# Patient Record
Sex: MALE | Race: WHITE | NOT HISPANIC OR LATINO | Employment: OTHER | URBAN - METROPOLITAN AREA
[De-identification: names, ages, dates, MRNs, and addresses within clinical notes are randomized per-mention and may not be internally consistent; named-entity substitution may affect disease eponyms.]

---

## 2017-01-17 ENCOUNTER — GENERIC CONVERSION - ENCOUNTER (OUTPATIENT)
Dept: OTHER | Facility: OTHER | Age: 82
End: 2017-01-17

## 2017-01-31 ENCOUNTER — HOSPITAL ENCOUNTER (INPATIENT)
Facility: HOSPITAL | Age: 82
LOS: 14 days | Discharge: NURSING, REHAB OR LONG-TERM CARE | DRG: 177 | End: 2017-02-14
Attending: INTERNAL MEDICINE | Admitting: INTERNAL MEDICINE
Payer: MEDICARE

## 2017-01-31 ENCOUNTER — APPOINTMENT (EMERGENCY)
Dept: RADIOLOGY | Facility: HOSPITAL | Age: 82
DRG: 177 | End: 2017-01-31
Payer: MEDICARE

## 2017-01-31 DIAGNOSIS — J18.9 HEALTHCARE-ASSOCIATED PNEUMONIA: Primary | ICD-10-CM

## 2017-01-31 PROBLEM — R09.02 HYPOXIA: Status: ACTIVE | Noted: 2017-01-31

## 2017-01-31 PROBLEM — R53.1 GENERALIZED WEAKNESS: Status: ACTIVE | Noted: 2017-01-31

## 2017-01-31 LAB
ALBUMIN SERPL BCP-MCNC: 3.4 G/DL (ref 3.5–5)
ALP SERPL-CCNC: 87 U/L (ref 46–116)
ALT SERPL W P-5'-P-CCNC: 45 U/L (ref 12–78)
ANION GAP SERPL CALCULATED.3IONS-SCNC: 11 MMOL/L (ref 4–13)
APTT PPP: 28 SECONDS (ref 24–36)
AST SERPL W P-5'-P-CCNC: 68 U/L (ref 5–45)
ATRIAL RATE: 80 BPM
BASOPHILS # BLD AUTO: 0.02 THOUSANDS/ΜL (ref 0–0.1)
BASOPHILS NFR BLD AUTO: 1 % (ref 0–1)
BILIRUB DIRECT SERPL-MCNC: 0.18 MG/DL (ref 0–0.2)
BILIRUB SERPL-MCNC: 0.5 MG/DL (ref 0.2–1)
BUN SERPL-MCNC: 20 MG/DL (ref 5–25)
CALCIUM SERPL-MCNC: 9.2 MG/DL (ref 8.3–10.1)
CHLORIDE SERPL-SCNC: 101 MMOL/L (ref 100–108)
CLARITY, POC: CLEAR
CO2 SERPL-SCNC: 28 MMOL/L (ref 21–32)
COLOR, POC: YELLOW
CREAT SERPL-MCNC: 1.14 MG/DL (ref 0.6–1.3)
EOSINOPHIL # BLD AUTO: 0.04 THOUSAND/ΜL (ref 0–0.61)
EOSINOPHIL NFR BLD AUTO: 1 % (ref 0–6)
ERYTHROCYTE [DISTWIDTH] IN BLOOD BY AUTOMATED COUNT: 12.9 % (ref 11.6–15.1)
EXT BILIRUBIN, UA: NEGATIVE
EXT BLOOD URINE: NEGATIVE
EXT GLUCOSE, UA: NEGATIVE
EXT KETONES: NEGATIVE
EXT NITRITE, UA: NEGATIVE
EXT PH, UA: 5
EXT PROTEIN, UA: NORMAL
EXT SPECIFIC GRAVITY, UA: 1.02
EXT UROBILINOGEN: 0.2
FLUAV AG SPEC QL IA: NEGATIVE
FLUBV AG SPEC QL IA: NEGATIVE
GFR SERPL CREATININE-BSD FRML MDRD: 59.9 ML/MIN/1.73SQ M
GLUCOSE SERPL-MCNC: 148 MG/DL (ref 65–140)
HCT VFR BLD AUTO: 38.2 % (ref 36.5–49.3)
HGB BLD-MCNC: 12.7 G/DL (ref 12–17)
INR PPP: 1.04 (ref 0.86–1.16)
L PNEUMO1 AG UR QL IA.RAPID: NEGATIVE
LACTATE SERPL-SCNC: 1.3 MMOL/L (ref 0.5–2)
LYMPHOCYTES # BLD AUTO: 0.88 THOUSANDS/ΜL (ref 0.6–4.47)
LYMPHOCYTES NFR BLD AUTO: 22 % (ref 14–44)
MAGNESIUM SERPL-MCNC: 2 MG/DL (ref 1.6–2.6)
MCH RBC QN AUTO: 32.2 PG (ref 26.8–34.3)
MCHC RBC AUTO-ENTMCNC: 33.2 G/DL (ref 31.4–37.4)
MCV RBC AUTO: 97 FL (ref 82–98)
MONOCYTES # BLD AUTO: 0.24 THOUSAND/ΜL (ref 0.17–1.22)
MONOCYTES NFR BLD AUTO: 6 % (ref 4–12)
NEUTROPHILS # BLD AUTO: 2.73 THOUSANDS/ΜL (ref 1.85–7.62)
NEUTS SEG NFR BLD AUTO: 70 % (ref 43–75)
NRBC BLD AUTO-RTO: 0 /100 WBCS
P AXIS: 7 DEGREES
PLATELET # BLD AUTO: 130 THOUSANDS/UL (ref 149–390)
PMV BLD AUTO: 9.8 FL (ref 8.9–12.7)
POTASSIUM SERPL-SCNC: 4 MMOL/L (ref 3.5–5.3)
PR INTERVAL: 218 MS
PROT SERPL-MCNC: 6.6 G/DL (ref 6.4–8.2)
PROTHROMBIN TIME: 13.5 SECONDS (ref 12–14.3)
QRS AXIS: 18 DEGREES
QRSD INTERVAL: 76 MS
QT INTERVAL: 354 MS
QTC INTERVAL: 408 MS
RBC # BLD AUTO: 3.95 MILLION/UL (ref 3.88–5.62)
S PNEUM AG UR QL: NEGATIVE
SODIUM SERPL-SCNC: 140 MMOL/L (ref 136–145)
SPECIMEN SOURCE: NORMAL
T WAVE AXIS: 13 DEGREES
TROPONIN I BLD-MCNC: 0.01 NG/ML (ref 0–0.08)
TSH SERPL DL<=0.05 MIU/L-ACNC: 0.89 UIU/ML (ref 0.36–3.74)
VENTRICULAR RATE: 80 BPM
WBC # BLD AUTO: 3.93 THOUSAND/UL (ref 4.31–10.16)
WBC # BLD EST: NEGATIVE 10*3/UL

## 2017-01-31 PROCEDURE — 71010 HB CHEST X-RAY 1 VIEW FRONTAL (PORTABLE): CPT

## 2017-01-31 PROCEDURE — 83735 ASSAY OF MAGNESIUM: CPT | Performed by: PHYSICIAN ASSISTANT

## 2017-01-31 PROCEDURE — 85610 PROTHROMBIN TIME: CPT | Performed by: PHYSICIAN ASSISTANT

## 2017-01-31 PROCEDURE — 85025 COMPLETE CBC W/AUTO DIFF WBC: CPT | Performed by: PHYSICIAN ASSISTANT

## 2017-01-31 PROCEDURE — 36415 COLL VENOUS BLD VENIPUNCTURE: CPT | Performed by: PHYSICIAN ASSISTANT

## 2017-01-31 PROCEDURE — 80076 HEPATIC FUNCTION PANEL: CPT | Performed by: PHYSICIAN ASSISTANT

## 2017-01-31 PROCEDURE — 80048 BASIC METABOLIC PNL TOTAL CA: CPT | Performed by: PHYSICIAN ASSISTANT

## 2017-01-31 PROCEDURE — 84484 ASSAY OF TROPONIN QUANT: CPT

## 2017-01-31 PROCEDURE — 87798 DETECT AGENT NOS DNA AMP: CPT | Performed by: PHYSICIAN ASSISTANT

## 2017-01-31 PROCEDURE — 87449 NOS EACH ORGANISM AG IA: CPT | Performed by: FAMILY MEDICINE

## 2017-01-31 PROCEDURE — 93005 ELECTROCARDIOGRAM TRACING: CPT | Performed by: PHYSICIAN ASSISTANT

## 2017-01-31 PROCEDURE — 83605 ASSAY OF LACTIC ACID: CPT | Performed by: PHYSICIAN ASSISTANT

## 2017-01-31 PROCEDURE — 84443 ASSAY THYROID STIM HORMONE: CPT | Performed by: PHYSICIAN ASSISTANT

## 2017-01-31 PROCEDURE — 87400 INFLUENZA A/B EACH AG IA: CPT | Performed by: PHYSICIAN ASSISTANT

## 2017-01-31 PROCEDURE — 81002 URINALYSIS NONAUTO W/O SCOPE: CPT | Performed by: PHYSICIAN ASSISTANT

## 2017-01-31 PROCEDURE — 85730 THROMBOPLASTIN TIME PARTIAL: CPT | Performed by: PHYSICIAN ASSISTANT

## 2017-01-31 RX ORDER — POTASSIUM CHLORIDE 750 MG/1
10 TABLET, FILM COATED, EXTENDED RELEASE ORAL 2 TIMES DAILY
COMMUNITY
End: 2017-02-14 | Stop reason: HOSPADM

## 2017-01-31 RX ORDER — LOPERAMIDE HYDROCHLORIDE 2 MG/1
2 CAPSULE ORAL AS NEEDED
COMMUNITY
End: 2017-02-14 | Stop reason: HOSPADM

## 2017-01-31 RX ORDER — VANCOMYCIN HYDROCHLORIDE 1 G/200ML
12.5 INJECTION, SOLUTION INTRAVENOUS EVERY 24 HOURS
Status: DISCONTINUED | OUTPATIENT
Start: 2017-02-01 | End: 2017-02-03

## 2017-01-31 RX ORDER — MEMANTINE HYDROCHLORIDE 28 MG/1
1 CAPSULE, EXTENDED RELEASE ORAL DAILY
COMMUNITY

## 2017-01-31 RX ORDER — ASPIRIN 81 MG/1
81 TABLET, CHEWABLE ORAL DAILY
COMMUNITY

## 2017-01-31 RX ORDER — FUROSEMIDE 40 MG/1
40 TABLET ORAL DAILY
Status: DISCONTINUED | OUTPATIENT
Start: 2017-02-01 | End: 2017-02-08

## 2017-01-31 RX ORDER — POTASSIUM CHLORIDE 750 MG/1
10 TABLET, EXTENDED RELEASE ORAL 2 TIMES DAILY
Status: DISCONTINUED | OUTPATIENT
Start: 2017-01-31 | End: 2017-02-09

## 2017-01-31 RX ORDER — GUAIFENESIN 600 MG
600 TABLET, EXTENDED RELEASE 12 HR ORAL 2 TIMES DAILY
Status: DISCONTINUED | OUTPATIENT
Start: 2017-01-31 | End: 2017-02-09

## 2017-01-31 RX ORDER — LOPERAMIDE HYDROCHLORIDE 2 MG/1
2 CAPSULE ORAL AS NEEDED
Status: DISCONTINUED | OUTPATIENT
Start: 2017-01-31 | End: 2017-02-14 | Stop reason: HOSPADM

## 2017-01-31 RX ORDER — CHOLECALCIFEROL (VITAMIN D3) 125 MCG
1000 CAPSULE ORAL DAILY
Status: DISCONTINUED | OUTPATIENT
Start: 2017-02-01 | End: 2017-02-14 | Stop reason: HOSPADM

## 2017-01-31 RX ORDER — FUROSEMIDE 40 MG/1
40 TABLET ORAL DAILY
COMMUNITY
End: 2017-02-14 | Stop reason: HOSPADM

## 2017-01-31 RX ORDER — IPRATROPIUM BROMIDE AND ALBUTEROL SULFATE 2.5; .5 MG/3ML; MG/3ML
3 SOLUTION RESPIRATORY (INHALATION) EVERY 4 HOURS PRN
Status: DISCONTINUED | OUTPATIENT
Start: 2017-01-31 | End: 2017-02-03

## 2017-01-31 RX ORDER — DORZOLAMIDE HCL 20 MG/ML
1 SOLUTION/ DROPS OPHTHALMIC 2 TIMES DAILY
Status: DISCONTINUED | OUTPATIENT
Start: 2017-01-31 | End: 2017-02-14 | Stop reason: HOSPADM

## 2017-01-31 RX ORDER — VANCOMYCIN HYDROCHLORIDE 1 G/200ML
12.5 INJECTION, SOLUTION INTRAVENOUS EVERY 24 HOURS
Status: DISCONTINUED | OUTPATIENT
Start: 2017-01-31 | End: 2017-01-31

## 2017-01-31 RX ORDER — NYSTATIN 100000 [USP'U]/G
1 POWDER TOPICAL 2 TIMES DAILY
COMMUNITY
End: 2017-10-30 | Stop reason: HOSPADM

## 2017-01-31 RX ORDER — DONEPEZIL HYDROCHLORIDE 10 MG/1
10 TABLET, FILM COATED ORAL
COMMUNITY

## 2017-01-31 RX ORDER — LANOLIN ALCOHOL/MO/W.PET/CERES
1000 CREAM (GRAM) TOPICAL DAILY
COMMUNITY
End: 2017-02-14 | Stop reason: HOSPADM

## 2017-01-31 RX ORDER — ACETAMINOPHEN 325 MG/1
1000 TABLET ORAL EVERY 8 HOURS PRN
Status: DISCONTINUED | OUTPATIENT
Start: 2017-01-31 | End: 2017-02-13

## 2017-01-31 RX ORDER — TERBINAFINE HYDROCHLORIDE 250 MG/1
250 TABLET ORAL DAILY
COMMUNITY
End: 2017-02-14 | Stop reason: HOSPADM

## 2017-01-31 RX ORDER — ACETAMINOPHEN 325 MG/1
650 TABLET ORAL ONCE
Status: COMPLETED | OUTPATIENT
Start: 2017-01-31 | End: 2017-01-31

## 2017-01-31 RX ORDER — PANTOPRAZOLE SODIUM 40 MG/1
INJECTION, POWDER, FOR SOLUTION INTRAVENOUS
Status: DISPENSED
Start: 2017-01-31 | End: 2017-02-01

## 2017-01-31 RX ORDER — HEPARIN SODIUM 10000 [USP'U]/100ML
INJECTION, SOLUTION INTRAVENOUS
Status: DISPENSED
Start: 2017-01-31 | End: 2017-02-01

## 2017-01-31 RX ORDER — ASPIRIN 81 MG/1
81 TABLET, CHEWABLE ORAL DAILY
Status: DISCONTINUED | OUTPATIENT
Start: 2017-02-01 | End: 2017-02-14 | Stop reason: HOSPADM

## 2017-01-31 RX ORDER — DONEPEZIL HYDROCHLORIDE 5 MG/1
10 TABLET, FILM COATED ORAL
Status: DISCONTINUED | OUTPATIENT
Start: 2017-01-31 | End: 2017-02-14 | Stop reason: HOSPADM

## 2017-01-31 RX ORDER — NYSTATIN 100000 [USP'U]/G
1 POWDER TOPICAL 2 TIMES DAILY
Status: DISCONTINUED | OUTPATIENT
Start: 2017-01-31 | End: 2017-02-14 | Stop reason: HOSPADM

## 2017-01-31 RX ORDER — ACETAMINOPHEN 500 MG
1000 TABLET ORAL EVERY 8 HOURS PRN
COMMUNITY
End: 2017-02-14 | Stop reason: HOSPADM

## 2017-01-31 RX ORDER — DORZOLAMIDE HCL 20 MG/ML
1 SOLUTION/ DROPS OPHTHALMIC 2 TIMES DAILY
COMMUNITY

## 2017-01-31 RX ADMIN — BIMATOPROST 1 DROP: 0.1 SOLUTION/ DROPS OPHTHALMIC at 21:01

## 2017-01-31 RX ADMIN — CEFEPIME HYDROCHLORIDE 2000 MG: 2 INJECTION, POWDER, FOR SOLUTION INTRAVENOUS at 15:31

## 2017-01-31 RX ADMIN — NYSTATIN 1 APPLICATION: 100000 POWDER TOPICAL at 20:58

## 2017-01-31 RX ADMIN — POTASSIUM CHLORIDE 10 MEQ: 750 TABLET, EXTENDED RELEASE ORAL at 20:30

## 2017-01-31 RX ADMIN — ALBUTEROL SULFATE 5 MG: 2.5 SOLUTION RESPIRATORY (INHALATION) at 14:33

## 2017-01-31 RX ADMIN — VANCOMYCIN HYDROCHLORIDE 1250 MG: 1 INJECTION, POWDER, LYOPHILIZED, FOR SOLUTION INTRAVENOUS at 16:28

## 2017-01-31 RX ADMIN — ACETAMINOPHEN 650 MG: 325 TABLET ORAL at 15:40

## 2017-01-31 RX ADMIN — GUAIFENESIN 600 MG: 600 TABLET, EXTENDED RELEASE ORAL at 20:30

## 2017-01-31 RX ADMIN — DONEPEZIL HYDROCHLORIDE 10 MG: 5 TABLET, FILM COATED ORAL at 21:02

## 2017-01-31 RX ADMIN — SODIUM CHLORIDE 500 ML: 0.9 INJECTION, SOLUTION INTRAVENOUS at 14:30

## 2017-01-31 RX ADMIN — DORZOLAMIDE HYDROCHLORIDE 1 DROP: 20 SOLUTION/ DROPS OPHTHALMIC at 21:02

## 2017-01-31 RX ADMIN — IPRATROPIUM BROMIDE AND ALBUTEROL SULFATE 3 ML: .5; 3 SOLUTION RESPIRATORY (INHALATION) at 21:06

## 2017-02-01 LAB
ANION GAP SERPL CALCULATED.3IONS-SCNC: 8 MMOL/L (ref 4–13)
BUN SERPL-MCNC: 17 MG/DL (ref 5–25)
CALCIUM SERPL-MCNC: 8.8 MG/DL (ref 8.3–10.1)
CHLORIDE SERPL-SCNC: 103 MMOL/L (ref 100–108)
CO2 SERPL-SCNC: 28 MMOL/L (ref 21–32)
CREAT SERPL-MCNC: 1.06 MG/DL (ref 0.6–1.3)
ERYTHROCYTE [DISTWIDTH] IN BLOOD BY AUTOMATED COUNT: 12.8 % (ref 11.6–15.1)
FLUAV AG SPEC QL: NORMAL
FLUBV AG SPEC QL: NORMAL
GFR SERPL CREATININE-BSD FRML MDRD: >60 ML/MIN/1.73SQ M
GLUCOSE SERPL-MCNC: 125 MG/DL (ref 65–140)
HCT VFR BLD AUTO: 35.9 % (ref 36.5–49.3)
HGB BLD-MCNC: 11.6 G/DL (ref 12–17)
MAGNESIUM SERPL-MCNC: 1.9 MG/DL (ref 1.6–2.6)
MCH RBC QN AUTO: 31.4 PG (ref 26.8–34.3)
MCHC RBC AUTO-ENTMCNC: 32.3 G/DL (ref 31.4–37.4)
MCV RBC AUTO: 97 FL (ref 82–98)
PLATELET # BLD AUTO: 113 THOUSANDS/UL (ref 149–390)
PMV BLD AUTO: 9.4 FL (ref 8.9–12.7)
POTASSIUM SERPL-SCNC: 4.2 MMOL/L (ref 3.5–5.3)
RBC # BLD AUTO: 3.69 MILLION/UL (ref 3.88–5.62)
RSV B RNA SPEC QL NAA+PROBE: NORMAL
SODIUM SERPL-SCNC: 139 MMOL/L (ref 136–145)
WBC # BLD AUTO: 3.6 THOUSAND/UL (ref 4.31–10.16)

## 2017-02-01 PROCEDURE — G8978 MOBILITY CURRENT STATUS: HCPCS

## 2017-02-01 PROCEDURE — G8979 MOBILITY GOAL STATUS: HCPCS

## 2017-02-01 PROCEDURE — 85027 COMPLETE CBC AUTOMATED: CPT | Performed by: FAMILY MEDICINE

## 2017-02-01 PROCEDURE — 97163 PT EVAL HIGH COMPLEX 45 MIN: CPT

## 2017-02-01 PROCEDURE — 94760 N-INVAS EAR/PLS OXIMETRY 1: CPT

## 2017-02-01 PROCEDURE — 36415 COLL VENOUS BLD VENIPUNCTURE: CPT | Performed by: FAMILY MEDICINE

## 2017-02-01 PROCEDURE — 80048 BASIC METABOLIC PNL TOTAL CA: CPT | Performed by: FAMILY MEDICINE

## 2017-02-01 PROCEDURE — 83735 ASSAY OF MAGNESIUM: CPT | Performed by: FAMILY MEDICINE

## 2017-02-01 PROCEDURE — 99285 EMERGENCY DEPT VISIT HI MDM: CPT

## 2017-02-01 PROCEDURE — 94640 AIRWAY INHALATION TREATMENT: CPT

## 2017-02-01 RX ADMIN — DONEPEZIL HYDROCHLORIDE 10 MG: 5 TABLET, FILM COATED ORAL at 21:18

## 2017-02-01 RX ADMIN — NYSTATIN 1 APPLICATION: 100000 POWDER TOPICAL at 08:33

## 2017-02-01 RX ADMIN — FUROSEMIDE 40 MG: 40 TABLET ORAL at 08:33

## 2017-02-01 RX ADMIN — CEFEPIME HYDROCHLORIDE 1000 MG: 1 INJECTION, SOLUTION INTRAVENOUS at 03:30

## 2017-02-01 RX ADMIN — IPRATROPIUM BROMIDE AND ALBUTEROL SULFATE 3 ML: .5; 3 SOLUTION RESPIRATORY (INHALATION) at 11:21

## 2017-02-01 RX ADMIN — BIMATOPROST 1 DROP: 0.1 SOLUTION/ DROPS OPHTHALMIC at 21:18

## 2017-02-01 RX ADMIN — NYSTATIN 1 APPLICATION: 100000 POWDER TOPICAL at 18:24

## 2017-02-01 RX ADMIN — GUAIFENESIN 600 MG: 600 TABLET, EXTENDED RELEASE ORAL at 18:23

## 2017-02-01 RX ADMIN — CYANOCOBALAMIN TAB 500 MCG 1000 MCG: 500 TAB at 09:55

## 2017-02-01 RX ADMIN — POTASSIUM CHLORIDE 10 MEQ: 750 TABLET, EXTENDED RELEASE ORAL at 18:23

## 2017-02-01 RX ADMIN — POTASSIUM CHLORIDE 10 MEQ: 750 TABLET, EXTENDED RELEASE ORAL at 08:34

## 2017-02-01 RX ADMIN — DORZOLAMIDE HYDROCHLORIDE 1 DROP: 20 SOLUTION/ DROPS OPHTHALMIC at 18:24

## 2017-02-01 RX ADMIN — IPRATROPIUM BROMIDE AND ALBUTEROL SULFATE 3 ML: .5; 3 SOLUTION RESPIRATORY (INHALATION) at 05:56

## 2017-02-01 RX ADMIN — GUAIFENESIN 600 MG: 600 TABLET, EXTENDED RELEASE ORAL at 08:34

## 2017-02-01 RX ADMIN — ENOXAPARIN SODIUM 30 MG: 40 INJECTION SUBCUTANEOUS at 08:33

## 2017-02-01 RX ADMIN — ASPIRIN 81 MG CHEWABLE TABLET 81 MG: 81 TABLET CHEWABLE at 08:33

## 2017-02-01 RX ADMIN — IPRATROPIUM BROMIDE AND ALBUTEROL SULFATE 3 ML: .5; 3 SOLUTION RESPIRATORY (INHALATION) at 20:01

## 2017-02-01 RX ADMIN — DORZOLAMIDE HYDROCHLORIDE 1 DROP: 20 SOLUTION/ DROPS OPHTHALMIC at 09:34

## 2017-02-01 RX ADMIN — VANCOMYCIN HYDROCHLORIDE 1000 MG: 1 INJECTION, SOLUTION INTRAVENOUS at 16:00

## 2017-02-01 RX ADMIN — Medication 1000 MG: at 16:17

## 2017-02-02 LAB
BASOPHILS # BLD AUTO: 0.01 THOUSANDS/ΜL (ref 0–0.1)
BASOPHILS NFR BLD AUTO: 0 % (ref 0–1)
EOSINOPHIL # BLD AUTO: 0 THOUSAND/ΜL (ref 0–0.61)
EOSINOPHIL NFR BLD AUTO: 0 % (ref 0–6)
ERYTHROCYTE [DISTWIDTH] IN BLOOD BY AUTOMATED COUNT: 12.5 % (ref 11.6–15.1)
HCT VFR BLD AUTO: 38.5 % (ref 36.5–49.3)
HGB BLD-MCNC: 13 G/DL (ref 12–17)
LYMPHOCYTES # BLD AUTO: 0.88 THOUSANDS/ΜL (ref 0.6–4.47)
LYMPHOCYTES NFR BLD AUTO: 24 % (ref 14–44)
MCH RBC QN AUTO: 31.9 PG (ref 26.8–34.3)
MCHC RBC AUTO-ENTMCNC: 33.8 G/DL (ref 31.4–37.4)
MCV RBC AUTO: 95 FL (ref 82–98)
MONOCYTES # BLD AUTO: 0.11 THOUSAND/ΜL (ref 0.17–1.22)
MONOCYTES NFR BLD AUTO: 3 % (ref 4–12)
NEUTROPHILS # BLD AUTO: 2.72 THOUSANDS/ΜL (ref 1.85–7.62)
NEUTS SEG NFR BLD AUTO: 73 % (ref 43–75)
NRBC BLD AUTO-RTO: 0 /100 WBCS
PLATELET # BLD AUTO: 111 THOUSANDS/UL (ref 149–390)
PMV BLD AUTO: 9.6 FL (ref 8.9–12.7)
RBC # BLD AUTO: 4.07 MILLION/UL (ref 3.88–5.62)
WBC # BLD AUTO: 3.74 THOUSAND/UL (ref 4.31–10.16)

## 2017-02-02 PROCEDURE — 87040 BLOOD CULTURE FOR BACTERIA: CPT | Performed by: FAMILY MEDICINE

## 2017-02-02 PROCEDURE — 85025 COMPLETE CBC W/AUTO DIFF WBC: CPT | Performed by: FAMILY MEDICINE

## 2017-02-02 PROCEDURE — 94640 AIRWAY INHALATION TREATMENT: CPT

## 2017-02-02 PROCEDURE — 94760 N-INVAS EAR/PLS OXIMETRY 1: CPT

## 2017-02-02 RX ADMIN — ASPIRIN 81 MG CHEWABLE TABLET 81 MG: 81 TABLET CHEWABLE at 09:21

## 2017-02-02 RX ADMIN — GUAIFENESIN 600 MG: 600 TABLET, EXTENDED RELEASE ORAL at 17:58

## 2017-02-02 RX ADMIN — ENOXAPARIN SODIUM 30 MG: 40 INJECTION SUBCUTANEOUS at 09:21

## 2017-02-02 RX ADMIN — DONEPEZIL HYDROCHLORIDE 10 MG: 5 TABLET, FILM COATED ORAL at 22:09

## 2017-02-02 RX ADMIN — IPRATROPIUM BROMIDE AND ALBUTEROL SULFATE 3 ML: .5; 3 SOLUTION RESPIRATORY (INHALATION) at 11:02

## 2017-02-02 RX ADMIN — NYSTATIN 1 APPLICATION: 100000 POWDER TOPICAL at 09:23

## 2017-02-02 RX ADMIN — DORZOLAMIDE HYDROCHLORIDE 1 DROP: 20 SOLUTION/ DROPS OPHTHALMIC at 17:14

## 2017-02-02 RX ADMIN — VANCOMYCIN HYDROCHLORIDE 1000 MG: 1 INJECTION, SOLUTION INTRAVENOUS at 16:09

## 2017-02-02 RX ADMIN — NYSTATIN 1 APPLICATION: 100000 POWDER TOPICAL at 17:14

## 2017-02-02 RX ADMIN — POTASSIUM CHLORIDE 10 MEQ: 750 TABLET, EXTENDED RELEASE ORAL at 09:21

## 2017-02-02 RX ADMIN — POTASSIUM CHLORIDE 10 MEQ: 750 TABLET, EXTENDED RELEASE ORAL at 17:58

## 2017-02-02 RX ADMIN — CYANOCOBALAMIN TAB 500 MCG 1000 MCG: 500 TAB at 09:21

## 2017-02-02 RX ADMIN — FUROSEMIDE 40 MG: 40 TABLET ORAL at 09:21

## 2017-02-02 RX ADMIN — DORZOLAMIDE HYDROCHLORIDE 1 DROP: 20 SOLUTION/ DROPS OPHTHALMIC at 09:23

## 2017-02-02 RX ADMIN — Medication 1000 MG: at 15:34

## 2017-02-02 RX ADMIN — Medication 1000 MG: at 02:54

## 2017-02-02 RX ADMIN — BIMATOPROST 1 DROP: 0.1 SOLUTION/ DROPS OPHTHALMIC at 22:09

## 2017-02-02 RX ADMIN — GUAIFENESIN 600 MG: 600 TABLET, EXTENDED RELEASE ORAL at 09:21

## 2017-02-03 LAB — VANCOMYCIN TROUGH SERPL-MCNC: 6.7 UG/ML (ref 10–20)

## 2017-02-03 PROCEDURE — 94760 N-INVAS EAR/PLS OXIMETRY 1: CPT

## 2017-02-03 PROCEDURE — 94668 MNPJ CHEST WALL SBSQ: CPT

## 2017-02-03 PROCEDURE — 80202 ASSAY OF VANCOMYCIN: CPT | Performed by: FAMILY MEDICINE

## 2017-02-03 PROCEDURE — 94640 AIRWAY INHALATION TREATMENT: CPT

## 2017-02-03 PROCEDURE — 92610 EVALUATE SWALLOWING FUNCTION: CPT

## 2017-02-03 PROCEDURE — 94669 MECHANICAL CHEST WALL OSCILL: CPT

## 2017-02-03 RX ORDER — IPRATROPIUM BROMIDE AND ALBUTEROL SULFATE 2.5; .5 MG/3ML; MG/3ML
3 SOLUTION RESPIRATORY (INHALATION)
Status: DISCONTINUED | OUTPATIENT
Start: 2017-02-03 | End: 2017-02-06

## 2017-02-03 RX ADMIN — FUROSEMIDE 40 MG: 40 TABLET ORAL at 08:40

## 2017-02-03 RX ADMIN — ASPIRIN 81 MG CHEWABLE TABLET 81 MG: 81 TABLET CHEWABLE at 08:53

## 2017-02-03 RX ADMIN — GUAIFENESIN 600 MG: 600 TABLET, EXTENDED RELEASE ORAL at 18:16

## 2017-02-03 RX ADMIN — DORZOLAMIDE HYDROCHLORIDE 1 DROP: 20 SOLUTION/ DROPS OPHTHALMIC at 18:17

## 2017-02-03 RX ADMIN — DORZOLAMIDE HYDROCHLORIDE 1 DROP: 20 SOLUTION/ DROPS OPHTHALMIC at 08:38

## 2017-02-03 RX ADMIN — POTASSIUM CHLORIDE 10 MEQ: 750 TABLET, EXTENDED RELEASE ORAL at 08:39

## 2017-02-03 RX ADMIN — NYSTATIN 1 APPLICATION: 100000 POWDER TOPICAL at 18:16

## 2017-02-03 RX ADMIN — IPRATROPIUM BROMIDE AND ALBUTEROL SULFATE 3 ML: .5; 3 SOLUTION RESPIRATORY (INHALATION) at 21:42

## 2017-02-03 RX ADMIN — ENOXAPARIN SODIUM 30 MG: 40 INJECTION SUBCUTANEOUS at 08:40

## 2017-02-03 RX ADMIN — IPRATROPIUM BROMIDE AND ALBUTEROL SULFATE 3 ML: .5; 3 SOLUTION RESPIRATORY (INHALATION) at 17:03

## 2017-02-03 RX ADMIN — NYSTATIN 1 APPLICATION: 100000 POWDER TOPICAL at 08:38

## 2017-02-03 RX ADMIN — BIMATOPROST 1 DROP: 0.1 SOLUTION/ DROPS OPHTHALMIC at 22:26

## 2017-02-03 RX ADMIN — CYANOCOBALAMIN TAB 500 MCG 1000 MCG: 500 TAB at 08:39

## 2017-02-03 RX ADMIN — GUAIFENESIN 600 MG: 600 TABLET, EXTENDED RELEASE ORAL at 08:40

## 2017-02-03 RX ADMIN — POTASSIUM CHLORIDE 10 MEQ: 750 TABLET, EXTENDED RELEASE ORAL at 18:16

## 2017-02-03 RX ADMIN — Medication 1000 MG: at 16:07

## 2017-02-03 RX ADMIN — IPRATROPIUM BROMIDE AND ALBUTEROL SULFATE 3 ML: .5; 3 SOLUTION RESPIRATORY (INHALATION) at 10:07

## 2017-02-03 RX ADMIN — DONEPEZIL HYDROCHLORIDE 10 MG: 5 TABLET, FILM COATED ORAL at 22:25

## 2017-02-03 RX ADMIN — VANCOMYCIN HYDROCHLORIDE 750 MG: 750 INJECTION, SOLUTION INTRAVENOUS at 19:09

## 2017-02-03 RX ADMIN — Medication 1000 MG: at 05:40

## 2017-02-04 LAB — VANCOMYCIN TROUGH SERPL-MCNC: 21.4 UG/ML (ref 10–20)

## 2017-02-04 PROCEDURE — 94669 MECHANICAL CHEST WALL OSCILL: CPT

## 2017-02-04 PROCEDURE — 94760 N-INVAS EAR/PLS OXIMETRY 1: CPT

## 2017-02-04 PROCEDURE — 80202 ASSAY OF VANCOMYCIN: CPT | Performed by: FAMILY MEDICINE

## 2017-02-04 PROCEDURE — 94640 AIRWAY INHALATION TREATMENT: CPT

## 2017-02-04 RX ADMIN — IPRATROPIUM BROMIDE AND ALBUTEROL SULFATE 3 ML: .5; 3 SOLUTION RESPIRATORY (INHALATION) at 15:42

## 2017-02-04 RX ADMIN — CYANOCOBALAMIN TAB 500 MCG 1000 MCG: 500 TAB at 08:46

## 2017-02-04 RX ADMIN — DORZOLAMIDE HYDROCHLORIDE 1 DROP: 20 SOLUTION/ DROPS OPHTHALMIC at 08:49

## 2017-02-04 RX ADMIN — POTASSIUM CHLORIDE 10 MEQ: 750 TABLET, EXTENDED RELEASE ORAL at 17:54

## 2017-02-04 RX ADMIN — ENOXAPARIN SODIUM 30 MG: 40 INJECTION SUBCUTANEOUS at 08:47

## 2017-02-04 RX ADMIN — BIMATOPROST 1 DROP: 0.1 SOLUTION/ DROPS OPHTHALMIC at 21:24

## 2017-02-04 RX ADMIN — DONEPEZIL HYDROCHLORIDE 10 MG: 5 TABLET, FILM COATED ORAL at 21:24

## 2017-02-04 RX ADMIN — ASPIRIN 81 MG CHEWABLE TABLET 81 MG: 81 TABLET CHEWABLE at 08:47

## 2017-02-04 RX ADMIN — GUAIFENESIN 600 MG: 600 TABLET, EXTENDED RELEASE ORAL at 17:54

## 2017-02-04 RX ADMIN — Medication 1000 MG: at 17:56

## 2017-02-04 RX ADMIN — IPRATROPIUM BROMIDE AND ALBUTEROL SULFATE 3 ML: .5; 3 SOLUTION RESPIRATORY (INHALATION) at 09:47

## 2017-02-04 RX ADMIN — VANCOMYCIN HYDROCHLORIDE 750 MG: 750 INJECTION, SOLUTION INTRAVENOUS at 21:24

## 2017-02-04 RX ADMIN — NYSTATIN 1 APPLICATION: 100000 POWDER TOPICAL at 08:49

## 2017-02-04 RX ADMIN — NYSTATIN 1 APPLICATION: 100000 POWDER TOPICAL at 17:58

## 2017-02-04 RX ADMIN — FUROSEMIDE 40 MG: 40 TABLET ORAL at 08:47

## 2017-02-04 RX ADMIN — VANCOMYCIN HYDROCHLORIDE 750 MG: 750 INJECTION, SOLUTION INTRAVENOUS at 12:31

## 2017-02-04 RX ADMIN — POTASSIUM CHLORIDE 10 MEQ: 750 TABLET, EXTENDED RELEASE ORAL at 08:48

## 2017-02-04 RX ADMIN — Medication 1000 MG: at 03:48

## 2017-02-04 RX ADMIN — VANCOMYCIN HYDROCHLORIDE 750 MG: 750 INJECTION, SOLUTION INTRAVENOUS at 04:26

## 2017-02-04 RX ADMIN — GUAIFENESIN 600 MG: 600 TABLET, EXTENDED RELEASE ORAL at 08:48

## 2017-02-04 RX ADMIN — DORZOLAMIDE HYDROCHLORIDE 1 DROP: 20 SOLUTION/ DROPS OPHTHALMIC at 17:58

## 2017-02-04 RX ADMIN — IPRATROPIUM BROMIDE AND ALBUTEROL SULFATE 3 ML: .5; 3 SOLUTION RESPIRATORY (INHALATION) at 21:48

## 2017-02-05 PROCEDURE — 94669 MECHANICAL CHEST WALL OSCILL: CPT

## 2017-02-05 PROCEDURE — 94640 AIRWAY INHALATION TREATMENT: CPT

## 2017-02-05 PROCEDURE — 94760 N-INVAS EAR/PLS OXIMETRY 1: CPT

## 2017-02-05 RX ADMIN — VANCOMYCIN HYDROCHLORIDE 750 MG: 750 INJECTION, SOLUTION INTRAVENOUS at 21:12

## 2017-02-05 RX ADMIN — POTASSIUM CHLORIDE 10 MEQ: 750 TABLET, EXTENDED RELEASE ORAL at 08:46

## 2017-02-05 RX ADMIN — CYANOCOBALAMIN TAB 500 MCG 1000 MCG: 500 TAB at 08:46

## 2017-02-05 RX ADMIN — DORZOLAMIDE HYDROCHLORIDE 1 DROP: 20 SOLUTION/ DROPS OPHTHALMIC at 08:45

## 2017-02-05 RX ADMIN — IPRATROPIUM BROMIDE AND ALBUTEROL SULFATE 3 ML: .5; 3 SOLUTION RESPIRATORY (INHALATION) at 09:55

## 2017-02-05 RX ADMIN — DONEPEZIL HYDROCHLORIDE 10 MG: 5 TABLET, FILM COATED ORAL at 21:11

## 2017-02-05 RX ADMIN — Medication 1000 MG: at 16:07

## 2017-02-05 RX ADMIN — VANCOMYCIN HYDROCHLORIDE 750 MG: 750 INJECTION, SOLUTION INTRAVENOUS at 08:47

## 2017-02-05 RX ADMIN — IPRATROPIUM BROMIDE AND ALBUTEROL SULFATE 3 ML: .5; 3 SOLUTION RESPIRATORY (INHALATION) at 15:27

## 2017-02-05 RX ADMIN — FUROSEMIDE 40 MG: 40 TABLET ORAL at 08:46

## 2017-02-05 RX ADMIN — IPRATROPIUM BROMIDE AND ALBUTEROL SULFATE 3 ML: .5; 3 SOLUTION RESPIRATORY (INHALATION) at 20:42

## 2017-02-05 RX ADMIN — NYSTATIN 1 APPLICATION: 100000 POWDER TOPICAL at 17:07

## 2017-02-05 RX ADMIN — POTASSIUM CHLORIDE 10 MEQ: 750 TABLET, EXTENDED RELEASE ORAL at 17:07

## 2017-02-05 RX ADMIN — GUAIFENESIN 600 MG: 600 TABLET, EXTENDED RELEASE ORAL at 08:46

## 2017-02-05 RX ADMIN — GUAIFENESIN 600 MG: 600 TABLET, EXTENDED RELEASE ORAL at 17:07

## 2017-02-05 RX ADMIN — ASPIRIN 81 MG CHEWABLE TABLET 81 MG: 81 TABLET CHEWABLE at 08:46

## 2017-02-05 RX ADMIN — ENOXAPARIN SODIUM 30 MG: 40 INJECTION SUBCUTANEOUS at 08:45

## 2017-02-05 RX ADMIN — NYSTATIN 1 APPLICATION: 100000 POWDER TOPICAL at 08:45

## 2017-02-05 RX ADMIN — DORZOLAMIDE HYDROCHLORIDE 1 DROP: 20 SOLUTION/ DROPS OPHTHALMIC at 17:07

## 2017-02-05 RX ADMIN — BIMATOPROST 1 DROP: 0.1 SOLUTION/ DROPS OPHTHALMIC at 21:12

## 2017-02-05 RX ADMIN — Medication 1000 MG: at 03:45

## 2017-02-06 ENCOUNTER — APPOINTMENT (INPATIENT)
Dept: CT IMAGING | Facility: HOSPITAL | Age: 82
DRG: 177 | End: 2017-02-06
Payer: MEDICARE

## 2017-02-06 ENCOUNTER — APPOINTMENT (INPATIENT)
Dept: RADIOLOGY | Facility: HOSPITAL | Age: 82
DRG: 177 | End: 2017-02-06
Payer: MEDICARE

## 2017-02-06 LAB — VANCOMYCIN TROUGH SERPL-MCNC: 18 UG/ML (ref 10–20)

## 2017-02-06 PROCEDURE — 94669 MECHANICAL CHEST WALL OSCILL: CPT

## 2017-02-06 PROCEDURE — 87040 BLOOD CULTURE FOR BACTERIA: CPT | Performed by: FAMILY MEDICINE

## 2017-02-06 PROCEDURE — 97530 THERAPEUTIC ACTIVITIES: CPT

## 2017-02-06 PROCEDURE — 94760 N-INVAS EAR/PLS OXIMETRY 1: CPT

## 2017-02-06 PROCEDURE — 80202 ASSAY OF VANCOMYCIN: CPT | Performed by: FAMILY MEDICINE

## 2017-02-06 PROCEDURE — 92526 ORAL FUNCTION THERAPY: CPT

## 2017-02-06 PROCEDURE — 97166 OT EVAL MOD COMPLEX 45 MIN: CPT

## 2017-02-06 PROCEDURE — G8988 SELF CARE GOAL STATUS: HCPCS

## 2017-02-06 PROCEDURE — G8987 SELF CARE CURRENT STATUS: HCPCS

## 2017-02-06 PROCEDURE — 71020 HB CHEST X-RAY 2VW FRONTAL&LATL: CPT

## 2017-02-06 PROCEDURE — 94640 AIRWAY INHALATION TREATMENT: CPT

## 2017-02-06 PROCEDURE — 70450 CT HEAD/BRAIN W/O DYE: CPT

## 2017-02-06 RX ORDER — LEVALBUTEROL 1.25 MG/.5ML
1.25 SOLUTION, CONCENTRATE RESPIRATORY (INHALATION)
Status: DISCONTINUED | OUTPATIENT
Start: 2017-02-06 | End: 2017-02-14 | Stop reason: HOSPADM

## 2017-02-06 RX ADMIN — BIMATOPROST 1 DROP: 0.1 SOLUTION/ DROPS OPHTHALMIC at 22:37

## 2017-02-06 RX ADMIN — CYANOCOBALAMIN TAB 500 MCG 1000 MCG: 500 TAB at 09:41

## 2017-02-06 RX ADMIN — GUAIFENESIN 600 MG: 600 TABLET, EXTENDED RELEASE ORAL at 18:44

## 2017-02-06 RX ADMIN — ASPIRIN 81 MG CHEWABLE TABLET 81 MG: 81 TABLET CHEWABLE at 09:42

## 2017-02-06 RX ADMIN — FUROSEMIDE 40 MG: 40 TABLET ORAL at 09:41

## 2017-02-06 RX ADMIN — IPRATROPIUM BROMIDE 0.5 MG: 0.5 SOLUTION RESPIRATORY (INHALATION) at 14:53

## 2017-02-06 RX ADMIN — VANCOMYCIN HYDROCHLORIDE 750 MG: 750 INJECTION, SOLUTION INTRAVENOUS at 22:00

## 2017-02-06 RX ADMIN — POTASSIUM CHLORIDE 10 MEQ: 750 TABLET, EXTENDED RELEASE ORAL at 09:41

## 2017-02-06 RX ADMIN — NYSTATIN 1 APPLICATION: 100000 POWDER TOPICAL at 18:44

## 2017-02-06 RX ADMIN — GUAIFENESIN 600 MG: 600 TABLET, EXTENDED RELEASE ORAL at 09:41

## 2017-02-06 RX ADMIN — IPRATROPIUM BROMIDE 0.5 MG: 0.5 SOLUTION RESPIRATORY (INHALATION) at 20:51

## 2017-02-06 RX ADMIN — Medication 1000 MG: at 04:20

## 2017-02-06 RX ADMIN — NYSTATIN 1 APPLICATION: 100000 POWDER TOPICAL at 08:46

## 2017-02-06 RX ADMIN — POTASSIUM CHLORIDE 10 MEQ: 750 TABLET, EXTENDED RELEASE ORAL at 18:44

## 2017-02-06 RX ADMIN — DONEPEZIL HYDROCHLORIDE 10 MG: 5 TABLET, FILM COATED ORAL at 22:37

## 2017-02-06 RX ADMIN — ENOXAPARIN SODIUM 30 MG: 40 INJECTION SUBCUTANEOUS at 09:41

## 2017-02-06 RX ADMIN — IPRATROPIUM BROMIDE AND ALBUTEROL SULFATE 3 ML: .5; 3 SOLUTION RESPIRATORY (INHALATION) at 08:01

## 2017-02-06 RX ADMIN — LEVALBUTEROL HYDROCHLORIDE 1.25 MG: 1.25 SOLUTION, CONCENTRATE RESPIRATORY (INHALATION) at 14:53

## 2017-02-06 RX ADMIN — DORZOLAMIDE HYDROCHLORIDE 1 DROP: 20 SOLUTION/ DROPS OPHTHALMIC at 15:38

## 2017-02-06 RX ADMIN — VANCOMYCIN HYDROCHLORIDE 750 MG: 750 INJECTION, SOLUTION INTRAVENOUS at 08:46

## 2017-02-06 RX ADMIN — LEVALBUTEROL HYDROCHLORIDE 1.25 MG: 1.25 SOLUTION, CONCENTRATE RESPIRATORY (INHALATION) at 20:51

## 2017-02-07 ENCOUNTER — APPOINTMENT (INPATIENT)
Dept: RADIOLOGY | Facility: HOSPITAL | Age: 82
DRG: 177 | End: 2017-02-07
Payer: MEDICARE

## 2017-02-07 LAB
ANION GAP SERPL CALCULATED.3IONS-SCNC: 7 MMOL/L (ref 4–13)
BACTERIA BLD CULT: NORMAL
BACTERIA BLD CULT: NORMAL
BASOPHILS # BLD AUTO: 0.01 THOUSANDS/ΜL (ref 0–0.1)
BASOPHILS NFR BLD AUTO: 0 % (ref 0–1)
BUN SERPL-MCNC: 18 MG/DL (ref 5–25)
CALCIUM SERPL-MCNC: 9.8 MG/DL (ref 8.3–10.1)
CHLORIDE SERPL-SCNC: 104 MMOL/L (ref 100–108)
CO2 SERPL-SCNC: 33 MMOL/L (ref 21–32)
CREAT SERPL-MCNC: 0.96 MG/DL (ref 0.6–1.3)
EOSINOPHIL # BLD AUTO: 0.26 THOUSAND/ΜL (ref 0–0.61)
EOSINOPHIL NFR BLD AUTO: 6 % (ref 0–6)
ERYTHROCYTE [DISTWIDTH] IN BLOOD BY AUTOMATED COUNT: 12.6 % (ref 11.6–15.1)
GFR SERPL CREATININE-BSD FRML MDRD: >60 ML/MIN/1.73SQ M
GLUCOSE SERPL-MCNC: 118 MG/DL (ref 65–140)
HCT VFR BLD AUTO: 34.4 % (ref 36.5–49.3)
HGB BLD-MCNC: 11.4 G/DL (ref 12–17)
LYMPHOCYTES # BLD AUTO: 1.3 THOUSANDS/ΜL (ref 0.6–4.47)
LYMPHOCYTES NFR BLD AUTO: 30 % (ref 14–44)
MCH RBC QN AUTO: 31.1 PG (ref 26.8–34.3)
MCHC RBC AUTO-ENTMCNC: 33.1 G/DL (ref 31.4–37.4)
MCV RBC AUTO: 94 FL (ref 82–98)
MONOCYTES # BLD AUTO: 0.24 THOUSAND/ΜL (ref 0.17–1.22)
MONOCYTES NFR BLD AUTO: 6 % (ref 4–12)
NEUTROPHILS # BLD AUTO: 2.54 THOUSANDS/ΜL (ref 1.85–7.62)
NEUTS SEG NFR BLD AUTO: 58 % (ref 43–75)
NRBC BLD AUTO-RTO: 0 /100 WBCS
PLATELET # BLD AUTO: 210 THOUSANDS/UL (ref 149–390)
PMV BLD AUTO: 9.2 FL (ref 8.9–12.7)
POTASSIUM SERPL-SCNC: 3.6 MMOL/L (ref 3.5–5.3)
RBC # BLD AUTO: 3.67 MILLION/UL (ref 3.88–5.62)
SODIUM SERPL-SCNC: 144 MMOL/L (ref 136–145)
WBC # BLD AUTO: 4.38 THOUSAND/UL (ref 4.31–10.16)

## 2017-02-07 PROCEDURE — 94760 N-INVAS EAR/PLS OXIMETRY 1: CPT

## 2017-02-07 PROCEDURE — 94640 AIRWAY INHALATION TREATMENT: CPT

## 2017-02-07 PROCEDURE — 94669 MECHANICAL CHEST WALL OSCILL: CPT

## 2017-02-07 PROCEDURE — 80048 BASIC METABOLIC PNL TOTAL CA: CPT | Performed by: INTERNAL MEDICINE

## 2017-02-07 PROCEDURE — 85025 COMPLETE CBC W/AUTO DIFF WBC: CPT | Performed by: FAMILY MEDICINE

## 2017-02-07 RX ORDER — DIAPER,BRIEF,INFANT-TODD,DISP
EACH MISCELLANEOUS 4 TIMES DAILY PRN
Status: DISCONTINUED | OUTPATIENT
Start: 2017-02-07 | End: 2017-02-14 | Stop reason: HOSPADM

## 2017-02-07 RX ADMIN — HYDROCORTISONE: 10 CREAM TOPICAL at 13:04

## 2017-02-07 RX ADMIN — CYANOCOBALAMIN TAB 500 MCG 1000 MCG: 500 TAB at 10:42

## 2017-02-07 RX ADMIN — ENOXAPARIN SODIUM 30 MG: 40 INJECTION SUBCUTANEOUS at 10:46

## 2017-02-07 RX ADMIN — PIPERACILLIN SODIUM,TAZOBACTAM SODIUM 4.5 G: 4; .5 INJECTION, POWDER, FOR SOLUTION INTRAVENOUS at 12:14

## 2017-02-07 RX ADMIN — PIPERACILLIN SODIUM,TAZOBACTAM SODIUM 4.5 G: 4; .5 INJECTION, POWDER, FOR SOLUTION INTRAVENOUS at 17:59

## 2017-02-07 RX ADMIN — IPRATROPIUM BROMIDE 0.5 MG: 0.5 SOLUTION RESPIRATORY (INHALATION) at 15:17

## 2017-02-07 RX ADMIN — GUAIFENESIN 600 MG: 600 TABLET, EXTENDED RELEASE ORAL at 17:59

## 2017-02-07 RX ADMIN — LEVALBUTEROL HYDROCHLORIDE 1.25 MG: 1.25 SOLUTION, CONCENTRATE RESPIRATORY (INHALATION) at 09:47

## 2017-02-07 RX ADMIN — IPRATROPIUM BROMIDE 0.5 MG: 0.5 SOLUTION RESPIRATORY (INHALATION) at 20:57

## 2017-02-07 RX ADMIN — LEVALBUTEROL HYDROCHLORIDE 1.25 MG: 1.25 SOLUTION, CONCENTRATE RESPIRATORY (INHALATION) at 20:57

## 2017-02-07 RX ADMIN — POTASSIUM CHLORIDE 10 MEQ: 750 TABLET, EXTENDED RELEASE ORAL at 17:59

## 2017-02-07 RX ADMIN — LEVALBUTEROL HYDROCHLORIDE 1.25 MG: 1.25 SOLUTION, CONCENTRATE RESPIRATORY (INHALATION) at 15:17

## 2017-02-07 RX ADMIN — VANCOMYCIN HYDROCHLORIDE 750 MG: 750 INJECTION, SOLUTION INTRAVENOUS at 20:10

## 2017-02-07 RX ADMIN — GUAIFENESIN 600 MG: 600 TABLET, EXTENDED RELEASE ORAL at 10:42

## 2017-02-07 RX ADMIN — DONEPEZIL HYDROCHLORIDE 10 MG: 5 TABLET, FILM COATED ORAL at 21:14

## 2017-02-07 RX ADMIN — DORZOLAMIDE HYDROCHLORIDE 1 DROP: 20 SOLUTION/ DROPS OPHTHALMIC at 17:56

## 2017-02-07 RX ADMIN — VANCOMYCIN HYDROCHLORIDE 750 MG: 750 INJECTION, SOLUTION INTRAVENOUS at 09:39

## 2017-02-07 RX ADMIN — NYSTATIN 1 APPLICATION: 100000 POWDER TOPICAL at 11:59

## 2017-02-07 RX ADMIN — BIMATOPROST 1 DROP: 0.1 SOLUTION/ DROPS OPHTHALMIC at 21:14

## 2017-02-07 RX ADMIN — POTASSIUM CHLORIDE 10 MEQ: 750 TABLET, EXTENDED RELEASE ORAL at 10:42

## 2017-02-07 RX ADMIN — ASPIRIN 81 MG CHEWABLE TABLET 81 MG: 81 TABLET CHEWABLE at 10:42

## 2017-02-07 RX ADMIN — IPRATROPIUM BROMIDE 0.5 MG: 0.5 SOLUTION RESPIRATORY (INHALATION) at 09:47

## 2017-02-07 RX ADMIN — DORZOLAMIDE HYDROCHLORIDE 1 DROP: 20 SOLUTION/ DROPS OPHTHALMIC at 10:46

## 2017-02-07 RX ADMIN — FUROSEMIDE 40 MG: 40 TABLET ORAL at 10:42

## 2017-02-07 RX ADMIN — Medication 1000 MG: at 02:40

## 2017-02-08 ENCOUNTER — APPOINTMENT (INPATIENT)
Dept: RADIOLOGY | Facility: HOSPITAL | Age: 82
DRG: 177 | End: 2017-02-08
Payer: MEDICARE

## 2017-02-08 LAB
ANION GAP SERPL CALCULATED.3IONS-SCNC: 7 MMOL/L (ref 4–13)
BUN SERPL-MCNC: 17 MG/DL (ref 5–25)
CALCIUM SERPL-MCNC: 9.1 MG/DL (ref 8.3–10.1)
CHLORIDE SERPL-SCNC: 105 MMOL/L (ref 100–108)
CO2 SERPL-SCNC: 32 MMOL/L (ref 21–32)
CREAT SERPL-MCNC: 1.03 MG/DL (ref 0.6–1.3)
ERYTHROCYTE [DISTWIDTH] IN BLOOD BY AUTOMATED COUNT: 12.5 % (ref 11.6–15.1)
GFR SERPL CREATININE-BSD FRML MDRD: >60 ML/MIN/1.73SQ M
GLUCOSE SERPL-MCNC: 113 MG/DL (ref 65–140)
HCT VFR BLD AUTO: 35.9 % (ref 36.5–49.3)
HGB BLD-MCNC: 11.9 G/DL (ref 12–17)
MCH RBC QN AUTO: 31.3 PG (ref 26.8–34.3)
MCHC RBC AUTO-ENTMCNC: 33.1 G/DL (ref 31.4–37.4)
MCV RBC AUTO: 95 FL (ref 82–98)
PLATELET # BLD AUTO: 246 THOUSANDS/UL (ref 149–390)
PMV BLD AUTO: 9.3 FL (ref 8.9–12.7)
POTASSIUM SERPL-SCNC: 3.6 MMOL/L (ref 3.5–5.3)
RBC # BLD AUTO: 3.8 MILLION/UL (ref 3.88–5.62)
SODIUM SERPL-SCNC: 144 MMOL/L (ref 136–145)
WBC # BLD AUTO: 5.11 THOUSAND/UL (ref 4.31–10.16)

## 2017-02-08 PROCEDURE — 85027 COMPLETE CBC AUTOMATED: CPT | Performed by: INTERNAL MEDICINE

## 2017-02-08 PROCEDURE — 74230 X-RAY XM SWLNG FUNCJ C+: CPT

## 2017-02-08 PROCEDURE — 92611 MOTION FLUOROSCOPY/SWALLOW: CPT

## 2017-02-08 PROCEDURE — 94669 MECHANICAL CHEST WALL OSCILL: CPT

## 2017-02-08 PROCEDURE — 80048 BASIC METABOLIC PNL TOTAL CA: CPT | Performed by: INTERNAL MEDICINE

## 2017-02-08 PROCEDURE — 94640 AIRWAY INHALATION TREATMENT: CPT

## 2017-02-08 PROCEDURE — 94760 N-INVAS EAR/PLS OXIMETRY 1: CPT

## 2017-02-08 RX ORDER — DEXTROSE AND SODIUM CHLORIDE 5; .45 G/100ML; G/100ML
100 INJECTION, SOLUTION INTRAVENOUS CONTINUOUS
Status: DISCONTINUED | OUTPATIENT
Start: 2017-02-08 | End: 2017-02-09

## 2017-02-08 RX ADMIN — GUAIFENESIN 600 MG: 600 TABLET, EXTENDED RELEASE ORAL at 09:51

## 2017-02-08 RX ADMIN — ASPIRIN 81 MG CHEWABLE TABLET 81 MG: 81 TABLET CHEWABLE at 09:52

## 2017-02-08 RX ADMIN — PIPERACILLIN SODIUM,TAZOBACTAM SODIUM 4.5 G: 4; .5 INJECTION, POWDER, FOR SOLUTION INTRAVENOUS at 19:27

## 2017-02-08 RX ADMIN — DORZOLAMIDE HYDROCHLORIDE 1 DROP: 20 SOLUTION/ DROPS OPHTHALMIC at 09:59

## 2017-02-08 RX ADMIN — IPRATROPIUM BROMIDE 0.5 MG: 0.5 SOLUTION RESPIRATORY (INHALATION) at 14:42

## 2017-02-08 RX ADMIN — HYDROCORTISONE: 10 CREAM TOPICAL at 13:27

## 2017-02-08 RX ADMIN — POTASSIUM CHLORIDE 10 MEQ: 750 TABLET, EXTENDED RELEASE ORAL at 09:52

## 2017-02-08 RX ADMIN — IPRATROPIUM BROMIDE 0.5 MG: 0.5 SOLUTION RESPIRATORY (INHALATION) at 19:36

## 2017-02-08 RX ADMIN — NYSTATIN 1 APPLICATION: 100000 POWDER TOPICAL at 13:26

## 2017-02-08 RX ADMIN — IPRATROPIUM BROMIDE 0.5 MG: 0.5 SOLUTION RESPIRATORY (INHALATION) at 09:10

## 2017-02-08 RX ADMIN — PIPERACILLIN SODIUM,TAZOBACTAM SODIUM 4.5 G: 4; .5 INJECTION, POWDER, FOR SOLUTION INTRAVENOUS at 00:00

## 2017-02-08 RX ADMIN — VANCOMYCIN HYDROCHLORIDE 750 MG: 750 INJECTION, SOLUTION INTRAVENOUS at 09:52

## 2017-02-08 RX ADMIN — LEVALBUTEROL HYDROCHLORIDE 1.25 MG: 1.25 SOLUTION, CONCENTRATE RESPIRATORY (INHALATION) at 14:42

## 2017-02-08 RX ADMIN — DEXTROSE AND SODIUM CHLORIDE 100 ML/HR: 5; .45 INJECTION, SOLUTION INTRAVENOUS at 13:25

## 2017-02-08 RX ADMIN — CYANOCOBALAMIN TAB 500 MCG 1000 MCG: 500 TAB at 09:51

## 2017-02-08 RX ADMIN — ENOXAPARIN SODIUM 30 MG: 40 INJECTION SUBCUTANEOUS at 09:55

## 2017-02-08 RX ADMIN — FUROSEMIDE 40 MG: 40 TABLET ORAL at 09:51

## 2017-02-08 RX ADMIN — LEVALBUTEROL HYDROCHLORIDE 1.25 MG: 1.25 SOLUTION, CONCENTRATE RESPIRATORY (INHALATION) at 19:36

## 2017-02-08 RX ADMIN — LEVALBUTEROL HYDROCHLORIDE 1.25 MG: 1.25 SOLUTION, CONCENTRATE RESPIRATORY (INHALATION) at 09:10

## 2017-02-08 RX ADMIN — BIMATOPROST 1 DROP: 0.1 SOLUTION/ DROPS OPHTHALMIC at 22:23

## 2017-02-08 RX ADMIN — NYSTATIN 1 APPLICATION: 100000 POWDER TOPICAL at 19:25

## 2017-02-08 RX ADMIN — DORZOLAMIDE HYDROCHLORIDE 1 DROP: 20 SOLUTION/ DROPS OPHTHALMIC at 19:25

## 2017-02-08 RX ADMIN — VANCOMYCIN HYDROCHLORIDE 750 MG: 750 INJECTION, SOLUTION INTRAVENOUS at 22:19

## 2017-02-08 RX ADMIN — PIPERACILLIN SODIUM,TAZOBACTAM SODIUM 4.5 G: 4; .5 INJECTION, POWDER, FOR SOLUTION INTRAVENOUS at 07:00

## 2017-02-08 RX ADMIN — PIPERACILLIN SODIUM,TAZOBACTAM SODIUM 4.5 G: 4; .5 INJECTION, POWDER, FOR SOLUTION INTRAVENOUS at 13:22

## 2017-02-09 LAB
ANION GAP SERPL CALCULATED.3IONS-SCNC: 6 MMOL/L (ref 4–13)
BUN SERPL-MCNC: 15 MG/DL (ref 5–25)
CALCIUM SERPL-MCNC: 8.8 MG/DL (ref 8.3–10.1)
CHLORIDE SERPL-SCNC: 105 MMOL/L (ref 100–108)
CO2 SERPL-SCNC: 32 MMOL/L (ref 21–32)
CREAT SERPL-MCNC: 1.1 MG/DL (ref 0.6–1.3)
GFR SERPL CREATININE-BSD FRML MDRD: >60 ML/MIN/1.73SQ M
GLUCOSE SERPL-MCNC: 120 MG/DL (ref 65–140)
POTASSIUM SERPL-SCNC: 3.2 MMOL/L (ref 3.5–5.3)
SODIUM SERPL-SCNC: 143 MMOL/L (ref 136–145)
VANCOMYCIN TROUGH SERPL-MCNC: 24.4 UG/ML (ref 10–20)

## 2017-02-09 PROCEDURE — 80048 BASIC METABOLIC PNL TOTAL CA: CPT | Performed by: INTERNAL MEDICINE

## 2017-02-09 PROCEDURE — 80202 ASSAY OF VANCOMYCIN: CPT | Performed by: INTERNAL MEDICINE

## 2017-02-09 PROCEDURE — 94760 N-INVAS EAR/PLS OXIMETRY 1: CPT

## 2017-02-09 PROCEDURE — 94669 MECHANICAL CHEST WALL OSCILL: CPT

## 2017-02-09 PROCEDURE — 94640 AIRWAY INHALATION TREATMENT: CPT

## 2017-02-09 RX ORDER — VANCOMYCIN HYDROCHLORIDE 1 G/200ML
1000 INJECTION, SOLUTION INTRAVENOUS EVERY 24 HOURS
Status: COMPLETED | OUTPATIENT
Start: 2017-02-10 | End: 2017-02-14

## 2017-02-09 RX ORDER — DEXTROSE, SODIUM CHLORIDE, AND POTASSIUM CHLORIDE 5; .45; .15 G/100ML; G/100ML; G/100ML
75 INJECTION INTRAVENOUS CONTINUOUS
Status: DISCONTINUED | OUTPATIENT
Start: 2017-02-09 | End: 2017-02-14 | Stop reason: HOSPADM

## 2017-02-09 RX ADMIN — VANCOMYCIN HYDROCHLORIDE 1000 MG: 1 INJECTION, SOLUTION INTRAVENOUS at 23:24

## 2017-02-09 RX ADMIN — LEVALBUTEROL HYDROCHLORIDE 1.25 MG: 1.25 SOLUTION, CONCENTRATE RESPIRATORY (INHALATION) at 08:39

## 2017-02-09 RX ADMIN — PIPERACILLIN SODIUM,TAZOBACTAM SODIUM 4.5 G: 4; .5 INJECTION, POWDER, FOR SOLUTION INTRAVENOUS at 06:13

## 2017-02-09 RX ADMIN — ENOXAPARIN SODIUM 30 MG: 40 INJECTION SUBCUTANEOUS at 09:55

## 2017-02-09 RX ADMIN — IPRATROPIUM BROMIDE 0.5 MG: 0.5 SOLUTION RESPIRATORY (INHALATION) at 08:39

## 2017-02-09 RX ADMIN — NYSTATIN 1 APPLICATION: 100000 POWDER TOPICAL at 09:56

## 2017-02-09 RX ADMIN — PIPERACILLIN SODIUM,TAZOBACTAM SODIUM 4.5 G: 4; .5 INJECTION, POWDER, FOR SOLUTION INTRAVENOUS at 12:37

## 2017-02-09 RX ADMIN — PIPERACILLIN SODIUM,TAZOBACTAM SODIUM 4.5 G: 4; .5 INJECTION, POWDER, FOR SOLUTION INTRAVENOUS at 00:32

## 2017-02-09 RX ADMIN — DORZOLAMIDE HYDROCHLORIDE 1 DROP: 20 SOLUTION/ DROPS OPHTHALMIC at 09:55

## 2017-02-09 RX ADMIN — IPRATROPIUM BROMIDE 0.5 MG: 0.5 SOLUTION RESPIRATORY (INHALATION) at 14:51

## 2017-02-09 RX ADMIN — VANCOMYCIN HYDROCHLORIDE 750 MG: 750 INJECTION, SOLUTION INTRAVENOUS at 09:55

## 2017-02-09 RX ADMIN — BIMATOPROST 1 DROP: 0.1 SOLUTION/ DROPS OPHTHALMIC at 23:21

## 2017-02-09 RX ADMIN — DORZOLAMIDE HYDROCHLORIDE 1 DROP: 20 SOLUTION/ DROPS OPHTHALMIC at 18:10

## 2017-02-09 RX ADMIN — NYSTATIN 1 APPLICATION: 100000 POWDER TOPICAL at 18:10

## 2017-02-09 RX ADMIN — IPRATROPIUM BROMIDE 0.5 MG: 0.5 SOLUTION RESPIRATORY (INHALATION) at 20:48

## 2017-02-09 RX ADMIN — LEVALBUTEROL HYDROCHLORIDE 1.25 MG: 1.25 SOLUTION, CONCENTRATE RESPIRATORY (INHALATION) at 20:48

## 2017-02-09 RX ADMIN — LEVALBUTEROL HYDROCHLORIDE 1.25 MG: 1.25 SOLUTION, CONCENTRATE RESPIRATORY (INHALATION) at 14:51

## 2017-02-09 RX ADMIN — DEXTROSE, SODIUM CHLORIDE, AND POTASSIUM CHLORIDE 100 ML/HR: 5; .45; .15 INJECTION INTRAVENOUS at 12:33

## 2017-02-09 RX ADMIN — PIPERACILLIN SODIUM,TAZOBACTAM SODIUM 4.5 G: 4; .5 INJECTION, POWDER, FOR SOLUTION INTRAVENOUS at 18:11

## 2017-02-10 LAB
ANION GAP SERPL CALCULATED.3IONS-SCNC: 9 MMOL/L (ref 4–13)
BUN SERPL-MCNC: 12 MG/DL (ref 5–25)
CALCIUM SERPL-MCNC: 8.8 MG/DL (ref 8.3–10.1)
CHLORIDE SERPL-SCNC: 106 MMOL/L (ref 100–108)
CO2 SERPL-SCNC: 28 MMOL/L (ref 21–32)
CREAT SERPL-MCNC: 1.07 MG/DL (ref 0.6–1.3)
ERYTHROCYTE [DISTWIDTH] IN BLOOD BY AUTOMATED COUNT: 12.2 % (ref 11.6–15.1)
GFR SERPL CREATININE-BSD FRML MDRD: >60 ML/MIN/1.73SQ M
GLUCOSE SERPL-MCNC: 113 MG/DL (ref 65–140)
HCT VFR BLD AUTO: 36.2 % (ref 36.5–49.3)
HGB BLD-MCNC: 11.9 G/DL (ref 12–17)
MCH RBC QN AUTO: 31.2 PG (ref 26.8–34.3)
MCHC RBC AUTO-ENTMCNC: 32.9 G/DL (ref 31.4–37.4)
MCV RBC AUTO: 95 FL (ref 82–98)
PLATELET # BLD AUTO: 266 THOUSANDS/UL (ref 149–390)
PMV BLD AUTO: 9.1 FL (ref 8.9–12.7)
POTASSIUM SERPL-SCNC: 3.5 MMOL/L (ref 3.5–5.3)
RBC # BLD AUTO: 3.82 MILLION/UL (ref 3.88–5.62)
SODIUM SERPL-SCNC: 143 MMOL/L (ref 136–145)
WBC # BLD AUTO: 4.38 THOUSAND/UL (ref 4.31–10.16)

## 2017-02-10 PROCEDURE — 80048 BASIC METABOLIC PNL TOTAL CA: CPT | Performed by: INTERNAL MEDICINE

## 2017-02-10 PROCEDURE — 94640 AIRWAY INHALATION TREATMENT: CPT

## 2017-02-10 PROCEDURE — 92526 ORAL FUNCTION THERAPY: CPT

## 2017-02-10 PROCEDURE — 94760 N-INVAS EAR/PLS OXIMETRY 1: CPT

## 2017-02-10 PROCEDURE — 85027 COMPLETE CBC AUTOMATED: CPT | Performed by: INTERNAL MEDICINE

## 2017-02-10 RX ADMIN — DEXTROSE, SODIUM CHLORIDE, AND POTASSIUM CHLORIDE 100 ML/HR: 5; .45; .15 INJECTION INTRAVENOUS at 00:25

## 2017-02-10 RX ADMIN — PIPERACILLIN SODIUM,TAZOBACTAM SODIUM 4.5 G: 4; .5 INJECTION, POWDER, FOR SOLUTION INTRAVENOUS at 12:02

## 2017-02-10 RX ADMIN — ENOXAPARIN SODIUM 30 MG: 40 INJECTION SUBCUTANEOUS at 09:19

## 2017-02-10 RX ADMIN — IPRATROPIUM BROMIDE 0.5 MG: 0.5 SOLUTION RESPIRATORY (INHALATION) at 08:11

## 2017-02-10 RX ADMIN — LEVALBUTEROL HYDROCHLORIDE 1.25 MG: 1.25 SOLUTION, CONCENTRATE RESPIRATORY (INHALATION) at 20:03

## 2017-02-10 RX ADMIN — DORZOLAMIDE HYDROCHLORIDE 1 DROP: 20 SOLUTION/ DROPS OPHTHALMIC at 09:19

## 2017-02-10 RX ADMIN — LEVALBUTEROL HYDROCHLORIDE 1.25 MG: 1.25 SOLUTION, CONCENTRATE RESPIRATORY (INHALATION) at 13:32

## 2017-02-10 RX ADMIN — PIPERACILLIN SODIUM,TAZOBACTAM SODIUM 4.5 G: 4; .5 INJECTION, POWDER, FOR SOLUTION INTRAVENOUS at 23:59

## 2017-02-10 RX ADMIN — IPRATROPIUM BROMIDE 0.5 MG: 0.5 SOLUTION RESPIRATORY (INHALATION) at 13:32

## 2017-02-10 RX ADMIN — VANCOMYCIN HYDROCHLORIDE 1000 MG: 1 INJECTION, SOLUTION INTRAVENOUS at 22:58

## 2017-02-10 RX ADMIN — PIPERACILLIN SODIUM,TAZOBACTAM SODIUM 4.5 G: 4; .5 INJECTION, POWDER, FOR SOLUTION INTRAVENOUS at 06:35

## 2017-02-10 RX ADMIN — NYSTATIN 1 APPLICATION: 100000 POWDER TOPICAL at 17:47

## 2017-02-10 RX ADMIN — PIPERACILLIN SODIUM,TAZOBACTAM SODIUM 4.5 G: 4; .5 INJECTION, POWDER, FOR SOLUTION INTRAVENOUS at 17:47

## 2017-02-10 RX ADMIN — BIMATOPROST 1 DROP: 0.1 SOLUTION/ DROPS OPHTHALMIC at 22:58

## 2017-02-10 RX ADMIN — DEXTROSE, SODIUM CHLORIDE, AND POTASSIUM CHLORIDE 100 ML/HR: 5; .45; .15 INJECTION INTRAVENOUS at 14:20

## 2017-02-10 RX ADMIN — LEVALBUTEROL HYDROCHLORIDE 1.25 MG: 1.25 SOLUTION, CONCENTRATE RESPIRATORY (INHALATION) at 08:11

## 2017-02-10 RX ADMIN — DONEPEZIL HYDROCHLORIDE 10 MG: 5 TABLET, FILM COATED ORAL at 22:58

## 2017-02-10 RX ADMIN — IPRATROPIUM BROMIDE 0.5 MG: 0.5 SOLUTION RESPIRATORY (INHALATION) at 20:03

## 2017-02-10 RX ADMIN — NYSTATIN 1 APPLICATION: 100000 POWDER TOPICAL at 09:19

## 2017-02-10 RX ADMIN — PIPERACILLIN SODIUM,TAZOBACTAM SODIUM 4.5 G: 4; .5 INJECTION, POWDER, FOR SOLUTION INTRAVENOUS at 00:25

## 2017-02-10 RX ADMIN — DORZOLAMIDE HYDROCHLORIDE 1 DROP: 20 SOLUTION/ DROPS OPHTHALMIC at 17:48

## 2017-02-11 LAB
ANION GAP SERPL CALCULATED.3IONS-SCNC: 9 MMOL/L (ref 4–13)
BUN SERPL-MCNC: 9 MG/DL (ref 5–25)
CALCIUM SERPL-MCNC: 8.8 MG/DL (ref 8.3–10.1)
CHLORIDE SERPL-SCNC: 108 MMOL/L (ref 100–108)
CO2 SERPL-SCNC: 27 MMOL/L (ref 21–32)
CREAT SERPL-MCNC: 1.02 MG/DL (ref 0.6–1.3)
GFR SERPL CREATININE-BSD FRML MDRD: >60 ML/MIN/1.73SQ M
GLUCOSE SERPL-MCNC: 107 MG/DL (ref 65–140)
POTASSIUM SERPL-SCNC: 3.6 MMOL/L (ref 3.5–5.3)
SODIUM SERPL-SCNC: 144 MMOL/L (ref 136–145)

## 2017-02-11 PROCEDURE — 94668 MNPJ CHEST WALL SBSQ: CPT

## 2017-02-11 PROCEDURE — 94760 N-INVAS EAR/PLS OXIMETRY 1: CPT

## 2017-02-11 PROCEDURE — 94640 AIRWAY INHALATION TREATMENT: CPT

## 2017-02-11 PROCEDURE — 80048 BASIC METABOLIC PNL TOTAL CA: CPT | Performed by: INTERNAL MEDICINE

## 2017-02-11 RX ADMIN — BIMATOPROST 1 DROP: 0.1 SOLUTION/ DROPS OPHTHALMIC at 22:56

## 2017-02-11 RX ADMIN — CYANOCOBALAMIN TAB 500 MCG 1000 MCG: 500 TAB at 09:23

## 2017-02-11 RX ADMIN — HYDROCORTISONE: 10 CREAM TOPICAL at 09:23

## 2017-02-11 RX ADMIN — IPRATROPIUM BROMIDE 0.5 MG: 0.5 SOLUTION RESPIRATORY (INHALATION) at 13:17

## 2017-02-11 RX ADMIN — DEXTROSE, SODIUM CHLORIDE, AND POTASSIUM CHLORIDE 75 ML/HR: 5; .45; .15 INJECTION INTRAVENOUS at 22:56

## 2017-02-11 RX ADMIN — NYSTATIN 1 APPLICATION: 100000 POWDER TOPICAL at 17:38

## 2017-02-11 RX ADMIN — PIPERACILLIN SODIUM,TAZOBACTAM SODIUM 4.5 G: 4; .5 INJECTION, POWDER, FOR SOLUTION INTRAVENOUS at 06:00

## 2017-02-11 RX ADMIN — DONEPEZIL HYDROCHLORIDE 10 MG: 5 TABLET, FILM COATED ORAL at 22:56

## 2017-02-11 RX ADMIN — DEXTROSE, SODIUM CHLORIDE, AND POTASSIUM CHLORIDE 75 ML/HR: 5; .45; .15 INJECTION INTRAVENOUS at 08:28

## 2017-02-11 RX ADMIN — ENOXAPARIN SODIUM 30 MG: 40 INJECTION SUBCUTANEOUS at 09:23

## 2017-02-11 RX ADMIN — LEVALBUTEROL HYDROCHLORIDE 1.25 MG: 1.25 SOLUTION, CONCENTRATE RESPIRATORY (INHALATION) at 20:12

## 2017-02-11 RX ADMIN — LEVALBUTEROL HYDROCHLORIDE 1.25 MG: 1.25 SOLUTION, CONCENTRATE RESPIRATORY (INHALATION) at 13:17

## 2017-02-11 RX ADMIN — VANCOMYCIN HYDROCHLORIDE 1000 MG: 1 INJECTION, SOLUTION INTRAVENOUS at 22:56

## 2017-02-11 RX ADMIN — IPRATROPIUM BROMIDE 0.5 MG: 0.5 SOLUTION RESPIRATORY (INHALATION) at 20:12

## 2017-02-11 RX ADMIN — PIPERACILLIN SODIUM,TAZOBACTAM SODIUM 4.5 G: 4; .5 INJECTION, POWDER, FOR SOLUTION INTRAVENOUS at 12:41

## 2017-02-11 RX ADMIN — DORZOLAMIDE HYDROCHLORIDE 1 DROP: 20 SOLUTION/ DROPS OPHTHALMIC at 22:56

## 2017-02-11 RX ADMIN — DORZOLAMIDE HYDROCHLORIDE 1 DROP: 20 SOLUTION/ DROPS OPHTHALMIC at 09:24

## 2017-02-11 RX ADMIN — PIPERACILLIN SODIUM,TAZOBACTAM SODIUM 4.5 G: 4; .5 INJECTION, POWDER, FOR SOLUTION INTRAVENOUS at 17:37

## 2017-02-11 RX ADMIN — NYSTATIN 1 APPLICATION: 100000 POWDER TOPICAL at 09:24

## 2017-02-11 RX ADMIN — ASPIRIN 81 MG CHEWABLE TABLET 81 MG: 81 TABLET CHEWABLE at 09:23

## 2017-02-12 LAB
BACTERIA BLD CULT: NORMAL
BACTERIA BLD CULT: NORMAL
GLUCOSE SERPL-MCNC: 116 MG/DL (ref 65–140)
VANCOMYCIN TROUGH SERPL-MCNC: 14.7 UG/ML (ref 10–20)

## 2017-02-12 PROCEDURE — 94668 MNPJ CHEST WALL SBSQ: CPT

## 2017-02-12 PROCEDURE — 82948 REAGENT STRIP/BLOOD GLUCOSE: CPT

## 2017-02-12 PROCEDURE — 80202 ASSAY OF VANCOMYCIN: CPT | Performed by: PHYSICIAN ASSISTANT

## 2017-02-12 PROCEDURE — 94760 N-INVAS EAR/PLS OXIMETRY 1: CPT

## 2017-02-12 PROCEDURE — 94640 AIRWAY INHALATION TREATMENT: CPT

## 2017-02-12 RX ADMIN — DONEPEZIL HYDROCHLORIDE 10 MG: 5 TABLET, FILM COATED ORAL at 22:32

## 2017-02-12 RX ADMIN — ASPIRIN 81 MG CHEWABLE TABLET 81 MG: 81 TABLET CHEWABLE at 10:13

## 2017-02-12 RX ADMIN — LEVALBUTEROL HYDROCHLORIDE 1.25 MG: 1.25 SOLUTION, CONCENTRATE RESPIRATORY (INHALATION) at 13:34

## 2017-02-12 RX ADMIN — IPRATROPIUM BROMIDE 0.5 MG: 0.5 SOLUTION RESPIRATORY (INHALATION) at 19:45

## 2017-02-12 RX ADMIN — ENOXAPARIN SODIUM 30 MG: 40 INJECTION SUBCUTANEOUS at 10:14

## 2017-02-12 RX ADMIN — VANCOMYCIN HYDROCHLORIDE 1000 MG: 1 INJECTION, SOLUTION INTRAVENOUS at 23:25

## 2017-02-12 RX ADMIN — NYSTATIN 1 APPLICATION: 100000 POWDER TOPICAL at 10:14

## 2017-02-12 RX ADMIN — DORZOLAMIDE HYDROCHLORIDE 1 DROP: 20 SOLUTION/ DROPS OPHTHALMIC at 10:14

## 2017-02-12 RX ADMIN — LEVALBUTEROL HYDROCHLORIDE 1.25 MG: 1.25 SOLUTION, CONCENTRATE RESPIRATORY (INHALATION) at 19:45

## 2017-02-12 RX ADMIN — BIMATOPROST 1 DROP: 0.1 SOLUTION/ DROPS OPHTHALMIC at 22:32

## 2017-02-12 RX ADMIN — PIPERACILLIN SODIUM,TAZOBACTAM SODIUM 4.5 G: 4; .5 INJECTION, POWDER, FOR SOLUTION INTRAVENOUS at 11:42

## 2017-02-12 RX ADMIN — LEVALBUTEROL HYDROCHLORIDE 1.25 MG: 1.25 SOLUTION, CONCENTRATE RESPIRATORY (INHALATION) at 07:24

## 2017-02-12 RX ADMIN — PIPERACILLIN SODIUM,TAZOBACTAM SODIUM 4.5 G: 4; .5 INJECTION, POWDER, FOR SOLUTION INTRAVENOUS at 18:48

## 2017-02-12 RX ADMIN — CYANOCOBALAMIN TAB 500 MCG 1000 MCG: 500 TAB at 10:14

## 2017-02-12 RX ADMIN — IPRATROPIUM BROMIDE 0.5 MG: 0.5 SOLUTION RESPIRATORY (INHALATION) at 13:34

## 2017-02-12 RX ADMIN — NYSTATIN 1 APPLICATION: 100000 POWDER TOPICAL at 18:48

## 2017-02-12 RX ADMIN — PIPERACILLIN SODIUM,TAZOBACTAM SODIUM 4.5 G: 4; .5 INJECTION, POWDER, FOR SOLUTION INTRAVENOUS at 05:37

## 2017-02-12 RX ADMIN — PIPERACILLIN SODIUM,TAZOBACTAM SODIUM 4.5 G: 4; .5 INJECTION, POWDER, FOR SOLUTION INTRAVENOUS at 01:00

## 2017-02-12 RX ADMIN — IPRATROPIUM BROMIDE 0.5 MG: 0.5 SOLUTION RESPIRATORY (INHALATION) at 07:25

## 2017-02-12 RX ADMIN — DORZOLAMIDE HYDROCHLORIDE 1 DROP: 20 SOLUTION/ DROPS OPHTHALMIC at 18:48

## 2017-02-12 RX ADMIN — DEXTROSE, SODIUM CHLORIDE, AND POTASSIUM CHLORIDE 75 ML/HR: 5; .45; .15 INJECTION INTRAVENOUS at 14:55

## 2017-02-13 PROBLEM — R06.89 ACUTE RESPIRATORY INSUFFICIENCY: Status: ACTIVE | Noted: 2017-02-13

## 2017-02-13 PROBLEM — E87.6 HYPOKALEMIA: Status: ACTIVE | Noted: 2017-02-13

## 2017-02-13 PROBLEM — F03.90 DEMENTIA (HCC): Status: ACTIVE | Noted: 2017-02-13

## 2017-02-13 PROBLEM — R13.10 DYSPHAGIA: Status: ACTIVE | Noted: 2017-02-13

## 2017-02-13 PROBLEM — D64.9 ANEMIA: Status: ACTIVE | Noted: 2017-02-13

## 2017-02-13 PROBLEM — E78.5 HYPERLIPIDEMIA: Status: ACTIVE | Noted: 2017-02-13

## 2017-02-13 PROBLEM — J01.90 ACUTE SINUSITIS: Status: ACTIVE | Noted: 2017-02-13

## 2017-02-13 PROBLEM — D72.819 LEUKOPENIA: Status: ACTIVE | Noted: 2017-02-13

## 2017-02-13 LAB
ANION GAP SERPL CALCULATED.3IONS-SCNC: 8 MMOL/L (ref 4–13)
BUN SERPL-MCNC: 8 MG/DL (ref 5–25)
CALCIUM SERPL-MCNC: 8.3 MG/DL (ref 8.3–10.1)
CHLORIDE SERPL-SCNC: 110 MMOL/L (ref 100–108)
CO2 SERPL-SCNC: 25 MMOL/L (ref 21–32)
CREAT SERPL-MCNC: 1 MG/DL (ref 0.6–1.3)
ERYTHROCYTE [DISTWIDTH] IN BLOOD BY AUTOMATED COUNT: 12.9 % (ref 11.6–15.1)
GFR SERPL CREATININE-BSD FRML MDRD: >60 ML/MIN/1.73SQ M
GLUCOSE SERPL-MCNC: 114 MG/DL (ref 65–140)
HCT VFR BLD AUTO: 31.5 % (ref 36.5–49.3)
HGB BLD-MCNC: 10.4 G/DL (ref 12–17)
MCH RBC QN AUTO: 31.5 PG (ref 26.8–34.3)
MCHC RBC AUTO-ENTMCNC: 33 G/DL (ref 31.4–37.4)
MCV RBC AUTO: 96 FL (ref 82–98)
PLATELET # BLD AUTO: 215 THOUSANDS/UL (ref 149–390)
PMV BLD AUTO: 9.3 FL (ref 8.9–12.7)
POTASSIUM SERPL-SCNC: 3.3 MMOL/L (ref 3.5–5.3)
RBC # BLD AUTO: 3.3 MILLION/UL (ref 3.88–5.62)
SODIUM SERPL-SCNC: 143 MMOL/L (ref 136–145)
WBC # BLD AUTO: 4.19 THOUSAND/UL (ref 4.31–10.16)

## 2017-02-13 PROCEDURE — 97110 THERAPEUTIC EXERCISES: CPT

## 2017-02-13 PROCEDURE — 97530 THERAPEUTIC ACTIVITIES: CPT

## 2017-02-13 PROCEDURE — 85027 COMPLETE CBC AUTOMATED: CPT | Performed by: PHYSICIAN ASSISTANT

## 2017-02-13 PROCEDURE — 92526 ORAL FUNCTION THERAPY: CPT

## 2017-02-13 PROCEDURE — 94668 MNPJ CHEST WALL SBSQ: CPT

## 2017-02-13 PROCEDURE — 94760 N-INVAS EAR/PLS OXIMETRY 1: CPT

## 2017-02-13 PROCEDURE — 80048 BASIC METABOLIC PNL TOTAL CA: CPT | Performed by: PHYSICIAN ASSISTANT

## 2017-02-13 PROCEDURE — 94640 AIRWAY INHALATION TREATMENT: CPT

## 2017-02-13 RX ORDER — HEPARIN SODIUM 5000 [USP'U]/ML
5000 INJECTION, SOLUTION INTRAVENOUS; SUBCUTANEOUS EVERY 8 HOURS SCHEDULED
Status: DISCONTINUED | OUTPATIENT
Start: 2017-02-14 | End: 2017-02-14 | Stop reason: HOSPADM

## 2017-02-13 RX ORDER — ACETAMINOPHEN 325 MG/1
650 TABLET ORAL EVERY 6 HOURS PRN
Status: DISCONTINUED | OUTPATIENT
Start: 2017-02-13 | End: 2017-02-14 | Stop reason: HOSPADM

## 2017-02-13 RX ORDER — POTASSIUM CHLORIDE 20 MEQ/1
40 TABLET, EXTENDED RELEASE ORAL ONCE
Status: COMPLETED | OUTPATIENT
Start: 2017-02-13 | End: 2017-02-13

## 2017-02-13 RX ADMIN — LEVALBUTEROL HYDROCHLORIDE 1.25 MG: 1.25 SOLUTION, CONCENTRATE RESPIRATORY (INHALATION) at 13:54

## 2017-02-13 RX ADMIN — IPRATROPIUM BROMIDE 0.5 MG: 0.5 SOLUTION RESPIRATORY (INHALATION) at 13:54

## 2017-02-13 RX ADMIN — LEVALBUTEROL HYDROCHLORIDE 1.25 MG: 1.25 SOLUTION, CONCENTRATE RESPIRATORY (INHALATION) at 20:03

## 2017-02-13 RX ADMIN — ENOXAPARIN SODIUM 30 MG: 40 INJECTION SUBCUTANEOUS at 09:53

## 2017-02-13 RX ADMIN — DORZOLAMIDE HYDROCHLORIDE 1 DROP: 20 SOLUTION/ DROPS OPHTHALMIC at 17:52

## 2017-02-13 RX ADMIN — VANCOMYCIN HYDROCHLORIDE 1000 MG: 1 INJECTION, SOLUTION INTRAVENOUS at 23:02

## 2017-02-13 RX ADMIN — DEXTROSE, SODIUM CHLORIDE, AND POTASSIUM CHLORIDE 75 ML/HR: 5; .45; .15 INJECTION INTRAVENOUS at 19:34

## 2017-02-13 RX ADMIN — POTASSIUM CHLORIDE 40 MEQ: 1500 TABLET, EXTENDED RELEASE ORAL at 14:32

## 2017-02-13 RX ADMIN — PIPERACILLIN SODIUM,TAZOBACTAM SODIUM 4.5 G: 4; .5 INJECTION, POWDER, FOR SOLUTION INTRAVENOUS at 05:27

## 2017-02-13 RX ADMIN — PIPERACILLIN SODIUM,TAZOBACTAM SODIUM 3.38 G: 3; .375 INJECTION, POWDER, FOR SOLUTION INTRAVENOUS at 17:51

## 2017-02-13 RX ADMIN — IPRATROPIUM BROMIDE 0.5 MG: 0.5 SOLUTION RESPIRATORY (INHALATION) at 20:02

## 2017-02-13 RX ADMIN — CYANOCOBALAMIN TAB 500 MCG 1000 MCG: 500 TAB at 09:53

## 2017-02-13 RX ADMIN — DONEPEZIL HYDROCHLORIDE 10 MG: 5 TABLET, FILM COATED ORAL at 23:01

## 2017-02-13 RX ADMIN — ASPIRIN 81 MG CHEWABLE TABLET 81 MG: 81 TABLET CHEWABLE at 09:53

## 2017-02-13 RX ADMIN — NYSTATIN 1 APPLICATION: 100000 POWDER TOPICAL at 17:52

## 2017-02-13 RX ADMIN — PIPERACILLIN SODIUM,TAZOBACTAM SODIUM 4.5 G: 4; .5 INJECTION, POWDER, FOR SOLUTION INTRAVENOUS at 00:39

## 2017-02-13 RX ADMIN — DORZOLAMIDE HYDROCHLORIDE 1 DROP: 20 SOLUTION/ DROPS OPHTHALMIC at 09:53

## 2017-02-13 RX ADMIN — NYSTATIN 1 APPLICATION: 100000 POWDER TOPICAL at 09:53

## 2017-02-13 RX ADMIN — BIMATOPROST 1 DROP: 0.1 SOLUTION/ DROPS OPHTHALMIC at 23:01

## 2017-02-13 RX ADMIN — IPRATROPIUM BROMIDE 0.5 MG: 0.5 SOLUTION RESPIRATORY (INHALATION) at 07:28

## 2017-02-13 RX ADMIN — DEXTROSE, SODIUM CHLORIDE, AND POTASSIUM CHLORIDE 75 ML/HR: 5; .45; .15 INJECTION INTRAVENOUS at 05:29

## 2017-02-13 RX ADMIN — LEVALBUTEROL HYDROCHLORIDE 1.25 MG: 1.25 SOLUTION, CONCENTRATE RESPIRATORY (INHALATION) at 07:28

## 2017-02-14 VITALS
HEART RATE: 68 BPM | HEIGHT: 73 IN | DIASTOLIC BLOOD PRESSURE: 95 MMHG | OXYGEN SATURATION: 100 % | SYSTOLIC BLOOD PRESSURE: 135 MMHG | TEMPERATURE: 97.4 F | BODY MASS INDEX: 24.69 KG/M2 | RESPIRATION RATE: 18 BRPM | WEIGHT: 186.29 LBS

## 2017-02-14 PROBLEM — E83.39 HYPOPHOSPHATEMIA: Status: ACTIVE | Noted: 2017-02-14

## 2017-02-14 PROBLEM — E55.9 VITAMIN D DEFICIENCY: Status: ACTIVE | Noted: 2017-02-14

## 2017-02-14 PROBLEM — G31.9 CEREBELLAR ATROPHY (HCC): Status: ACTIVE | Noted: 2017-02-14

## 2017-02-14 PROBLEM — G31.9 CEREBRAL ATROPHY (HCC): Status: ACTIVE | Noted: 2017-02-14

## 2017-02-14 LAB
25(OH)D3 SERPL-MCNC: 19.3 NG/ML (ref 30–100)
ALBUMIN SERPL BCP-MCNC: 2.7 G/DL (ref 3.5–5)
ALP SERPL-CCNC: 70 U/L (ref 46–116)
ALT SERPL W P-5'-P-CCNC: 27 U/L (ref 12–78)
ANION GAP SERPL CALCULATED.3IONS-SCNC: 8 MMOL/L (ref 4–13)
AST SERPL W P-5'-P-CCNC: 22 U/L (ref 5–45)
BASOPHILS # BLD AUTO: 0.07 THOUSANDS/ΜL (ref 0–0.1)
BASOPHILS NFR BLD AUTO: 2 % (ref 0–1)
BILIRUB SERPL-MCNC: 0.5 MG/DL (ref 0.2–1)
BUN SERPL-MCNC: 5 MG/DL (ref 5–25)
CALCIUM SERPL-MCNC: 9.1 MG/DL (ref 8.3–10.1)
CHLORIDE SERPL-SCNC: 111 MMOL/L (ref 100–108)
CO2 SERPL-SCNC: 26 MMOL/L (ref 21–32)
CREAT SERPL-MCNC: 1 MG/DL (ref 0.6–1.3)
EOSINOPHIL # BLD AUTO: 0.25 THOUSAND/ΜL (ref 0–0.61)
EOSINOPHIL NFR BLD AUTO: 6 % (ref 0–6)
ERYTHROCYTE [DISTWIDTH] IN BLOOD BY AUTOMATED COUNT: 13 % (ref 11.6–15.1)
FOLATE SERPL-MCNC: 15.3 NG/ML (ref 3.1–17.5)
GFR SERPL CREATININE-BSD FRML MDRD: >60 ML/MIN/1.73SQ M
GLUCOSE SERPL-MCNC: 114 MG/DL (ref 65–140)
HCT VFR BLD AUTO: 35.8 % (ref 36.5–49.3)
HGB BLD-MCNC: 11.7 G/DL (ref 12–17)
LACTATE SERPL-SCNC: 1.3 MMOL/L (ref 0.5–2)
LYMPHOCYTES # BLD AUTO: 1.1 THOUSANDS/ΜL (ref 0.6–4.47)
LYMPHOCYTES NFR BLD AUTO: 25 % (ref 14–44)
MAGNESIUM SERPL-MCNC: 1.9 MG/DL (ref 1.6–2.6)
MCH RBC QN AUTO: 31.6 PG (ref 26.8–34.3)
MCHC RBC AUTO-ENTMCNC: 32.7 G/DL (ref 31.4–37.4)
MCV RBC AUTO: 97 FL (ref 82–98)
MONOCYTES # BLD AUTO: 0.25 THOUSAND/ΜL (ref 0.17–1.22)
MONOCYTES NFR BLD AUTO: 6 % (ref 4–12)
NEUTROPHILS # BLD AUTO: 2.72 THOUSANDS/ΜL (ref 1.85–7.62)
NEUTS SEG NFR BLD AUTO: 61 % (ref 43–75)
NRBC BLD AUTO-RTO: 0 /100 WBCS
PHOSPHATE SERPL-MCNC: 2 MG/DL (ref 2.3–4.1)
PLATELET # BLD AUTO: 223 THOUSANDS/UL (ref 149–390)
PMV BLD AUTO: 9.4 FL (ref 8.9–12.7)
POTASSIUM SERPL-SCNC: 3.7 MMOL/L (ref 3.5–5.3)
PROT SERPL-MCNC: 5.6 G/DL (ref 6.4–8.2)
RBC # BLD AUTO: 3.7 MILLION/UL (ref 3.88–5.62)
SODIUM SERPL-SCNC: 145 MMOL/L (ref 136–145)
VIT B12 SERPL-MCNC: 1520 PG/ML (ref 100–900)
WBC # BLD AUTO: 4.44 THOUSAND/UL (ref 4.31–10.16)

## 2017-02-14 PROCEDURE — 94640 AIRWAY INHALATION TREATMENT: CPT

## 2017-02-14 PROCEDURE — 94668 MNPJ CHEST WALL SBSQ: CPT

## 2017-02-14 PROCEDURE — 82306 VITAMIN D 25 HYDROXY: CPT | Performed by: INTERNAL MEDICINE

## 2017-02-14 PROCEDURE — 83605 ASSAY OF LACTIC ACID: CPT | Performed by: INTERNAL MEDICINE

## 2017-02-14 PROCEDURE — 94760 N-INVAS EAR/PLS OXIMETRY 1: CPT

## 2017-02-14 PROCEDURE — 83735 ASSAY OF MAGNESIUM: CPT | Performed by: INTERNAL MEDICINE

## 2017-02-14 PROCEDURE — 80053 COMPREHEN METABOLIC PANEL: CPT | Performed by: INTERNAL MEDICINE

## 2017-02-14 PROCEDURE — 82607 VITAMIN B-12: CPT | Performed by: INTERNAL MEDICINE

## 2017-02-14 PROCEDURE — 84100 ASSAY OF PHOSPHORUS: CPT | Performed by: INTERNAL MEDICINE

## 2017-02-14 PROCEDURE — 85025 COMPLETE CBC W/AUTO DIFF WBC: CPT | Performed by: INTERNAL MEDICINE

## 2017-02-14 PROCEDURE — 82746 ASSAY OF FOLIC ACID SERUM: CPT | Performed by: INTERNAL MEDICINE

## 2017-02-14 RX ORDER — HEPARIN SODIUM 5000 [USP'U]/ML
5000 INJECTION, SOLUTION INTRAVENOUS; SUBCUTANEOUS EVERY 8 HOURS SCHEDULED
Qty: 90 ML | Refills: 0
Start: 2017-02-14 | End: 2017-10-30 | Stop reason: HOSPADM

## 2017-02-14 RX ORDER — DIAPER,BRIEF,INFANT-TODD,DISP
1 EACH MISCELLANEOUS 4 TIMES DAILY PRN
Qty: 30 G | Refills: 0
Start: 2017-02-14 | End: 2017-10-30 | Stop reason: HOSPADM

## 2017-02-14 RX ORDER — ACETAMINOPHEN 325 MG/1
650 TABLET ORAL EVERY 6 HOURS PRN
Qty: 30 TABLET | Refills: 0
Start: 2017-02-14

## 2017-02-14 RX ORDER — MELATONIN
2000 DAILY
Qty: 60 TABLET | Refills: 0
Start: 2017-02-14

## 2017-02-14 RX ORDER — POTASSIUM CHLORIDE 20 MEQ/1
40 TABLET, EXTENDED RELEASE ORAL ONCE
Status: COMPLETED | OUTPATIENT
Start: 2017-02-14 | End: 2017-02-14

## 2017-02-14 RX ADMIN — DORZOLAMIDE HYDROCHLORIDE 1 DROP: 20 SOLUTION/ DROPS OPHTHALMIC at 09:13

## 2017-02-14 RX ADMIN — DIBASIC SODIUM PHOSPHATE, MONOBASIC POTASSIUM PHOSPHATE AND MONOBASIC SODIUM PHOSPHATE 2 TABLET: 852; 155; 130 TABLET ORAL at 12:09

## 2017-02-14 RX ADMIN — NYSTATIN 1 APPLICATION: 100000 POWDER TOPICAL at 09:13

## 2017-02-14 RX ADMIN — IPRATROPIUM BROMIDE 0.5 MG: 0.5 SOLUTION RESPIRATORY (INHALATION) at 08:28

## 2017-02-14 RX ADMIN — Medication 400 MG: at 12:09

## 2017-02-14 RX ADMIN — CYANOCOBALAMIN TAB 500 MCG 1000 MCG: 500 TAB at 09:14

## 2017-02-14 RX ADMIN — POTASSIUM CHLORIDE 40 MEQ: 1500 TABLET, EXTENDED RELEASE ORAL at 12:09

## 2017-02-14 RX ADMIN — HEPARIN SODIUM 5000 UNITS: 5000 INJECTION, SOLUTION INTRAVENOUS; SUBCUTANEOUS at 06:27

## 2017-02-14 RX ADMIN — ASPIRIN 81 MG CHEWABLE TABLET 81 MG: 81 TABLET CHEWABLE at 09:14

## 2017-02-14 RX ADMIN — DEXTROSE, SODIUM CHLORIDE, AND POTASSIUM CHLORIDE 75 ML/HR: 5; .45; .15 INJECTION INTRAVENOUS at 06:07

## 2017-02-14 RX ADMIN — LEVALBUTEROL HYDROCHLORIDE 1.25 MG: 1.25 SOLUTION, CONCENTRATE RESPIRATORY (INHALATION) at 08:28

## 2017-07-18 ENCOUNTER — ALLSCRIPTS OFFICE VISIT (OUTPATIENT)
Dept: OTHER | Facility: OTHER | Age: 82
End: 2017-07-18

## 2017-10-19 ENCOUNTER — HOSPITAL ENCOUNTER (INPATIENT)
Facility: HOSPITAL | Age: 82
LOS: 11 days | Discharge: NURSING, REHAB OR LONG-TERM CARE | DRG: 871 | End: 2017-10-30
Attending: EMERGENCY MEDICINE | Admitting: GENERAL PRACTICE
Payer: MEDICARE

## 2017-10-19 ENCOUNTER — APPOINTMENT (EMERGENCY)
Dept: CT IMAGING | Facility: HOSPITAL | Age: 82
DRG: 871 | End: 2017-10-19
Payer: MEDICARE

## 2017-10-19 DIAGNOSIS — J18.9 BILATERAL PNEUMONIA: Primary | ICD-10-CM

## 2017-10-19 DIAGNOSIS — L89.90 PRESSURE ULCER: ICD-10-CM

## 2017-10-19 DIAGNOSIS — J18.9 HEALTHCARE-ASSOCIATED PNEUMONIA: ICD-10-CM

## 2017-10-19 PROBLEM — Z85.6 PERSONAL HISTORY OF CLL (CHRONIC LYMPHOCYTIC LEUKEMIA): Status: ACTIVE | Noted: 2017-10-19

## 2017-10-19 PROBLEM — H40.9 GLAUCOMA: Status: ACTIVE | Noted: 2017-10-19

## 2017-10-19 PROBLEM — E87.2 LACTIC ACIDOSIS: Status: ACTIVE | Noted: 2017-10-19

## 2017-10-19 PROBLEM — F03.90 DEMENTIA (HCC): Status: ACTIVE | Noted: 2017-10-19

## 2017-10-19 LAB
ALBUMIN SERPL BCP-MCNC: 3.4 G/DL (ref 3.5–5)
ALP SERPL-CCNC: 117 U/L (ref 46–116)
ALT SERPL W P-5'-P-CCNC: 29 U/L (ref 12–78)
ANION GAP SERPL CALCULATED.3IONS-SCNC: 12 MMOL/L (ref 4–13)
ANISOCYTOSIS BLD QL SMEAR: PRESENT
APTT PPP: 44 SECONDS (ref 23–35)
AST SERPL W P-5'-P-CCNC: 146 U/L (ref 5–45)
BASOPHILS # BLD MANUAL: 0 THOUSAND/UL (ref 0–0.1)
BASOPHILS NFR MAR MANUAL: 0 % (ref 0–1)
BILIRUB SERPL-MCNC: 0.7 MG/DL (ref 0.2–1)
BUN SERPL-MCNC: 22 MG/DL (ref 5–25)
CALCIUM SERPL-MCNC: 9.9 MG/DL (ref 8.3–10.1)
CHLORIDE SERPL-SCNC: 102 MMOL/L (ref 100–108)
CO2 SERPL-SCNC: 26 MMOL/L (ref 21–32)
CREAT SERPL-MCNC: 1.29 MG/DL (ref 0.6–1.3)
EOSINOPHIL # BLD MANUAL: 0.07 THOUSAND/UL (ref 0–0.4)
EOSINOPHIL NFR BLD MANUAL: 1 % (ref 0–6)
ERYTHROCYTE [DISTWIDTH] IN BLOOD BY AUTOMATED COUNT: 12.6 % (ref 11.6–15.1)
GFR SERPL CREATININE-BSD FRML MDRD: 47 ML/MIN/1.73SQ M
GLUCOSE SERPL-MCNC: 137 MG/DL (ref 65–140)
HCT VFR BLD AUTO: 38.1 % (ref 36.5–49.3)
HGB BLD-MCNC: 12.5 G/DL (ref 12–17)
INR PPP: 1.25 (ref 0.86–1.16)
LACTATE SERPL-SCNC: 1 MMOL/L (ref 0.5–2)
LACTATE SERPL-SCNC: 1 MMOL/L (ref 0.5–2)
LACTATE SERPL-SCNC: 2.4 MMOL/L (ref 0.5–2)
LYMPHOCYTES # BLD AUTO: 2.99 THOUSAND/UL (ref 0.6–4.47)
LYMPHOCYTES # BLD AUTO: 46 % (ref 14–44)
MCH RBC QN AUTO: 31.2 PG (ref 26.8–34.3)
MCHC RBC AUTO-ENTMCNC: 32.8 G/DL (ref 31.4–37.4)
MCV RBC AUTO: 95 FL (ref 82–98)
MONOCYTES # BLD AUTO: 0.13 THOUSAND/UL (ref 0–1.22)
MONOCYTES NFR BLD: 2 % (ref 4–12)
NEUTROPHILS # BLD MANUAL: 2.93 THOUSAND/UL (ref 1.85–7.62)
NEUTS SEG NFR BLD AUTO: 45 % (ref 43–75)
NRBC BLD AUTO-RTO: 0 /100 WBCS
PLATELET # BLD AUTO: 68 THOUSANDS/UL (ref 149–390)
PLATELET # BLD AUTO: 96 THOUSANDS/UL (ref 149–390)
PLATELET BLD QL SMEAR: ABNORMAL
PMV BLD AUTO: 10.1 FL (ref 8.9–12.7)
PMV BLD AUTO: 9.7 FL (ref 8.9–12.7)
POTASSIUM SERPL-SCNC: 4.3 MMOL/L (ref 3.5–5.3)
PROT SERPL-MCNC: 6.8 G/DL (ref 6.4–8.2)
PROTHROMBIN TIME: 16 SECONDS (ref 12.1–14.4)
RBC # BLD AUTO: 4.01 MILLION/UL (ref 3.88–5.62)
SODIUM SERPL-SCNC: 140 MMOL/L (ref 136–145)
TOTAL CELLS COUNTED SPEC: 100
VARIANT LYMPHS # BLD AUTO: 6 %
WBC # BLD AUTO: 6.5 THOUSAND/UL (ref 4.31–10.16)

## 2017-10-19 PROCEDURE — 80053 COMPREHEN METABOLIC PANEL: CPT | Performed by: EMERGENCY MEDICINE

## 2017-10-19 PROCEDURE — 85007 BL SMEAR W/DIFF WBC COUNT: CPT | Performed by: EMERGENCY MEDICINE

## 2017-10-19 PROCEDURE — 83605 ASSAY OF LACTIC ACID: CPT | Performed by: FAMILY MEDICINE

## 2017-10-19 PROCEDURE — 94760 N-INVAS EAR/PLS OXIMETRY 1: CPT

## 2017-10-19 PROCEDURE — 36415 COLL VENOUS BLD VENIPUNCTURE: CPT | Performed by: EMERGENCY MEDICINE

## 2017-10-19 PROCEDURE — 85730 THROMBOPLASTIN TIME PARTIAL: CPT | Performed by: EMERGENCY MEDICINE

## 2017-10-19 PROCEDURE — 99285 EMERGENCY DEPT VISIT HI MDM: CPT

## 2017-10-19 PROCEDURE — 71250 CT THORAX DX C-: CPT

## 2017-10-19 PROCEDURE — 87081 CULTURE SCREEN ONLY: CPT | Performed by: FAMILY MEDICINE

## 2017-10-19 PROCEDURE — 93005 ELECTROCARDIOGRAM TRACING: CPT

## 2017-10-19 PROCEDURE — 85610 PROTHROMBIN TIME: CPT | Performed by: EMERGENCY MEDICINE

## 2017-10-19 PROCEDURE — 85049 AUTOMATED PLATELET COUNT: CPT | Performed by: FAMILY MEDICINE

## 2017-10-19 PROCEDURE — 94640 AIRWAY INHALATION TREATMENT: CPT

## 2017-10-19 PROCEDURE — 85027 COMPLETE CBC AUTOMATED: CPT | Performed by: EMERGENCY MEDICINE

## 2017-10-19 PROCEDURE — 83605 ASSAY OF LACTIC ACID: CPT | Performed by: EMERGENCY MEDICINE

## 2017-10-19 PROCEDURE — 87040 BLOOD CULTURE FOR BACTERIA: CPT | Performed by: EMERGENCY MEDICINE

## 2017-10-19 PROCEDURE — 96365 THER/PROPH/DIAG IV INF INIT: CPT

## 2017-10-19 PROCEDURE — 94664 DEMO&/EVAL PT USE INHALER: CPT

## 2017-10-19 PROCEDURE — 87449 NOS EACH ORGANISM AG IA: CPT | Performed by: FAMILY MEDICINE

## 2017-10-19 RX ORDER — CHOLECALCIFEROL (VITAMIN D3) 125 MCG
250 CAPSULE ORAL DAILY
Status: DISCONTINUED | OUTPATIENT
Start: 2017-10-20 | End: 2017-10-30 | Stop reason: HOSPADM

## 2017-10-19 RX ORDER — 0.9 % SODIUM CHLORIDE 0.9 %
3 VIAL (ML) INJECTION AS NEEDED
Status: DISCONTINUED | OUTPATIENT
Start: 2017-10-19 | End: 2017-10-30 | Stop reason: HOSPADM

## 2017-10-19 RX ORDER — GUAIFENESIN/DEXTROMETHORPHAN 100-10MG/5
5 SYRUP ORAL 3 TIMES DAILY PRN
COMMUNITY
End: 2017-10-30 | Stop reason: HOSPADM

## 2017-10-19 RX ORDER — ACETAMINOPHEN 650 MG/1
650 SUPPOSITORY RECTAL ONCE
Status: COMPLETED | OUTPATIENT
Start: 2017-10-19 | End: 2017-10-19

## 2017-10-19 RX ORDER — SODIUM CHLORIDE 9 MG/ML
75 INJECTION, SOLUTION INTRAVENOUS CONTINUOUS
Status: DISCONTINUED | OUTPATIENT
Start: 2017-10-19 | End: 2017-10-22

## 2017-10-19 RX ORDER — HEPARIN SODIUM 5000 [USP'U]/ML
5000 INJECTION, SOLUTION INTRAVENOUS; SUBCUTANEOUS EVERY 8 HOURS SCHEDULED
Status: DISCONTINUED | OUTPATIENT
Start: 2017-10-19 | End: 2017-10-19 | Stop reason: ALTCHOICE

## 2017-10-19 RX ORDER — ACETAMINOPHEN 325 MG/1
650 TABLET ORAL EVERY 6 HOURS PRN
Status: DISCONTINUED | OUTPATIENT
Start: 2017-10-19 | End: 2017-10-30 | Stop reason: HOSPADM

## 2017-10-19 RX ORDER — ACETAMINOPHEN 325 MG/1
650 TABLET ORAL EVERY 6 HOURS PRN
Status: DISCONTINUED | OUTPATIENT
Start: 2017-10-19 | End: 2017-10-19 | Stop reason: SDUPTHER

## 2017-10-19 RX ORDER — MEMANTINE HYDROCHLORIDE 5 MG/1
10 TABLET ORAL 2 TIMES DAILY
Status: DISCONTINUED | OUTPATIENT
Start: 2017-10-19 | End: 2017-10-30 | Stop reason: HOSPADM

## 2017-10-19 RX ORDER — DORZOLAMIDE HCL 20 MG/ML
1 SOLUTION/ DROPS OPHTHALMIC 2 TIMES DAILY
Status: DISCONTINUED | OUTPATIENT
Start: 2017-10-19 | End: 2017-10-30 | Stop reason: HOSPADM

## 2017-10-19 RX ORDER — LEVOFLOXACIN 750 MG/1
750 TABLET ORAL EVERY 24 HOURS
COMMUNITY
End: 2017-10-30 | Stop reason: HOSPADM

## 2017-10-19 RX ORDER — LEVALBUTEROL 1.25 MG/.5ML
1.25 SOLUTION, CONCENTRATE RESPIRATORY (INHALATION) EVERY 6 HOURS PRN
Status: DISCONTINUED | OUTPATIENT
Start: 2017-10-19 | End: 2017-10-22

## 2017-10-19 RX ORDER — SODIUM CHLORIDE FOR INHALATION 0.9 %
3 VIAL, NEBULIZER (ML) INHALATION EVERY 6 HOURS PRN
Status: DISCONTINUED | OUTPATIENT
Start: 2017-10-19 | End: 2017-10-28

## 2017-10-19 RX ORDER — DONEPEZIL HYDROCHLORIDE 5 MG/1
10 TABLET, FILM COATED ORAL
Status: DISCONTINUED | OUTPATIENT
Start: 2017-10-19 | End: 2017-10-30 | Stop reason: HOSPADM

## 2017-10-19 RX ORDER — ASPIRIN 81 MG/1
81 TABLET, CHEWABLE ORAL DAILY
Status: DISCONTINUED | OUTPATIENT
Start: 2017-10-20 | End: 2017-10-30 | Stop reason: HOSPADM

## 2017-10-19 RX ORDER — MELATONIN
2000 DAILY
Status: DISCONTINUED | OUTPATIENT
Start: 2017-10-20 | End: 2017-10-30 | Stop reason: HOSPADM

## 2017-10-19 RX ORDER — ACETAMINOPHEN 325 MG/1
650 TABLET ORAL ONCE
Status: DISCONTINUED | OUTPATIENT
Start: 2017-10-19 | End: 2017-10-19

## 2017-10-19 RX ADMIN — MEMANTINE HYDROCHLORIDE 10 MG: 5 TABLET ORAL at 22:23

## 2017-10-19 RX ADMIN — PIPERACILLIN SODIUM,TAZOBACTAM SODIUM 3.38 G: 3; .375 INJECTION, POWDER, FOR SOLUTION INTRAVENOUS at 14:22

## 2017-10-19 RX ADMIN — BIMATOPROST 1 DROP: 0.1 SOLUTION/ DROPS OPHTHALMIC at 22:21

## 2017-10-19 RX ADMIN — CEFEPIME HYDROCHLORIDE 2000 MG: 2 INJECTION, POWDER, FOR SOLUTION INTRAVENOUS at 22:18

## 2017-10-19 RX ADMIN — IPRATROPIUM BROMIDE 0.5 MG: 0.5 SOLUTION RESPIRATORY (INHALATION) at 21:43

## 2017-10-19 RX ADMIN — METRONIDAZOLE 500 MG: 500 INJECTION, SOLUTION INTRAVENOUS at 23:34

## 2017-10-19 RX ADMIN — SODIUM CHLORIDE 1000 ML: 0.9 INJECTION, SOLUTION INTRAVENOUS at 14:20

## 2017-10-19 RX ADMIN — SODIUM CHLORIDE 75 ML/HR: 0.9 INJECTION, SOLUTION INTRAVENOUS at 21:18

## 2017-10-19 RX ADMIN — DORZOLAMIDE HYDROCHLORIDE 1 DROP: 20 SOLUTION/ DROPS OPHTHALMIC at 22:21

## 2017-10-19 RX ADMIN — DONEPEZIL HYDROCHLORIDE 10 MG: 5 TABLET ORAL at 22:23

## 2017-10-19 RX ADMIN — VANCOMYCIN HYDROCHLORIDE 1250 MG: 5 INJECTION, POWDER, LYOPHILIZED, FOR SOLUTION INTRAVENOUS at 15:17

## 2017-10-19 RX ADMIN — ACETAMINOPHEN 650 MG: 650 SUPPOSITORY RECTAL at 14:56

## 2017-10-19 NOTE — ED PROVIDER NOTES
History  Chief Complaint   Patient presents with    Fever - 75 years or older     Pt brought via EMS for evaluation of fever and confusion x1 day  Pt diagnosed with pnumonia 3 days prior to arrival     HPI     80year-old man with recurrent aspiration and history earlier this year bilateral pneumonia presents with similar symptoms of fever cough congestion and worsening confusion than usual   Fever as high as 102 0 at his nursing home  Was diagnosed with pneumonia 3 days ago but uncertain the exact way this was caught  He was started on Levaquin which has not helped him  He continues to worsen in his mental status  Not producing any sputum with cough  Eating and drinking as usual, not very well because he has aspiration issues  He and family had denied G-tube evaluation and treatment several months ago, thus he is still p o  No recent hospitalizations well as a nursing home  No other history available at this time  Full code  Medical decision making:    Impression:  Likely pneumonia, also likely to be from aspiration given his history of difficulty swallowing  We will move forward with septic evaluation and empiric treatment with vancomycin and Zosyn for anaerobic and aspirated organisms  Prior to Admission Medications   Prescriptions Last Dose Informant Patient Reported? Taking?    Heparin Sodium, Porcine, (HEPARIN, PORCINE,) 5,000 units/mL   No No   Sig: Inject 1 mL under the skin every 8 (eight) hours   Memantine HCl ER (NAMENDA XR) 28 MG CP24   Yes No   Sig: Take 1 capsule by mouth daily   Multiple Vitamin (DAILY ROHIT PO)   Yes No   Sig: Take 1 tablet by mouth daily   Multiple Vitamins-Minerals (PRESERVISION/LUTEIN PO)   Yes No   Sig: Take 1 tablet by mouth daily   acetaminophen (TYLENOL) 325 mg tablet   No No   Sig: Take 2 tablets by mouth every 6 (six) hours as needed for mild pain, moderate pain, headaches or fever   aspirin 81 mg chewable tablet   Yes No   Sig: Chew 81 mg daily bimatoprost (LUMIGAN) 0 01 % ophthalmic drops   Yes No   Sig: Administer 1 drop to both eyes daily at bedtime   cholecalciferol (VITAMIN D3) 1,000 units tablet   No No   Sig: Take 2 tablets by mouth daily   cyanocobalamin 250 MCG tablet   No No   Sig: Take 1 tablet by mouth daily   donepezil (ARICEPT) 10 mg tablet   Yes No   Sig: Take 10 mg by mouth daily at bedtime   dorzolamide (TRUSOPT) 2 % ophthalmic solution   Yes No   Sig: Administer 1 drop to both eyes 2 (two) times a day   hydrocortisone 1 % cream   No No   Sig: Apply 1 application topically 4 (four) times a day as needed for irritation or rash   nystatin (MYCOSTATIN) powder   Yes No   Sig: Apply 1 application topically 2 (two) times a day      Facility-Administered Medications: None       Past Medical History:   Diagnosis Date    Dementia     Dermatitis     Edema     Glaucoma     Hyperlipidemia     Neuropathy     Vitamin B 12 deficiency        History reviewed  No pertinent surgical history  Family History   Problem Relation Age of Onset    No Known Problems Mother     No Known Problems Father      I have reviewed and agree with the history as documented  Social History   Substance Use Topics    Smoking status: Never Smoker    Smokeless tobacco: Never Used    Alcohol use Yes      Comment: occasional        Review of Systems   Constitutional: Positive for fever  Negative for appetite change, diaphoresis and fatigue  HENT: Negative for congestion, dental problem, drooling, ear discharge, ear pain, postnasal drip, rhinorrhea, sore throat, trouble swallowing and voice change  Eyes: Negative for photophobia, pain, discharge, redness and visual disturbance  Respiratory: Positive for cough and shortness of breath  Negative for choking, chest tightness, wheezing and stridor  Cardiovascular: Negative for chest pain, palpitations and leg swelling     Gastrointestinal: Negative for abdominal pain, blood in stool, constipation, diarrhea, nausea and vomiting  Endocrine: Negative  Genitourinary: Negative  Musculoskeletal: Negative  Skin: Negative  Allergic/Immunologic: Negative  Neurological: Negative  Hematological: Negative  Psychiatric/Behavioral: Positive for confusion  Physical Exam  ED Triage Vitals   Temperature Pulse Respirations Blood Pressure SpO2   10/19/17 1302 10/19/17 1300 10/19/17 1300 10/19/17 1302 10/19/17 1300   98 8 °F (37 1 °C) 91 (!) 23 159/68 94 %      Temp Source Heart Rate Source Patient Position - Orthostatic VS BP Location FiO2 (%)   10/19/17 1302 10/19/17 1300 10/19/17 1300 10/19/17 1300 --   Oral Monitor Lying Right arm       Pain Score       --                  Physical Exam   Constitutional: He appears well-developed and well-nourished  He appears distressed (uncomfortable)  Elderly frail man  HENT:   Head: Normocephalic and atraumatic  Nose: Nose normal    Mouth/Throat: Oropharynx is clear and moist    Eyes: Conjunctivae and EOM are normal  Pupils are equal, round, and reactive to light  Neck: Normal range of motion  Neck supple  No thyromegaly present  Cardiovascular: Normal rate, regular rhythm, normal heart sounds and intact distal pulses  Exam reveals no gallop and no friction rub  No murmur heard  Pulmonary/Chest: Effort normal  No stridor  No respiratory distress  He has no wheezes  He has rales (bases)  He exhibits no tenderness  Abdominal: Soft  Bowel sounds are normal  There is no tenderness  There is no guarding  Musculoskeletal: Normal range of motion  He exhibits no deformity  Neurological: He is alert  No cranial nerve deficit  He exhibits normal muscle tone  Coordination normal    Oriented to self  Skin: Skin is warm and dry  Psychiatric: He has a normal mood and affect  Vitals reviewed        ED Medications  Medications   sodium chloride (PF) 0 9 % injection 3 mL (not administered)   vancomycin (VANCOCIN) 1,250 mg in sodium chloride 0 9 % 250 mL IVPB (1,250 mg Intravenous New Bag 10/19/17 1517)   piperacillin-tazobactam (ZOSYN) 3 375 g in sodium chloride 0 9 % 50 mL IVPB (0 g Intravenous Stopped 10/19/17 1522)   sodium chloride 0 9 % bolus 1,000 mL (0 mL Intravenous Stopped 10/19/17 1503)   acetaminophen (TYLENOL) rectal suppository 650 mg (650 mg Rectal Given 10/19/17 1456)       Diagnostic Studies  Labs Reviewed   CBC AND DIFFERENTIAL - Abnormal        Result Value Ref Range Status    Platelets 96 (*) 808 - 390 Thousands/uL Final    WBC 6 50  4 31 - 10 16 Thousand/uL Final    RBC 4 01  3 88 - 5 62 Million/uL Final    Hemoglobin 12 5  12 0 - 17 0 g/dL Final    Hematocrit 38 1  36 5 - 49 3 % Final    MCV 95  82 - 98 fL Final    MCH 31 2  26 8 - 34 3 pg Final    MCHC 32 8  31 4 - 37 4 g/dL Final    RDW 12 6  11 6 - 15 1 % Final    MPV 10 1  8 9 - 12 7 fL Final    nRBC 0  /100 WBCs Final   COMPREHENSIVE METABOLIC PANEL - Abnormal      (*) 5 - 45 U/L Final    Comment:   Specimen collection should occur prior to Sulfasalazine administration due to the potential for falsely depressed results  Alkaline Phosphatase 117 (*) 46 - 116 U/L Final    Albumin 3 4 (*) 3 5 - 5 0 g/dL Final    Sodium 140  136 - 145 mmol/L Final    Potassium 4 3  3 5 - 5 3 mmol/L Final    Chloride 102  100 - 108 mmol/L Final    CO2 26  21 - 32 mmol/L Final    Anion Gap 12  4 - 13 mmol/L Final    BUN 22  5 - 25 mg/dL Final    Creatinine 1 29  0 60 - 1 30 mg/dL Final    Comment: Standardized to IDMS reference method    Glucose 137  65 - 140 mg/dL Final    Comment:   If the patient is fasting, the ADA then defines impaired fasting glucose as > 100 mg/dL and diabetes as > or equal to 123 mg/dL  Specimen collection should occur prior to Sulfasalazine administration due to the potential for falsely depressed results  Specimen collection should occur prior to Sulfapyridine administration due to the potential for falsely elevated results      Calcium 9 9  8 3 - 10 1 mg/dL Final    ALT 29  12 - 78 U/L Final    Comment:   Specimen collection should occur prior to Sulfasalazine administration due to the potential for falsely depressed results  Total Protein 6 8  6 4 - 8 2 g/dL Final    Total Bilirubin 0 70  0 20 - 1 00 mg/dL Final    eGFR 47  ml/min/1 73sq m Final    Narrative:     National Kidney Disease Education Program recommendations are as follows:  GFR calculation is accurate only with a steady state creatinine  Chronic Kidney disease less than 60 ml/min/1 73 sq  meters  Kidney failure less than 15 ml/min/1 73 sq  meters  LACTIC ACID, PLASMA - Abnormal     LACTIC ACID 2 4 (*) 0 5 - 2 0 mmol/L Final    Narrative:     Result may be elevated if tourniquet was used during collection  PROTIME-INR - Abnormal     Protime 16 0 (*) 12 1 - 14 4 seconds Final    INR 1 25 (*) 0 86 - 1 16 Final   APTT - Abnormal     PTT 44 (*) 23 - 35 seconds Final    Narrative: Therapeutic Heparin Range = 60-90 seconds   MANUAL DIFFERENTIAL(PHLEBS DO NOT ORDER) - Abnormal     Lymphocytes % 46 (*) 14 - 44 % Final    Monocytes % 2 (*) 4 - 12 % Final    Atypical Lymphocytes % 6 (*) <=0 % Final    Platelet Estimate Decreased (*) Adequate Final    Segmented % 45  43 - 75 % Final    Eosinophils % 1  0 - 6 % Final    Basophils % 0  0 - 1 % Final    Absolute Neutrophils 2 93  1 85 - 7 62 Thousand/uL Final    Lymphocytes Absolute 2 99  0 60 - 4 47 Thousand/uL Final    Monocytes Absolute 0 13  0 00 - 1 22 Thousand/uL Final    Eosinophils Absolute 0 07  0 00 - 0 40 Thousand/uL Final    Basophils Absolute 0 00  0 00 - 0 10 Thousand/uL Final    Total Counted 100   Final    Anisocytosis Present   Final   LACTIC ACID, PLASMA - Normal    LACTIC ACID 1 0  0 5 - 2 0 mmol/L Final    Narrative:     Result may be elevated if tourniquet was used during collection  BLOOD CULTURE   BLOOD CULTURE   UA W REFLEX TO MICROSCOPIC WITH REFLEX TO CULTURE       CT chest without contrast   Final Result   1    Right-sided patchy airspace opacities concerning for multilobar infiltrates  2   Incomplete evaluation of massive splenomegaly  3   Pectus excavatum deformity  ##[email or web address]##imslh         Workstation performed: [id number]             Procedures  Procedures      Phone Contacts  ED Phone Contact    ED Course  ED Course as of Oct 19 1555   Thu Oct 19, 2017   1405 Pending CT read and will admit for his bilateral PNA likely from chronic aspiration                                MDM  CritCare Time    Disposition  Final diagnoses:   Bilateral pneumonia     ED Disposition     ED Disposition Condition Comment    Admit  Case was discussed with SHEREE and the patient's admission status was agreed to be Admission Status: inpatient status to the service of Dr Stephen Ramsay  Follow-up Information    None       Patient's Medications   Discharge Prescriptions    No medications on file     No discharge procedures on file      ED Provider  Electronically Signed by       Adelso Tubbs DO  10/19/17 4966

## 2017-10-19 NOTE — CONSULTS
Consultation - Pulmonary Medicine   Tom Rojo 80 y o  male MRN: [id number]  Unit/Bed#: ED 28 Encounter: [id number]      Assessment:  1  Pneumonia - HCAP vs aspiration    Plan:   1  Pneumonia - HCAP vs aspiration  - CT scan done shows RML and RLL patchy airspace opacities worrysome for infiltrates  Mild ground glass opacity RUL  - Patient had an aspiration pneumonia back in February, was treated with 7 days of antibiotics  At that time he was deemed high risk for aspiration for all consistencies but the family insisted on him taking PO despite the risk  So more likely to be aspiration related  - He will be put on cefepime and flagyl to cover for aspiration and HCAP given he lives in assisted living  Would also add Vanc and do MRSA swab, can stop if negative  Was started on Levaquin as outpatient but did not improve  - He is febrile, no leukocytosis, did have elevated lactic but that has cleared with some IV fluids  Not requiring any oxygen, sats are mid 90s on room air  - Will give ipratropium nebs to help with mucous clearance, add chest PT  No mucinex until he is seen by speech therapy for appropriate PO recommendations - suspect he will still be deemed unsafe for any PO  - Will need follow up imaging in 6-8 weeks      History of Present Illness   Physician Requesting Consult: Neena Grace MD  Reason for Consult / Principal Problem: Pneumonia  Hx and PE limited by: Patient with demential, unable to give history  History obtained from chart and prior visit, son not currently at bedside  HPI: Lem Ganser is a 80y o  year old male nonsmoker with PMH of dementia, aspiration pneumonia in 2/2017, glaucoma, CLL who presents with complaint of fever, cough, fatigue and shortness of breath  He has been more lethargic and febrile  He was started on Levaquin about 3 days ago with no improvement  He has still been taking PO despite high risk for aspiration on his last hospitalization       Inpatient consult to Pulmonology  Consult performed by: Alon Hurst  Consult ordered by: Rosangela Mosqueda          Review of Systems   Unable to perform ROS: Dementia       Historical Information   Past Medical History:   Diagnosis Date    Dementia     Dermatitis     Edema     Glaucoma     Hyperlipidemia     Neuropathy     Vitamin B 12 deficiency      History reviewed  No pertinent surgical history  Social History   History   Alcohol Use    Yes     Comment: occasional     History   Drug Use     History   Smoking Status    Never Smoker   Smokeless Tobacco    Never Used     Occupational History: Noncontributory    Family History: non-contributory    Meds/Allergies   all current active meds have been reviewed and pertinent pulmonary meds have been reviewed    No Known Allergies    Objective   Vitals: Blood pressure 156/67, pulse 83, temperature (!) 100 6 °F (38 1 °C), temperature source Rectal, resp  rate (!) 30, height 6' 2" (1 88 m), weight 80 7 kg (178 lb), SpO2 95 %  ,Body mass index is 22 85 kg/m²  Intake/Output Summary (Last 24 hours) at 10/19/17 1639  Last data filed at 10/19/17 1522   Gross per 24 hour   Intake             1050 ml   Output                0 ml   Net             1050 ml     Invasive Devices     Peripheral Intravenous Line            Peripheral IV 10/19/17 Left Forearm less than 1 day    Peripheral IV 10/19/17 Right Forearm less than 1 day                Physical Exam   Constitutional: No distress  Neck: Normal range of motion  Neck supple  Cardiovascular: Normal rate and regular rhythm  Pulmonary/Chest: Effort normal  He has no decreased breath sounds  He has no wheezes  He has rhonchi in the right upper field, the right middle field, the right lower field, the left upper field, the left middle field and the left lower field  He has no rales  Musculoskeletal: He exhibits no edema  Neurological: He is alert  Vitals reviewed        Lab Results:   I have personally reviewed pertinent lab results  , CBC:   Lab Results   Component Value Date    WBC 6 50 10/19/2017    HGB 12 5 10/19/2017    HCT 38 1 10/19/2017    MCV 95 10/19/2017    PLT 96 (L) 10/19/2017    MCH 31 2 10/19/2017    MCHC 32 8 10/19/2017    RDW 12 6 10/19/2017    MPV 10 1 10/19/2017    NRBC 0 10/19/2017   , CMP:   Lab Results   Component Value Date     10/19/2017    K 4 3 10/19/2017     10/19/2017    CO2 26 10/19/2017    ANIONGAP 12 10/19/2017    BUN 22 10/19/2017    CREATININE 1 29 10/19/2017    GLUCOSE 137 10/19/2017    CALCIUM 9 9 10/19/2017     (H) 10/19/2017    ALT 29 10/19/2017    ALKPHOS 117 (H) 10/19/2017    PROT 6 8 10/19/2017    ALBUMIN 3 4 (L) 10/19/2017    BILITOT 0 70 10/19/2017    EGFR 47 10/19/2017     Imaging Studies: I have personally reviewed pertinent reports  and I have personally reviewed pertinent films in PACS  EKG, Pathology, and Other Studies: I have personally reviewed pertinent reports      VTE Prophylaxis: Sequential compression device Ubaldogiovanni Last)     Code Status: Prior  Advance Directive and Living Will:      Power of :    POLST:

## 2017-10-19 NOTE — H&P
History and Physical - [facility] Internal Medicine    Patient Information: Lilian Mooney 80 y o  male MRN: [id number]  Unit/Bed#: ED 29 Encounter: [id number]  Admitting Physician: Matt Manzanares MD  PCP: John Garcia DO  Date of Admission:  10/19/17    Assessment/Plan:    Hospital Problem List:     Principal Problem:    Pneumonia  Active Problems:    Lactic acidosis    Dementia    Glaucoma    Personal history of CLL (chronic lymphocytic leukemia)      Plan for the Primary Problem(s):  · Pneumonia  · Probable aspiration pneumonia  I will put the patient on cefepime and Flagyl and monitor  We will have speech therapy evaluate the patient    Plan for Additional Problems:   · Lactic acidosis: This is likely secondary to the pneumonia  Put the patient on some IV fluids but be careful not to volume overload the patient  Will check another lactic acid level  · Dementia:  The patient is on Aricept and Namenda  · History of CLL:  The patient does have splenomegaly on the CT scan  This is stable  · Glaucoma:  Continue the eyedrops    VTE Prophylaxis: Enoxaparin (Lovenox)  / sequential compression device   Code Status:  DNR level 3  POLST: POLST is not applicable to this patient    Anticipated Length of Stay:  Patient will be admitted on an Inpatient basis with an anticipated length of stay of  greater than 2 midnights  Justification for Hospital Stay:  Patient will greater than 2 midnights secondary to having aspiration pneumonia needing IV antibiotics as well as pulmonology and speech therapy evaluations    Total Time for Visit, including Counseling / Coordination of Care: 1 hour  Greater than 50% of this total time spent on direct patient counseling and coordination of care  Chief Complaint:   Weakness and shortness of breath    History of Present Illness:    Lilian Mooney is a 80 y o  male who presents with weakness and short of breath    Unfortunately the patient is a great historian secondary to his dementia but according to the son at the bedside the son got a call from the assisted living facility stating that his dad was lethargic and had some fevers  He has also been having a cough  Patient is known to aspirate and the last time he aspiration pneumonia was in February after that he went to a skilled nursing facility but he has been at his assisted-living facility since then       Review of Systems:    Review of Systems   Constitutional: Positive for activity change, appetite change, fatigue and fever  Respiratory: Positive for cough and shortness of breath  Neurological: Positive for weakness  All other systems reviewed and are negative  Past Medical and Surgical History:     Past Medical History:   Diagnosis Date    Dementia     Dermatitis     Edema     Glaucoma     Hyperlipidemia     Neuropathy     Vitamin B 12 deficiency        History reviewed  No pertinent surgical history  Meds/Allergies:    Prior to Admission medications    Medication Sig Start Date End Date Taking?  Authorizing Provider   acetaminophen (TYLENOL) 325 mg tablet Take 2 tablets by mouth every 6 (six) hours as needed for mild pain, moderate pain, headaches or fever 2/14/17   Estrella Bound, DO   aspirin 81 mg chewable tablet Chew 81 mg daily    Historical Provider, MD   bimatoprost (LUMIGAN) 0 01 % ophthalmic drops Administer 1 drop to both eyes daily at bedtime    Historical Provider, MD   cholecalciferol (VITAMIN D3) 1,000 units tablet Take 2 tablets by mouth daily 2/14/17   Estrella Bound, DO   cyanocobalamin 250 MCG tablet Take 1 tablet by mouth daily 2/15/17   Estrella Bound, DO   donepezil (ARICEPT) 10 mg tablet Take 10 mg by mouth daily at bedtime    Historical Provider, MD   dorzolamide (TRUSOPT) 2 % ophthalmic solution Administer 1 drop to both eyes 2 (two) times a day    Historical Provider, MD   Heparin Sodium, Porcine, (HEPARIN, PORCINE,) 5,000 units/mL Inject 1 mL under the skin every 8 (eight) hours 2/14/17   Maicol Talavera DO   hydrocortisone 1 % cream Apply 1 application topically 4 (four) times a day as needed for irritation or rash 2/14/17   Maicol Talavera DO   Memantine HCl ER (NAMENDA XR) 28 MG CP24 Take 1 capsule by mouth daily    Historical Provider, MD   Multiple Vitamin (DAILY ROHIT PO) Take 1 tablet by mouth daily    Historical Provider, MD   Multiple Vitamins-Minerals (PRESERVISION/LUTEIN PO) Take 1 tablet by mouth daily    Historical Provider, MD   nystatin (MYCOSTATIN) powder Apply 1 application topically 2 (two) times a day    Historical Provider, MD     I have reviewed home medications with patient personally  Allergies: No Known Allergies    Social History:     Marital Status:     Occupation:  Retired  Patient Pre-hospital Living Situation:  Is at assisted living  Patient Pre-hospital Level of Mobility:  Independent  Patient Pre-hospital Diet Restrictions:  None  Substance Use History:   History   Alcohol Use    Yes     Comment: occasional     History   Smoking Status    Never Smoker   Smokeless Tobacco    Never Used     History   Drug Use       Family History:    Family History   Problem Relation Age of Onset    No Known Problems Mother     No Known Problems Father        Physical Exam:     Vitals:   Blood Pressure: 156/67 (10/19/17 1504)  Pulse: 83 (10/19/17 1504)  Temperature: (!) 100 6 °F (38 1 °C) (10/19/17 1311)  Temp Source: Rectal (10/19/17 1311)  Respirations: (!) 30 (10/19/17 1504)  Height: 6' 2" (188 cm) (10/19/17 1328)  Weight - Scale: 80 7 kg (178 lb) (10/19/17 1327)  SpO2: 95 % (10/19/17 1504)    Physical Exam    (   General Appearance:    Alert, cooperative, no distress, appears stated age   Head:    Normocephalic, without obvious abnormality, atraumatic   Eyes:    PERRL, conjunctiva/corneas clear, EOM's intact,             Nose:   Nares normal, septum midline, mucosa normal   Throat:   Lips, mucosa, and tongue normal; teeth and gums normal   Neck:   Supple, symmetrical, no adenopathy;        thyroid:  No enlargement/tenderness/nodules; no carotid    bruit or JVD   Back:     Symmetric, no curvature, ROM normal, no CVA tenderness   Lungs:     Diffuse rhonchi       Heart:    Regular rate and rhythm, S1 and S2 normal, no murmur, rub    or gallop   Abdomen:     Soft, non-tender, bowel sounds active all four quadrants,     no masses, no organomegaly           Extremities:   Extremities normal, atraumatic, no cyanosis or edema   Pulses:   2+ and symmetric all extremities   Skin:   Skin color, texture, turgor normal, no rashes or lesions   Lymph nodes:   Cervical, supraclavicular, and axillary nodes normal   Neurologic:   CNII-XII intact  Normal strength, sensation and reflexes       throughout    Be Sure to Include Physical Exam: Delete this entire line when you have entered your exam)    Additional Data:     Lab Results: I have personally reviewed pertinent reports  Results from last 7 days  Lab Units 10/19/17  1308   WBC Thousand/uL 6 50   HEMOGLOBIN g/dL 12 5   HEMATOCRIT % 38 1   PLATELETS Thousands/uL 96*   LYMPHO PCT % 46*   MONO PCT MAN % 2*   EOSINO PCT MANUAL % 1       Results from last 7 days  Lab Units 10/19/17  1308   SODIUM mmol/L 140   POTASSIUM mmol/L 4 3   CHLORIDE mmol/L 102   CO2 mmol/L 26   BUN mg/dL 22   CREATININE mg/dL 1 29   CALCIUM mg/dL 9 9   TOTAL PROTEIN g/dL 6 8   BILIRUBIN TOTAL mg/dL 0 70   ALK PHOS U/L 117*   ALT U/L 29   AST U/L 146*   GLUCOSE RANDOM mg/dL 137       Results from last 7 days  Lab Units 10/19/17  1308   INR  1 25*       Imaging: I have personally reviewed pertinent reports  Ct Chest Without Contrast    Result Date: 10/19/2017  Narrative: CT CHEST WITHOUT IV CONTRAST INDICATION:  Pneumonia  COMPARISON: 8/26/2015 TECHNIQUE: CT examination of the chest was performed without intravenous contrast   Reformatted images were created in axial, sagittal, and coronal planes      Radiation dose length product (DLP) for this visit: 274 mGy-cm   This examination, like all CT scans performed in the [facility], was performed utilizing techniques to minimize radiation dose exposure, including the use of iterative reconstruction and automated exposure control  FINDINGS: LUNGS:  Right middle and right lower lobe patchy airspace opacities worrisome for pulmonary infiltrates  Mild groundglass opacity right upper lobe also concerning for infiltrate  PLEURA:  Unremarkable  HEART/GREAT VESSELS:  Unremarkable for patient's age  MEDIASTINUM AND FLAVIO:  Unremarkable  CHEST WALL AND LOWER NECK: Prominent pectus excavatum deformity noted  VISUALIZED STRUCTURES IN THE UPPER ABDOMEN:  Incompletely evaluated splenomegaly  OSSEOUS STRUCTURES:  No acute fracture  No destructive osseous lesion  Impression: 1  Right-sided patchy airspace opacities concerning for multilobar infiltrates  2   Incomplete evaluation of massive splenomegaly  3   Pectus excavatum deformity  ##imslh##imslh Workstation performed: [id number]       EKG, Pathology, and Other Studies Reviewed on Admission:   · CT scan reviewed    Allscripts / Epic Records Reviewed: Yes     ** Please Note: This note has been constructed using a voice recognition system   **

## 2017-10-20 LAB
ALBUMIN SERPL BCP-MCNC: 2.6 G/DL (ref 3.5–5)
ALP SERPL-CCNC: 89 U/L (ref 46–116)
ALT SERPL W P-5'-P-CCNC: 21 U/L (ref 12–78)
ANION GAP SERPL CALCULATED.3IONS-SCNC: 9 MMOL/L (ref 4–13)
AST SERPL W P-5'-P-CCNC: 81 U/L (ref 5–45)
ATRIAL RATE: 91 BPM
BILIRUB SERPL-MCNC: 0.9 MG/DL (ref 0.2–1)
BUN SERPL-MCNC: 20 MG/DL (ref 5–25)
CALCIUM SERPL-MCNC: 8.8 MG/DL (ref 8.3–10.1)
CHLORIDE SERPL-SCNC: 107 MMOL/L (ref 100–108)
CO2 SERPL-SCNC: 26 MMOL/L (ref 21–32)
CREAT SERPL-MCNC: 1.28 MG/DL (ref 0.6–1.3)
ERYTHROCYTE [DISTWIDTH] IN BLOOD BY AUTOMATED COUNT: 12.8 % (ref 11.6–15.1)
GFR SERPL CREATININE-BSD FRML MDRD: 48 ML/MIN/1.73SQ M
GLUCOSE SERPL-MCNC: 125 MG/DL (ref 65–140)
HCT VFR BLD AUTO: 35.1 % (ref 36.5–49.3)
HGB BLD-MCNC: 11.6 G/DL (ref 12–17)
L PNEUMO1 AG UR QL IA.RAPID: NEGATIVE
MCH RBC QN AUTO: 31.6 PG (ref 26.8–34.3)
MCHC RBC AUTO-ENTMCNC: 33 G/DL (ref 31.4–37.4)
MCV RBC AUTO: 96 FL (ref 82–98)
P AXIS: 7 DEGREES
PHOSPHATE SERPL-MCNC: 3.4 MG/DL (ref 2.3–4.1)
PLATELET # BLD AUTO: 57 THOUSANDS/UL (ref 149–390)
PMV BLD AUTO: 10.3 FL (ref 8.9–12.7)
POTASSIUM SERPL-SCNC: 3.9 MMOL/L (ref 3.5–5.3)
PR INTERVAL: 210 MS
PROT SERPL-MCNC: 5.4 G/DL (ref 6.4–8.2)
QRS AXIS: 3 DEGREES
QRSD INTERVAL: 78 MS
QT INTERVAL: 336 MS
QTC INTERVAL: 413 MS
RBC # BLD AUTO: 3.67 MILLION/UL (ref 3.88–5.62)
S PNEUM AG UR QL: NEGATIVE
SODIUM SERPL-SCNC: 142 MMOL/L (ref 136–145)
T WAVE AXIS: 22 DEGREES
VENTRICULAR RATE: 91 BPM
WBC # BLD AUTO: 5.29 THOUSAND/UL (ref 4.31–10.16)

## 2017-10-20 PROCEDURE — 92610 EVALUATE SWALLOWING FUNCTION: CPT

## 2017-10-20 PROCEDURE — 84100 ASSAY OF PHOSPHORUS: CPT | Performed by: FAMILY MEDICINE

## 2017-10-20 PROCEDURE — 97167 OT EVAL HIGH COMPLEX 60 MIN: CPT

## 2017-10-20 PROCEDURE — 87205 SMEAR GRAM STAIN: CPT | Performed by: FAMILY MEDICINE

## 2017-10-20 PROCEDURE — 86022 PLATELET ANTIBODIES: CPT | Performed by: FAMILY MEDICINE

## 2017-10-20 PROCEDURE — 94640 AIRWAY INHALATION TREATMENT: CPT

## 2017-10-20 PROCEDURE — 80053 COMPREHEN METABOLIC PANEL: CPT | Performed by: FAMILY MEDICINE

## 2017-10-20 PROCEDURE — G8979 MOBILITY GOAL STATUS: HCPCS

## 2017-10-20 PROCEDURE — G8988 SELF CARE GOAL STATUS: HCPCS

## 2017-10-20 PROCEDURE — 87070 CULTURE OTHR SPECIMN AEROBIC: CPT | Performed by: FAMILY MEDICINE

## 2017-10-20 PROCEDURE — G8987 SELF CARE CURRENT STATUS: HCPCS

## 2017-10-20 PROCEDURE — 97535 SELF CARE MNGMENT TRAINING: CPT

## 2017-10-20 PROCEDURE — 94760 N-INVAS EAR/PLS OXIMETRY 1: CPT

## 2017-10-20 PROCEDURE — 97163 PT EVAL HIGH COMPLEX 45 MIN: CPT

## 2017-10-20 PROCEDURE — 85027 COMPLETE CBC AUTOMATED: CPT | Performed by: FAMILY MEDICINE

## 2017-10-20 PROCEDURE — G8978 MOBILITY CURRENT STATUS: HCPCS

## 2017-10-20 RX ORDER — FUROSEMIDE 20 MG/1
20 TABLET ORAL DAILY
COMMUNITY
End: 2017-10-30 | Stop reason: HOSPADM

## 2017-10-20 RX ORDER — GUAIFENESIN 600 MG
600 TABLET, EXTENDED RELEASE 12 HR ORAL EVERY 12 HOURS SCHEDULED
Status: DISCONTINUED | OUTPATIENT
Start: 2017-10-20 | End: 2017-10-30 | Stop reason: HOSPADM

## 2017-10-20 RX ORDER — METRONIDAZOLE 500 MG/1
500 TABLET ORAL EVERY 8 HOURS SCHEDULED
Status: DISCONTINUED | OUTPATIENT
Start: 2017-10-20 | End: 2017-10-30

## 2017-10-20 RX ADMIN — CHOLECALCIFEROL TAB 25 MCG (1000 UNIT) 2000 UNITS: 25 TAB at 09:26

## 2017-10-20 RX ADMIN — ASPIRIN 81 MG 81 MG: 81 TABLET ORAL at 09:26

## 2017-10-20 RX ADMIN — METRONIDAZOLE 500 MG: 500 TABLET ORAL at 21:07

## 2017-10-20 RX ADMIN — SODIUM CHLORIDE 75 ML/HR: 0.9 INJECTION, SOLUTION INTRAVENOUS at 21:08

## 2017-10-20 RX ADMIN — DORZOLAMIDE HYDROCHLORIDE 1 DROP: 20 SOLUTION/ DROPS OPHTHALMIC at 09:27

## 2017-10-20 RX ADMIN — CEFEPIME HYDROCHLORIDE 2000 MG: 2 INJECTION, POWDER, FOR SOLUTION INTRAVENOUS at 09:34

## 2017-10-20 RX ADMIN — IPRATROPIUM BROMIDE 0.5 MG: 0.5 SOLUTION RESPIRATORY (INHALATION) at 20:22

## 2017-10-20 RX ADMIN — ENOXAPARIN SODIUM 40 MG: 40 INJECTION SUBCUTANEOUS at 09:25

## 2017-10-20 RX ADMIN — MEMANTINE HYDROCHLORIDE 10 MG: 5 TABLET ORAL at 09:27

## 2017-10-20 RX ADMIN — MEMANTINE HYDROCHLORIDE 10 MG: 5 TABLET ORAL at 18:32

## 2017-10-20 RX ADMIN — METRONIDAZOLE 500 MG: 500 INJECTION, SOLUTION INTRAVENOUS at 05:32

## 2017-10-20 RX ADMIN — IPRATROPIUM BROMIDE 0.5 MG: 0.5 SOLUTION RESPIRATORY (INHALATION) at 08:10

## 2017-10-20 RX ADMIN — CEFEPIME HYDROCHLORIDE 2000 MG: 2 INJECTION, POWDER, FOR SOLUTION INTRAVENOUS at 21:08

## 2017-10-20 RX ADMIN — IPRATROPIUM BROMIDE 0.5 MG: 0.5 SOLUTION RESPIRATORY (INHALATION) at 14:00

## 2017-10-20 RX ADMIN — DORZOLAMIDE HYDROCHLORIDE 1 DROP: 20 SOLUTION/ DROPS OPHTHALMIC at 18:30

## 2017-10-20 RX ADMIN — BIMATOPROST 1 DROP: 0.1 SOLUTION/ DROPS OPHTHALMIC at 21:09

## 2017-10-20 RX ADMIN — CYANOCOBALAMIN TAB 500 MCG 250 MCG: 500 TAB at 09:26

## 2017-10-20 RX ADMIN — GUAIFENESIN 600 MG: 600 TABLET, EXTENDED RELEASE ORAL at 21:07

## 2017-10-20 RX ADMIN — METRONIDAZOLE 500 MG: 500 INJECTION, SOLUTION INTRAVENOUS at 13:42

## 2017-10-20 RX ADMIN — DONEPEZIL HYDROCHLORIDE 10 MG: 5 TABLET ORAL at 21:07

## 2017-10-20 RX ADMIN — GUAIFENESIN 600 MG: 600 TABLET, EXTENDED RELEASE ORAL at 14:32

## 2017-10-20 RX ADMIN — VANCOMYCIN HYDROCHLORIDE 1250 MG: 5 INJECTION, POWDER, LYOPHILIZED, FOR SOLUTION INTRAVENOUS at 15:18

## 2017-10-20 NOTE — PLAN OF CARE
Problem: OCCUPATIONAL THERAPY ADULT  Goal: Performs self-care activities at highest level of function for planned discharge setting  See evaluation for individualized goals  Treatment Interventions: ADL retraining, Functional transfer training, UE strengthening/ROM, Endurance training, Cognitive reorientation, Patient/family training, Equipment evaluation/education, Fine motor coordination activities, Compensatory technique education, Continued evaluation, Cardiac education, Energy conservation, Activityengagement          See flowsheet documentation for full assessment, interventions and recommendations  Limitation: Decreased ADL status, Decreased UE ROM, Decreased UE strength, Decreased Safe judgement during ADL, Decreased cognition, Decreased endurance, Decreased fine motor control, Decreased self-care trans, Decreased high-level ADLs  Prognosis: Good  Assessment: Patient is a 80 y o  male seen for OT evaluation s/p admit to [address] [facility] on 10/19/2017 w/Pneumonia  Commorbidities affecting patient's functional performance at time of assessment include: lactic acidosis, dementia, glaucoma, personal h/o CLL  Pt is poor historian 2* baseline dementia  Prior to admission, Patient resided at [address] [place] [address], [address] , [facility]? Bridget Mejia No further history obtained at this time, pending further information from Facility/ Family members  Personal factors affecting patient at time of initial evaluation include: limited insight into deficits, decreased initiation and engagement and difficulty performing ADLs  Upon evaluation, patient requires moderate assist for UB ADLs, maximal and total assist for LB ADLs, transfers with moderate assist, with the use of Rolling Walker   Occupational performance is affected by the following deficits: orientation, attention span, decreased functional use of BUEs, decreased muscle strength, impaired gross motor coordination, impaired fine motor coordination, dynamic sit/ stand balance deficit with poor standing tolerance time for self care and functional mobility, decreased activity tolerance, impaired memory, decreased safety awareness, poor trunk control and postural control and postural alignment deficit, requiring external assistance to complete transitional movements  Therapist completed  extensive additional review of medical records and additional review of physical, cognitive or psychosocial history, clinical examination identifying 5 or more performance deficits, clinical decision making of a high complexity , consistent with a high complexity level evaluation  Patient to benefit from continued Occupational Therapy treatment while in the hospital to address deficits as defined above and maximize level of functional independence with ADLs and functional mobility  Occupational Performance areas to address include: eating, grooming , bathing/ shower, dressing, toilet hygiene, transfer to all surfaces, functional ambulation, functional mobility, functional communication, Leisure Participation and Social participation  From OT standpoint, recommendation at time of d/c would be Short Term Rehab         OT Discharge Recommendation: Short Term Rehab  OT - OK to Discharge: Yes (STR when medically cleared)

## 2017-10-20 NOTE — PHYSICAL THERAPY NOTE
Physical Therapy Evaluation    Patient's Name: Maria De Jesus Chaves    Admitting Diagnosis  Healthcare-associated pneumonia [J18 9]  Fever [R50 9]  Bilateral pneumonia [J18 9]    Problem List  Patient Active Problem List   Diagnosis    Healthcare-associated pneumonia    Hypoxia    Generalized weakness    Acute respiratory insufficiency    Hypokalemia    Anemia    Leukopenia    Dysphagia    Hyperlipidemia    Dementia    Acute sinusitis    Hypophosphatemia    Vitamin D deficiency    Cerebral atrophy    Cerebellar atrophy    Pneumonia    Lactic acidosis    Dementia    Glaucoma    Personal history of CLL (chronic lymphocytic leukemia)       Past Medical History  Past Medical History:   Diagnosis Date    Dementia     Dermatitis     Edema     Glaucoma     Hyperlipidemia     Neuropathy     Vitamin B 12 deficiency        Past Surgical History  History reviewed  No pertinent surgical history  10/20/17 4622   Note Type   Note type Eval/Treat   Pain Assessment   Pain Assessment No/denies pain   Pain Score No Pain   Home Living   Type of Home Assisted living  [facility])   Home Layout One level   Home Equipment Nicole Uribe  (pt reports he uses RW for mobility)   Additional Comments Pt is poor historian 2* baseline dementia  PT discussed such w/ DYAN Sanders who reports she placed a call to [facility] for information regarding pt's functional status  PT will follow up w/ CM once information is obtained  Prior Function   Lives With Facility staff   Falls in the last 6 months (unknown)   Restrictions/Precautions   Weight Bearing Precautions Per Order No   Braces or Orthoses (none per pt's chart)   Other Precautions Contact/isolation;Aspiration; Fall Risk;Multiple lines;Hard of hearing;Visual impairment; Chair Alarm; Bed Alarm;Cognitive   General   Family/Caregiver Present No   Cognition   Overall Cognitive Status Impaired   Arousal/Participation Cooperative  (initially somnolent, alert by end of session) Orientation Level Oriented to person;Disoriented to place; Disoriented to time;Disoriented to situation   Memory Decreased recall of biographical information;Decreased short term memory;Decreased recall of recent events   Following Commands Follows one step commands with increased time or repetition   RUE Assessment   RUE Assessment WFL   LUE Assessment   LUE Assessment WFL   RLE Assessment   RLE Assessment WFL  (MMT grossly 4/5 throughout)   LLE Assessment   LLE Assessment WFL  (MMT grossly 4/5 throughout)   Coordination   Movements are Fluid and Coordinated 1   Sensation (unable to formally assess 2* baseline dementia)   Bed Mobility   Rolling L 4  Minimal assistance   Additional items Assist x 1;Bedrails; Increased time required;Verbal cues   Supine to Sit 3  Moderate assistance   Additional items Assist x 1;HOB elevated; Bedrails; Increased time required;Verbal cues;LE management   Transfers   Sit to Stand 3  Moderate assistance   Additional items Assist x 2; Increased time required;Verbal cues  (w/ RW; posterior LOB requiring mod Ax2)   Stand to Sit 3  Moderate assistance   Additional items Assist x 2;Armrests; Verbal cues   Ambulation/Elevation   Gait pattern Decreased foot clearance;Shuffling; Wide ZHOU;Retropulsion; Short stride; Excessively slow   Gait Assistance 3  Moderate assist   Additional items Assist x 2;Verbal cues; Tactile cues   Assistive Device Rolling walker   Distance 5' from bed>recliner   Balance   Static Sitting Fair +   Dynamic Sitting Fair   Static Standing Poor +   Dynamic Standing Poor   Ambulatory Poor   Endurance Deficit   Endurance Deficit Yes   Activity Tolerance   Activity Tolerance Patient limited by fatigue;Treatment limited secondary to medical complications (Comment)  (baseline dementia)   Medical Staff Made Aware pt w/ up w/ A order; PT discussed outcomes of eval w/ CM Hodan Rodgers- reporting she placed a call to [employer] to obtain info on pt's functional mobility and PLOF status   Nurse Made Aware Yes, GRACIE Mcgregor verbalized pt appropriate for PT session, made aware of outcomes   Assessment   Prognosis Good   Problem List Decreased strength;Decreased endurance; Impaired balance;Decreased mobility; Decreased cognition;Decreased safety awareness; Impaired vision; Impaired hearing   Assessment Pt is 80 y o  male seen for PT evaluation on 10/20/2017 s/p admit to [facility] on 10/19/2017 w/ Pneumonia; pt presented to ED w/ weakness and SOB  PT consulted to assess pt's functional mobility and d/c needs  Order placed for PT eval and tx, w/ up w/ A order  Comorbidities affecting pt's physical performance at time of assessment include: lactic acidosis, dementia, glaucoma, personal h/o CLL  Pt is poor historian 2* baseline dementia  PT discussed such w/ CM Kenneth Lopez) who reports she placed a call to [facility] for information regarding pt's functional status  PT will follow up w/ CM once information is obtained  PTA, pt was residing at [address] [place] [address]  Pt reporting he amb'ed w/ RW while there (? reliability 2* baseline dementia)  Personal factors affecting pt at time of IE include: inability to navigate level surfaces w/o external assistance, decreased cognition, hearing impairments, visual impairments, decreased initiation and engagement, limited insight into impairments, inability to perform IADLs and inability to perform ADLs  Please find objective findings from PT assessment regarding body systems outlined above with impairments and limitations including weakness, impaired balance, decreased endurance, decreased activity tolerance, decreased functional mobility tolerance, decreased safety awareness, fall risk and decreased cognition, as well as mobility assessment (need for cueing for mobility technique)  The following objective measures performed on IE also reveal limitations: Barthel Index: 20/100 and Modified Gentry: 4 (moderate/severe disability)   Pt's clinical presentation is currently unstable/unpredictable seen in pt's presentation of need for input for task focus and mobility technique and baseline dementia  Pt to benefit from continued PT tx to address deficits as defined above and maximize level of functional independent mobility and consistency  From PT/mobility standpoint, recommendation at time of d/c would be STR pending progress in order to facilitate return to PLOF  Barriers to Discharge (unknown how much A pt was receiving PTA)   Goals   Patient Goals none expressed   STG Expiration Date 10/30/17   Short Term Goal #1 In 7-10 days: Increase bilateral LE strength 1/2 grade to facilitate independent mobility, Perform all bed mobility tasks with SBA to decrease caregiver burden, Perform all transfers with SBA to improve independence, Ambulate > 50 ft  with least restrictive assistive device with SBA w/o LOB and w/ normalized gait pattern 100% of the time, Increase all balance 1/2 grade to decrease risk for falls and Tolerate 4 hr OOB to faciliate upright tolerance   Treatment Day 0   Plan   Treatment/Interventions Functional transfer training;LE strengthening/ROM; Therapeutic exercise; Endurance training;Cognitive reorientation;Patient/family training;Equipment eval/education; Bed mobility;Gait training;Continued evaluation;Spoke to nursing;Spoke to case management;OT   PT Frequency (3-5x/wk)   Recommendation   Recommendation Short-term skilled PT  (PT to follow up w/ CM)   Equipment Recommended Walker  (RW at this time)   PT - OK to Discharge No   Modified Mary Alice Scale   Modified Melrose Scale 4   Barthel Index   Feeding 5   Bathing 0   Grooming Score 0   Dressing Score 0   Bladder Score 0   Bowels Score 5   Toilet Use Score 5   Transfers (Bed/Chair) Score 5   Mobility (Level Surface) Score 0   Stairs Score 0   Barthel Index Score 20         Rajendra Gee, PT

## 2017-10-20 NOTE — PLAN OF CARE
DISCHARGE PLANNING     Discharge to home or other facility with appropriate resources Progressing        DISCHARGE PLANNING - CARE MANAGEMENT     Discharge to post-acute care or home with appropriate resources Progressing        HEMATOLOGIC - ADULT     Maintains hematologic stability Progressing        Knowledge Deficit     Patient/family/caregiver demonstrates understanding of disease process, treatment plan, medications, and discharge instructions Progressing        MUSCULOSKELETAL - ADULT     Maintain or return mobility to safest level of function Progressing        NEUROSENSORY - ADULT     Achieves stable or improved neurological status Progressing        Nutrition/Hydration-ADULT     Nutrient/Hydration intake appropriate for improving, restoring or maintaining nutritional needs Progressing        Potential for Falls     Patient will remain free of falls Progressing        Prexisting or High Potential for Compromised Skin Integrity     Skin integrity is maintained or improved Progressing        RESPIRATORY - ADULT     Achieves optimal ventilation and oxygenation Progressing        SAFETY ADULT     Maintain or return to baseline ADL function Progressing     Maintain or return mobility status to optimal level Progressing        SKIN/TISSUE INTEGRITY - ADULT     Skin integrity remains intact Progressing     Incision(s), wounds(s) or drain site(s) healing without S/S of infection Progressing

## 2017-10-20 NOTE — RESPIRATORY THERAPY NOTE
RT Protocol Note  Júnior Hackett 80 y o  male MRN: [id number]  Unit/Bed#: -01 Encounter: [id number]    Assessment  Saw PT per protocol  No Sob at this time, only on excursion per PT, No distress at this time  PT states that he coughs up phlegm easily but gets hung up at the back of his throat  Strong cough  PT states he does not take resp meds at home but has albuterol inhaler on file  Principal Problem:    Pneumonia  Active Problems:    Lactic acidosis    Dementia    Glaucoma    Personal history of CLL (chronic lymphocytic leukemia)      Home Pulmonary Medications: Albuterol Inhaler    Past Medical History:   Diagnosis Date    Dementia     Dermatitis     Edema     Glaucoma     Hyperlipidemia     Neuropathy     Vitamin B 12 deficiency      Social History     Social History    Marital status:      Spouse name: N/A    Number of children: N/A    Years of education: N/A     Social History Main Topics    Smoking status: Never Smoker    Smokeless tobacco: Never Used    Alcohol use Yes      Comment: occasional    Drug use:     Sexual activity: Not Asked     Other Topics Concern    None     Social History Narrative    None       Subjective     Subjective Data: PT does not state any SOB  PT states he does not take any resp meds at home  Objective     Physical Exam:   Assessment Type: During-treatment  General Appearance: Alert, Awake  Respiratory Pattern: Normal, Dyspnea with exertion  Chest Assessment: Chest expansion symmetrical  Bilateral Breath Sounds: Diminished, Clear, Rhonchi  Cough: Congested, Strong  O2 Device: Room Air  Subjective Data: PT does not state any SOB  PT states he does not take any resp meds at home  Vitals:  Blood pressure 132/61, pulse 60, temperature 98 1 °F (36 7 °C), temperature source Axillary, resp  rate 18, height 6' 2" (1 88 m), weight 80 7 kg (178 lb), SpO2 97 %  Imaging and other studies: I have personally reviewed pertinent reports        O2 Device: Room Air     Plan     Respiratory Plan: No distress/Pulmonary history

## 2017-10-20 NOTE — OCCUPATIONAL THERAPY NOTE
Occupational Therapy Note        Patient Name: Tad Resendiz  MHSZU'D Date: 10/20/2017     10/20/17 1051   Note Type   Note type Eval/Treat   Restrictions/Precautions   Weight Bearing Precautions Per Order No   Braces or Orthoses Other (Comment)  (none per pt's chart)   Other Precautions Contact/isolation; Chair Alarm; Bed Alarm; Fall Risk;Cognitive;Multiple lines;Visual impairment   Pain Assessment   Pain Assessment No/denies pain   Pain Score No Pain   Home Living   Type of Home Assisted living; Other (Comment)  [facility]   Home Layout One level   [address] [place] [address],[address]   Prior Function   Level of [place] Needs assistance with ADLs and functional mobility   Lives With Facility staff   Falls in the last 6 months (unknown)   Lifestyle   Autonomy Pt is poor historian 2* baseline dementia  PT discussed such w/ CM Nadya Peterson) who reports she placed a call to [facility] for information regarding pt's functional status  PT will follow up w/ CM once information is obtained  Psychosocial   Psychosocial (WDL) WDL   ADL   Eating Assistance 5  Supervision/Setup   Grooming Assistance 4  Minimal Assistance   UB Bathing Assistance 2  Maximal Assistance   LB Bathing Assistance 1  Total Assistance   UB Dressing Assistance 3  Moderate Assistance   LB Dressing Assistance 1  Total Assistance   Toileting Assistance  2  Maximal Assistance   Bed Mobility   Supine to Sit 3  Moderate assistance   Additional items Assist x 2; Increased time required;Verbal cues;LE management   Transfers   Sit to Stand 3  Moderate assistance   Additional items Assist x 2;Bedrails; Increased time required;Verbal cues   Stand to Sit 3  Moderate assistance   Additional items Assist x 2; Increased time required;Armrests; Verbal cues   Functional Mobility   Functional Mobility 2  Maximal assistance   Balance   Static Sitting Fair +   Dynamic Sitting Fair   Static Standing Poor +   Dynamic Standing Poor   Ambulatory Poor   Activity Tolerance   Activity Tolerance Patient limited by fatigue;Treatment limited secondary to medical complications (Comment)  (baseline dementia)   Nurse Made Aware Yes, RN Meche verbalized pt appropriate for PT session, made aware of outcomes   RUE Assessment   RUE Assessment X   RUE Strength   RUE Overall Strength Deficits  (3+/5)   LUE Assessment   LUE Assessment X   LUE Strength   LUE Overall Strength Deficits  (3+/5)   Hand Function   Gross Motor Coordination Impaired   Fine Motor Coordination Impaired   Sensation   Light Touch No apparent deficits  (BUEs)   Vision-Basic Assessment   Current Vision Wears glasses all the time   Patient Visual Report (No significant changes reported)   Cognition   Overall Cognitive Status Impaired   Arousal/Participation Alert; Responsive   Attention Attends with cues to redirect   Orientation Level Oriented to person;Disoriented to place; Disoriented to time;Disoriented to situation  (Bilateral hearing aids)   Memory Decreased recall of biographical information;Decreased short term memory;Decreased recall of recent events   Following Commands Follows one step commands with increased time or repetition   Assessment   Limitation Decreased ADL status; Decreased UE ROM; Decreased UE strength;Decreased Safe judgement during ADL;Decreased cognition;Decreased endurance;Decreased fine motor control;Decreased self-care trans;Decreased high-level ADLs   Prognosis Good   Assessment Patient is a 80 y o  male seen for OT evaluation s/p admit to [address] [facility] on 10/19/2017 w/Pneumonia  Commorbidities affecting patient's functional performance at time of assessment include: lactic acidosis, dementia, glaucoma, personal h/o CLL  Pt is poor historian 2* baseline dementia  Prior to admission, Patient resided at [address] [facility] [address] Lynita Ped No further history obtained at this time, pending further information from Facility/ Family members    Personal factors affecting patient at time of initial evaluation include: limited insight into deficits, decreased initiation and engagement and difficulty performing ADLs  Upon evaluation, patient requires moderate assist for UB ADLs, maximal and total assist for LB ADLs, transfers with moderate assist, with the use of Rolling Walker  Occupational performance is affected by the following deficits: orientation, attention span, decreased functional use of BUEs, decreased muscle strength, impaired gross motor coordination, impaired fine motor coordination, dynamic sit/ stand balance deficit with poor standing tolerance time for self care and functional mobility, decreased activity tolerance, impaired memory, decreased safety awareness, poor trunk control and postural control and postural alignment deficit, requiring external assistance to complete transitional movements  Therapist completed  extensive additional review of medical records and additional review of physical, cognitive or psychosocial history, clinical examination identifying 5 or more performance deficits, clinical decision making of a high complexity , consistent with a high complexity level evaluation  Patient to benefit from continued Occupational Therapy treatment while in the hospital to address deficits as defined above and maximize level of functional independence with ADLs and functional mobility  Occupational Performance areas to address include: eating, grooming , bathing/ shower, dressing, toilet hygiene, transfer to all surfaces, functional ambulation, functional mobility, functional communication, Leisure Participation and Social participation  From OT standpoint, recommendation at time of d/c would be Short Term Rehab  Plan   Treatment Interventions ADL retraining;Functional transfer training;UE strengthening/ROM; Endurance training;Cognitive reorientation;Patient/family training;Equipment evaluation/education; Fine motor coordination activities; Compensatory technique education;Continued evaluation;Cardiac education; Energy conservation; Activityengagement   Goal Expiration Date 11/03/17   Treatment Day 1   OT Frequency 3-5x/wk   Additional Treatment Session   Start Time 1051   End Time 1108   Treatment Assessment Patient participated in Skilled OT session this date with interventions consisting of ADL re training with the use of correct body mechnaics, Energy Conservation techniques, Work simplification skills , safety awareness and fall prevention techniques, increase standing tolerance time with unilateral UE support to complete sink level ADLs, increase cardiovascular endurance , increase postural control and increase OOB/ sitting tolerance   Patient agreeable to OT treatment session, upon arrival patient was found seated OOB to Recliner  In comparison to previous session, patient with improvements in standing tolerance to ~ 2 minutes x 2 during toilet hygiene activities  Completed UB bathing with set up assist, mod assist for back area with good motor planning skills  Patient requiring verbal cues for safety, verbal cues for pacing thru activity steps and frequent rest periods  Patient continues to be functioning below baseline level, occupational performance remains limited secondary to factors listed above and increased risk for falls and injury  From OT standpoint, recommendation at time of d/c would be Short Term Rehab  Patient to benefit from continued Occupational Therapy treatment while in the hospital to address deficits as defined above and maximize level of functional independence with ADLs and functional mobility      Recommendation   OT Discharge Recommendation Short Term Rehab   OT - OK to Discharge Yes  (STR when medically cleared)   Barthel Index   Feeding 5   Bathing 0   Grooming Score 0   Dressing Score 0   Bladder Score 0   Bowels Score 5   Toilet Use Score 5   Transfers (Bed/Chair) Score 5   Mobility (Level Surface) Score 0   Stairs Score 0   Barthel Index Score 20   Modified [place] Scale Modified Mary Alice Scale 4     Occupational Therapy goals: In 7-14 days:     1- Patient will verbalize and demonstrate use of energy conservation/ deep breathing technique and work simplification skills during functional activity with no verbal cues  2-Patient will verbalize and demonstrate good body mechanics and joint protection techniques during  ADLs/ IADLs with no verbal cues  3- Patient will increase OOB/ sitting tolerance to 2-4 hours per day for increased participation in self care and leisure tasks with no s/s of exertion  4-Patient will increase standing tolerance time to 5  minutes with unilateral UE support to complete sink level ADLs@ mod I level   5- Patient will increase sitting tolerance at edge of bed to 20 minutes to complete UB ADLs @ set up assist level  6- Patient will transfer bed to Chair / toilet at Set up assist level with AD as indicated  7- Patient will complete UB ADLs with set up assist  8- Patient will complete LB ADLs with min assist with the use of adaptive equipment  9- Patient will complete toileting hygiene with set up assist/ supervision for thoroughness    10-Patient/ Family  will demonstrate competency with UE Home Exercise Program

## 2017-10-20 NOTE — PROGRESS NOTES
The metronidazole has / have been converted to Oral per [facility] IV-to-PO Auto-Conversion Protocol for Adults as approved by the Pharmacy and Therapeutics Committee  The patient met all eligible criteria:  3 Age = 25years old   2) Received at least one dose of the IV form   3) Receiving at least one other scheduled oral/enteral medication   4) Tolerating an oral/enteral diet   and did not have any exclusions:   1) Critical care patient   2) Active GI bleed (IF assessing H2RAs or PPIs)   3) Continuous tube feeding (IF assessing cipro, doxycycline, levofloxacin, minocycline, rifampin, or voriconazole)   4) Receiving PO vancomycin (IF assessing metronidazole)   5) Persistent nausea and/or vomiting   6) Ileus or gastrointestinal obstruction   7) Oscar/nasogastric tube set for continuous suction   8) Specific order not to automatically convert to PO (in the order's comments or if discussed in the most recent Infectious Disease or primary team's progress notes)

## 2017-10-20 NOTE — PROGRESS NOTES
Jaziel Balloon in attempts to confirm home medications  Left message  Will try again at a later time      =========================    Spoke to [employer] from [facility]   Requested pt information to be faxed    Tony Meehan RN  10/20/17  5:04 AM

## 2017-10-20 NOTE — SPEECH THERAPY NOTE
Speech-Language Pathology Bedside Swallow Evaluation        Patient Name: Cj Back    LKVGM'C Date: 10/20/2017     Problem List  Patient Active Problem List   Diagnosis    Healthcare-associated pneumonia    Hypoxia    Generalized weakness    Acute respiratory insufficiency    Hypokalemia    Anemia    Leukopenia    Dysphagia    Hyperlipidemia    Dementia    Acute sinusitis    Hypophosphatemia    Vitamin D deficiency    Cerebral atrophy    Cerebellar atrophy    Pneumonia    Lactic acidosis    Dementia    Glaucoma    Personal history of CLL (chronic lymphocytic leukemia)       Past Medical History  Past Medical History:   Diagnosis Date    Dementia     Dermatitis     Edema     Glaucoma     Hyperlipidemia     Neuropathy     Vitamin B 12 deficiency        Past Surgical History  History reviewed  No pertinent surgical history  Current Medical Status  Pt is a 80 y o  male resident of [place] who presented to [facility] with aspiration pneumonia  Pt is well known to this service from previous admission 6 months ago in which he also presented with aspiration; a video swallow study was performed 2/8/17 revealing silent aspiration of thin liquids and high risk of aspiration with all textures  After discussion with the family and the medical team, the patient and family elected not to place a PEG tube and to continue feeding by mouth  A Dysphagia 1/Puree and Nectar-Thick Liquid diet was recommended as the least restrictive diet  Imaging Studies:  CXR is pending  CT Chest 10/19 1  Right-sided patchy airspace opacities concerning for multilobar infiltrates  2   Incomplete evaluation of massive splenomegaly  3   Pectus excavatum deformity        Swallow Information   Current Risks for Dysphagia & Aspiration: known history of dysphagia, known history of aspiration and dementia     Current Symptoms/Concerns: Current aspiration PNA    Current Diet: puree/level 1 diet and nectar thick liquids      Baseline Diet: puree/level 1 diet and nectar thick liquids      Baseline Assessment   Behavior/Cognition: alert, oriented to person, cheerful, cooperative    Speech/Language Status: able to participate in conversation and able to follow commands    Patient Positioning: upright in chair       Swallow Mechanism Exam   Facial: symmetrical  Labial: decreased strength  Lingual: bilateral decreased strength and decreased coordination  Velum: symmetrical  Mandible: adequate ROM  Dentition: adequate  Vocal quality:clear/adequate   Volitional Cough: strong/productive although pt with constant cough at baseline  Swallow Mechanics: several incomplete/struggling hyolaryngeal elevations prior to eventual weak/delayed hyolaryngeal elevation      Consistencies Assessed and Performance   Consistencies Administered: nectar thick and puree    Oral Stage: mildly prolonged/weak oral manipulation, suspected premature spillage of liquids    Pharyngeal Stage: delayed and somewhat effortful hyolaryngeal elevation, +wet cough after almost all PO trials although difficult to distinguish from pt's baseline cough    Esophageal Concerns: none reported      Summary   Pt presents with moderate oral and severe pharyngeal dysphagia characterized by weak and prolonged oral manipulation and by s/sx aspiration with all textures  The pt's swallow function is likely unchanged or somewhat declined from his last admission 6 months ago  While keeping the patient NPO would be the safest option for preventing aspiration, the patient and his family have previously expressed a very strong preference to continue PO intake  A repeat video swallow study would not be useful as the results would be similar or worse than the last study, and would not change the plan of care for this patient       Risk for Aspiration: High    Recommendations: puree/level 1 diet and nectar thick liquids     Recommended Form of Meds: crushed with puree     Aspiration precautions and compensatory swallowing strategies: upright posture, only feed when fully alert, slow rate of feeding, small bites/sips and no straws    Results Reviewed with: patient, RN and MD         Speech Therapy Prognosis   Prognosis: deferred    Prognosis Considerations: prior medical history, cognitive status, progressive disease process and therapeutic potential      Plan  The safest option for this pt would be NPO  However, given that the patient and family have expressed a strong preference for continuing PO intake despite the risks of aspiration and PNA, recommend continuing Dysphagia 1/Puree and Nectar-Thick Liquid diet  ST will follow as needed

## 2017-10-20 NOTE — PLAN OF CARE
Problem: DISCHARGE PLANNING - CARE MANAGEMENT  Goal: Discharge to post-acute care or home with appropriate resources  INTERVENTIONS:  - Conduct assessment to determine patient/family and health care team treatment goals, and need for post-acute services based on payer coverage, community resources, and patient preferences, and barriers to discharge  - Address psychosocial, clinical, and financial barriers to discharge as identified in assessment in conjunction with the patient/family and health care team  - Arrange appropriate level of post-acute services according to patients   needs and preference and payer coverage in collaboration with the physician and health care team  - Communicate with and update the patient/family, physician, and health care team regarding progress on the discharge plan  - Arrange appropriate transportation to post-acute venues  Outcome: Progressing  10/20/2017 10:44 AM (ET) Jurgen Mccormick: CM spoke to pt at bedside and also spoke to Tanner Birmingham and Hodan Ellsworth (DON) at [facility]  Pt lives in his own apt at [facility] and gets assistance with ADL's from the staff  He must be able to walk  feet to be able to return to [facility]  Physical Therapy will continue to see pt while hospitalized  He walked approx 5 feet today with moderate assistance    He uses a walker  He is at [facility] due to his dementia and gait dysfunction  Manda Shahbaz will sent someone out to [facility] pt to see if he meets their criteria to return  He has been in [facility] for rehab in Jan-Feb of 2017  [address] uses [employer] and there is no problem with his co-pays  He does have an Advanced Directive and his POA is his son Juan Ramirez  He does not have issues with drugs or alcohol  He does not have axciety or depression  His PCP is Dr Marry Garcia  CM will put in a referral to [facility] in case he does not meet the criteria to return to [facility]  CM will continue to follow

## 2017-10-20 NOTE — PLAN OF CARE
Problem: PHYSICAL THERAPY ADULT  Goal: Performs mobility at highest level of function for planned discharge setting  See evaluation for individualized goals  Treatment/Interventions: Functional transfer training, LE strengthening/ROM, Therapeutic exercise, Endurance training, Cognitive reorientation, Patient/family training, Equipment eval/education, Bed mobility, Gait training, Continued evaluation, Spoke to nursing, Spoke to case management, OT  Equipment Recommended: Hassan Shone (RW at this time)       See flowsheet documentation for full assessment, interventions and recommendations  Prognosis: Good  Problem List: Decreased strength, Decreased endurance, Impaired balance, Decreased mobility, Decreased cognition, Decreased safety awareness, Impaired vision, Impaired hearing  Assessment: Pt is 80 y o  male seen for PT evaluation on 10/20/2017 s/p admit to [facility] on 10/19/2017 w/ Pneumonia; pt presented to ED w/ weakness and SOB  PT consulted to assess pt's functional mobility and d/c needs  Order placed for PT eval and tx, w/ up w/ A order  Comorbidities affecting pt's physical performance at time of assessment include: lactic acidosis, dementia, glaucoma, personal h/o CLL  Pt is poor historian 2* baseline dementia  PT discussed such w/ CM Kadie Pino) who reports she placed a call to [facility] for information regarding pt's functional status  PT will follow up w/ CM once information is obtained  PTA, pt was residing at [address] [facility]  Pt reporting he amb'ed w/ RW while there (? reliability 2* baseline dementia)  Personal factors affecting pt at time of IE include: inability to navigate level surfaces w/o external assistance, decreased cognition, hearing impairments, visual impairments, decreased initiation and engagement, limited insight into impairments, inability to perform IADLs and inability to perform ADLs   Please find objective findings from PT assessment regarding body systems outlined above with impairments and limitations including weakness, impaired balance, decreased endurance, decreased activity tolerance, decreased functional mobility tolerance, decreased safety awareness, fall risk and decreased cognition, as well as mobility assessment (need for cueing for mobility technique)  The following objective measures performed on IE also reveal limitations: Barthel Index: 20/100 and Modified Mary Alice: 4 (moderate/severe disability)  Pt's clinical presentation is currently unstable/unpredictable seen in pt's presentation of need for input for task focus and mobility technique and baseline dementia  Pt to benefit from continued PT tx to address deficits as defined above and maximize level of functional independent mobility and consistency  From PT/mobility standpoint, recommendation at time of d/c would be STR pending progress in order to facilitate return to PLOF  Barriers to Discharge:  (unknown how much A pt was receiving PTA)     Recommendation: Short-term skilled PT (PT to follow up w/ CM)     PT - OK to Discharge: No    See flowsheet documentation for full assessment

## 2017-10-20 NOTE — PROGRESS NOTES
[facility] Progress Note  Patient: Michoacano Marshall 80 y o  male   MRN: [id number]  PCP: Judith Martinez DO  Unit/Bed#: MS 208[facility] Encounter: [id number]  Date Of Visit: 10/20/17    Assessment:    Principal Problem:    Pneumonia  Active Problems:    Lactic acidosis    Dementia    Glaucoma    Personal history of CLL (chronic lymphocytic leukemia)      Plan:    · Sepsis present on admission:  Criteria for sepsis has improved  This is likely secondary to healthcare versus aspiration pneumonia  Speech therapy is pending  Continue with current coverage  Vancomycin could be discontinued if the nasal swab comes back negative  · Pneumonia:  Could be possibly secondary to gram-negative bacteria  · Thrombocytopenia:  Will out hit  Stop the heparin for now and repeat a CBC  · History of CLL:  No follow-up needed at this point  · Dementia: This is stable  · Glaucoma:  Continue eyedrops      VTE Pharmacologic Prophylaxis:   Pharmacologic: Enoxaparin (Lovenox)  Mechanical VTE Prophylaxis in Place: Yes    Patient Centered Rounds: I have performed bedside rounds with nursing staff today  Education and Discussions with Family / Patient:  Patient  Called the son:  Kept ringing    Time Spent for Care: 20 minutes  More than 50% of total time spent on counseling and coordination of care as described above  Current Length of Stay: 1 day(s)    Current Patient Status: Inpatient   Certification Statement: The patient will continue to require additional inpatient hospital stay due to Needing IV antibiotics and also having thrombocytopenia needing further workup    Discharge Plan:  Patient will likely be here through the weekend    Code Status: Level 3 - DNAR and DNI      Subjective:   Patient seen and examined  He looks a lot more alert today    States he feels little better    Objective:     Vitals:   Temp (24hrs), Av 6 °F (37 °C), Min:97 5 °F (36 4 °C), Max:100 6 °F (38 1 °C)    HR:  [58-91] 58  Resp: [18-30] 18  BP: (132-159)/(58-68) 135/62  SpO2:  [92 %-97 %] 95 %  Body mass index is 22 85 kg/m²  Input and Output Summary (last 24 hours): Intake/Output Summary (Last 24 hours) at 10/20/17 1047  Last data filed at 10/20/17 0433   Gross per 24 hour   Intake             1300 ml   Output              200 ml   Net             1100 ml       Physical Exam:     General Appearance:    Alert, cooperative, no distress, appears stated age                               Lungs:     Scattered rhonchi noted       Heart:    Regular rate and rhythm, S1 and S2 normal, no murmur, rub    or gallop   Abdomen:     Soft, non-tender, bowel sounds active all four quadrants,     no masses, no organomegaly           Extremities:   Extremities normal, atraumatic, no cyanosis or edema                       Additional Data:     Labs:      Results from last 7 days  Lab Units 10/19/17  2138 10/19/17  1308   WBC Thousand/uL  --  6 50   HEMOGLOBIN g/dL  --  12 5   HEMATOCRIT %  --  38 1   PLATELETS Thousands/uL 68* 96*   LYMPHO PCT %  --  46*   MONO PCT MAN %  --  2*   EOSINO PCT MANUAL %  --  1       Results from last 7 days  Lab Units 10/20/17  0508   SODIUM mmol/L 142   POTASSIUM mmol/L 3 9   CHLORIDE mmol/L 107   CO2 mmol/L 26   BUN mg/dL 20   CREATININE mg/dL 1 28   CALCIUM mg/dL 8 8   TOTAL PROTEIN g/dL 5 4*   BILIRUBIN TOTAL mg/dL 0 90   ALK PHOS U/L 89   ALT U/L 21   AST U/L 81*   GLUCOSE RANDOM mg/dL 125       Results from last 7 days  Lab Units 10/19/17  1308   INR  1 25*       * I Have Reviewed All Lab Data Listed Above  * Additional Pertinent Lab Tests Reviewed:  [facility]        Recent Cultures (last 7 days):       Results from last 7 days  Lab Units 10/19/17  2335   LEGIONELLA URINARY ANTIGEN  Negative       Last 24 Hours Medication List:     aspirin 81 mg Oral Daily   bimatoprost 1 drop Both Eyes HS   cefepime 2,000 mg Intravenous Q12H   cholecalciferol 2,000 Units Oral Daily cyanocobalamin 250 mcg Oral Daily   donepezil 10 mg Oral HS   dorzolamide 1 drop Both Eyes BID   ipratropium 0 5 mg Nebulization TID   memantine 10 mg Oral BID   metroNIDAZOLE 500 mg Intravenous Q8H   vancomycin 15 mg/kg Intravenous Q24H        Today, Patient Was Seen By: Jose Farias MD    ** Please Note: Dragon 360 Dictation voice to text software may have been used in the creation of this document   **

## 2017-10-20 NOTE — SOCIAL WORK
10/20/2017 10:44 AM (ET) Cristian Bias: CM spoke to pt at bedside and also spoke to Marge Lopez and Glen (DON) at [facility]  Pt lives in his own apt at [facility] and gets assistance with ADL's from the staff  He must be able to walk  feet to be able to return to [facility]  Physical Therapy will continue to see pt while hospitalized  He walked approx 5 feet today with moderate assistance    He uses a walker  He is at [facility] due to his dementia and gait dysfunction  Shivam Weller will sent someone out to [facility] to see if he meets their criteria to return  He has been in [facility] for [facility]hab in Jan-Feb of 2017  [address] uses [employer] and there is no problem with his co-pays  He does have an Advanced Directive and his POA is his son Alejandra Mejia  He does not have issues with drugs or alcohol  He does not have axciety or depression  His PCP is Dr Lynne Lazcano  CM will put in a referral to [facility] in case he does not meet the criteria to return to [facility]  CM will continue to follow

## 2017-10-20 NOTE — PROGRESS NOTES
Progress Note - Pulmonary   Thad Polly 80 y o  male MRN: [id number]  Unit/Bed#: -01 Encounter: [id number]    Assessment:  1  Pneumonia - HCAP vs aspiration    Plan:  1  Pneumonia - HCAP vs aspiration  - CT scan done shows RML and RLL patchy airspace opacities worrysome for infiltrates  Mild ground glass opacity RUL  - Aspiration pneuomonia vs HCAP - He is high aspiration risk with all consistencies on swallow eval in February but family decided to continue with PO despite the risk  Given he also lives in assisted living would continue broad spectrum with cefepime, flagyl, vanc - stop vanc if MRSA swab negative  - He is afebrile now just over 24 hours, no leukocytosis, did have elevated lactic but that has cleared with some IV fluids  Not requiring any oxygen, sats are mid 90s on room air  - Continue ipratropium nebs to help with mucous clearance, chest PT  Will add mucinex given he is taking PO  - Will need follow up imaging in 6-8 weeks      Subjective:   Patient sitting the the chair eating lunch  Complaining of having a cough, no shortness of breath  Objective:     Vitals: Blood pressure 135/62, pulse 58, temperature 97 5 °F (36 4 °C), temperature source Oral, resp  rate 18, height 6' 2" (1 88 m), weight 80 7 kg (178 lb), SpO2 96 %  ,Body mass index is 22 85 kg/m²        Intake/Output Summary (Last 24 hours) at 10/20/17 1410  Last data filed at 10/20/17 1359   Gross per 24 hour   Intake             1660 ml   Output              200 ml   Net             1460 ml       Invasive Devices     Peripheral Intravenous Line            Peripheral IV 10/19/17 Left Forearm 1 day    Peripheral IV 10/19/17 Right Forearm 1 day                Physical Exam: General appearance: alert, appears stated age and cooperative  Lungs: Scattered rhonchi bilaterally, improved from yesterday  Heart: regular rate and rhythm and S1, S2 normal  Extremities: extremities normal, atraumatic, no cyanosis or edema  Neurologic: Mental status: alertness: alert     Labs: I have personally reviewed pertinent lab results  , CBC:   Lab Results   Component Value Date    WBC 5 29 10/20/2017    HGB 11 6 (L) 10/20/2017    HCT 35 1 (L) 10/20/2017    MCV 96 10/20/2017    PLT 57 (L) 10/20/2017    MCH 31 6 10/20/2017    MCHC 33 0 10/20/2017    RDW 12 8 10/20/2017    MPV 10 3 10/20/2017   , CMP:   Lab Results   Component Value Date     10/20/2017    K 3 9 10/20/2017     10/20/2017    CO2 26 10/20/2017    ANIONGAP 9 10/20/2017    BUN 20 10/20/2017    CREATININE 1 28 10/20/2017    GLUCOSE 125 10/20/2017    CALCIUM 8 8 10/20/2017    AST 81 (H) 10/20/2017    ALT 21 10/20/2017    ALKPHOS 89 10/20/2017    PROT 5 4 (L) 10/20/2017    ALBUMIN 2 6 (L) 10/20/2017    BILITOT 0 90 10/20/2017    EGFR 48 10/20/2017     Imaging and other studies: I have personally reviewed pertinent reports     and I have personally reviewed pertinent films in PACS

## 2017-10-20 NOTE — PLAN OF CARE
Problem: SLP ADULT - SWALLOWING, IMPAIRED  Goal: Initial SLP swallow eval performed  Outcome: Completed Date Met: 10/20/17    Goal: Advance to least restrictive diet without signs or symptoms of aspiration for planned discharge setting  See evaluation for individualized goals  Patient and family will demonstrate strategies to minimize risk of aspiration from PO intake x100% during PO intake    Outcome: Progressing

## 2017-10-20 NOTE — CASE MANAGEMENT
[address] [place] [address] in the [facility] by Erik Mora for 2017  Network Utilization Review Department  Phone: [phone]; Fax [phone]  ATTENTION: The Network Utilization Review Department is now centralized for our 7 Facilities  Please call with any questions or concerns to [phone] and carefully follow the prompts so that you are directed to the right person  All voicemails are confidential  Fax any determinations, approvals, denials, and requests for initial or continue stay review clinical to [phone]  Due to HIGH CALL volume, it would be easier if you could please send faxed requests to expedite your requests and in part, help us provide discharge notifications faster   /////////////////////////////////////////////////////////////////////////////////////////////////////////////////////////////////////////////////////////////////////////////////////////////////////////////////////////////////////////////////////////    Initial Clinical Review    Admission: Date/Time/Statement: 10/19/17 @ 1442     Orders Placed This Encounter   Procedures    Inpatient Admission (expected length of stay for this patient is greater than two midnights)     Standing Status:   Standing     Number of Occurrences:   1     Order Specific Question:   Admitting Physician     Answer:   Veince Pearce [[id number]]     Order Specific Question:   Level of Care     Answer:   Med Surg [[id number]]     Order Specific Question:   Estimated length of stay     Answer:   More than 2 Midnights     Order Specific Question:   Certification     Answer:   I certify that inpatient services are medically necessary for this patient for a duration of greater than two midnights  See H&P and MD Progress Notes for additional information about the patient's course of treatment           ED: Date/Time/Mode of Arrival:   ED Arrival Information     Expected Arrival Acuity Means of Arrival Escorted By Service Admission Type    - 10/19/2017 12:47 Emergent Ambulance [address] Emergency    Arrival Complaint    -          Chief Complaint:   Chief Complaint   Patient presents with    Fever - 75 years or older     Pt brought via EMS for evaluation of fever and confusion x1 day  Pt diagnosed with pnumonia 3 days prior to arrival     History of Illness:     80 y o  male who presents with  SOB, congestion,  Worsening of baseline confusion  Fever as high as 102 0 at his nursing home  Was diagnosed with pneumonia 3 days ago but uncertain the exact way this was caught  He was started on Levaquin which has not helped him  Patient is known to aspirate and the last time he was hospitalized for aspiration pneumonia was in February - after that he went to a skilled nursing facility  He and family had denied G-tube evaluation and treatment several months ago, thus he is still p o  ED Vital Signs:   ED Triage Vitals   Temperature Pulse Respirations Blood Pressure SpO2   10/19/17 1302 10/19/17 1300 10/19/17 1300 10/19/17 1302 10/19/17 1300   98 8 °F (37 1 °C) 91 (!) 23 159/68 94 %      Temp Source Heart Rate Source Patient Position - Orthostatic VS BP Location FiO2 (%)   10/19/17 1302 10/19/17 1300 10/19/17 1300 10/19/17 1300 --   Oral Monitor Lying Right arm       Pain Score       10/19/17 2100       No Pain        Wt Readings from Last 1 Encounters:   10/19/17 80 7 kg (178 lb)     IN ER GIVEN   IVF NS 1 liter;  IV zosyn;  IV vanco  Impression:  Likely pneumonia, also likely to be from aspiration given his history of difficulty swallowing  We will move forward with septic evaluation and empiric treatment with vancomycin and Zosyn for anaerobic and aspirated organisms  Abnormal Labs/Diagnostic Test Results:   Wbc  6 50  hgb   12 5  PTE  96  >  98   Urine strep pneumoniae - neg     Past Medical/Surgical History:    Active Ambulatory Problems     Diagnosis Date Noted    Healthcare-associated pneumonia 01/31/2017    Hypoxia 01/31/2017    Generalized weakness 01/31/2017    Acute respiratory insufficiency 02/13/2017    Hypokalemia 02/13/2017    Anemia 02/13/2017    Leukopenia 02/13/2017    Dysphagia 02/13/2017    Hyperlipidemia 02/13/2017    Dementia 02/13/2017    Acute sinusitis 02/13/2017    Hypophosphatemia 02/14/2017    Vitamin D deficiency 02/14/2017    Cerebral atrophy 02/14/2017    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 Probable aspiration pneumonia  I will put the patient on cefepime and Flagyl and monitor  We will have speech therapy evaluate the patient     Plan for Additional Problems:   · Lactic acidosis: This is likely secondary to the pneumonia  Put the patient on some IV fluids but be careful not to volume overload the patient  Will check another lactic acid level  · Dementia:  The patient is on Aricept and Namenda  · History of CLL:  The patient does have splenomegaly on the CT scan  This is stable  · Glaucoma:  Continue the eyedrops     VTE Prophylaxis: Enoxaparin (Lovenox)  / sequential compression device   Anticipated Length of Stay:  Patient will be admitted on an Inpatient basis with an anticipated length of stay of  greater than 2 midnights     Justification for Hospital Stay:  Patient will greater than 2 midnights secondary to having aspiration pneumonia needing IV antibiotics as well as pulmonology and speech therapy evaluations       Admission Orders:  Scheduled Meds:   aspirin 81 mg Oral Daily   bimatoprost 1 drop Both Eyes HS   cefepime 2,000 mg Intravenous Q12H   cholecalciferol 2,000 Units Oral Daily   cyanocobalamin 250 mcg Oral Daily   donepezil 10 mg Oral HS   dorzolamide 1 drop Both Eyes BID   ipratropium 0 5 mg Nebulization TID   memantine 10 mg Oral BID   metroNIDAZOLE 500 mg Intravenous Q8H   vancomycin 15 mg/kg Intravenous Q24H     Continuous Infusions:   sodium chloride 75 mL/hr Last Rate: 75 mL/hr (10/19/17 2118)     10/19  PULMONARY   1  Pneumonia - HCAP vs aspiration  - CT scan done shows RML and RLL patchy airspace opacities worrysome for infiltrates  Mild ground glass opacity RUL  - Patient had an aspiration pneumonia back in February, was treated with 7 days of antibiotics  At that time he was deemed high risk for aspiration for all consistencies but the family insisted on him taking PO despite the risk  So more likely to be aspiration related  - He will be put on cefepime and flagyl to cover for aspiration and HCAP given he lives in assisted living  Would also add Vanc and do MRSA swab, can stop if negative  Was started on Levaquin as outpatient but did not improve  - He is febrile, no leukocytosis, did have elevated lactic but that has cleared with some IV fluids  Not requiring any oxygen, sats are mid 90s on room air  - Will give ipratropium nebs to help with mucous clearance, add chest PT   No mucinex until he is seen by speech therapy for appropriate PO recommendations - suspect he will still be deemed unsafe for any PO  - Will need follow up imaging in 6-8 weeks      10/20/1017  98   58 - 63    18    135/62   RA sats  92 - 95%     Non prod cough

## 2017-10-20 NOTE — PLAN OF CARE
DISCHARGE PLANNING     Discharge to home or other facility with appropriate resources Progressing        HEMATOLOGIC - ADULT     Maintains hematologic stability Progressing        Knowledge Deficit     Patient/family/caregiver demonstrates understanding of disease process, treatment plan, medications, and discharge instructions Progressing        MUSCULOSKELETAL - ADULT     Maintain or return mobility to safest level of function Progressing        NEUROSENSORY - ADULT     Achieves stable or improved neurological status Progressing        Potential for Falls     Patient will remain free of falls Progressing        Prexisting or High Potential for Compromised Skin Integrity     Skin integrity is maintained or improved Progressing        RESPIRATORY - ADULT     Achieves optimal ventilation and oxygenation Progressing        SAFETY ADULT     Maintain or return to baseline ADL function Progressing     Maintain or return mobility status to optimal level Progressing        SKIN/TISSUE INTEGRITY - ADULT     Skin integrity remains intact Progressing     Incision(s), wounds(s) or drain site(s) healing without S/S of infection Progressing

## 2017-10-20 NOTE — CONSULTS
Progress Note - Wound   Maci Caulk 80 y o  male MRN: [id number]  Unit/Bed#: -01 Encounter: [id number]      Assessment: Patient is 80year old male who presents with SOB and weakness  Admitted with pneumonia  Patient has a history of - dementia, dermatitis, edema, glaucoma, HTN and neuropathy  Wound care consulted for pressure injury to the buttocks on admission  Patient seen Colleen Ash in recliner chair sitting on soft care cushion  Patient assist of one to stand using rolling walker  Incontinent of urine at times per flow sheets  B/l heels are intact with no redness noted  No pressure injury noted to the buttocks or sacrum upon assessment  Blanchable redness noted to L and R buttock, and sacral areas  R buttock is intact with areas of thick scarring noted, noting previous now healed skin breakdown  Recommend preventative skin care orders  Blanchable redness noted to mid spine on bony prominence of the spine - recommend offloading and hydraguard  Flow sheets updated  Primary nurse at bedside aware of assessment findings  Wound care will sign off as patient has no active wounds  Plan:   1  Apply hydraguard to b/l heels, buttocks, sacrum and mid spine BID and PRN for prevention and protection  2  Apply skin nourishing cream to the entire skin daily for moisture daily  3  Soft care cushion to chair when OOB  4  Turn and reposition patient every 2 hours   5  Elevate heels off of bed with pillows to offload     Objective:    Vitals: Blood pressure 135/62, pulse 58, temperature 97 5 °F (36 4 °C), temperature source Oral, resp  rate 18, height 6' 2" (1 88 m), weight 80 7 kg (178 lb), SpO2 95 %  ,Body mass index is 22 85 kg/m²  Recommendations written as orders    Valorie Councilman RN BSN

## 2017-10-21 LAB
ALBUMIN SERPL BCP-MCNC: 2 G/DL (ref 3.5–5)
ALBUMIN SERPL BCP-MCNC: 2.3 G/DL (ref 3.5–5)
ALP SERPL-CCNC: 70 U/L (ref 46–116)
ALP SERPL-CCNC: 78 U/L (ref 46–116)
ALT SERPL W P-5'-P-CCNC: 19 U/L (ref 12–78)
ALT SERPL W P-5'-P-CCNC: 20 U/L (ref 12–78)
ANION GAP SERPL CALCULATED.3IONS-SCNC: 9 MMOL/L (ref 4–13)
APTT PPP: 104 SECONDS (ref 23–35)
APTT PPP: 112 SECONDS (ref 23–35)
APTT PPP: 46 SECONDS (ref 23–35)
APTT PPP: 52 SECONDS (ref 23–35)
AST SERPL W P-5'-P-CCNC: 28 U/L (ref 5–45)
AST SERPL W P-5'-P-CCNC: 30 U/L (ref 5–45)
BILIRUB DIRECT SERPL-MCNC: 0.11 MG/DL (ref 0–0.2)
BILIRUB DIRECT SERPL-MCNC: 0.13 MG/DL (ref 0–0.2)
BILIRUB SERPL-MCNC: 0.5 MG/DL (ref 0.2–1)
BILIRUB SERPL-MCNC: 1.2 MG/DL (ref 0.2–1)
BUN SERPL-MCNC: 22 MG/DL (ref 5–25)
CALCIUM SERPL-MCNC: 8.9 MG/DL (ref 8.3–10.1)
CHLORIDE SERPL-SCNC: 109 MMOL/L (ref 100–108)
CO2 SERPL-SCNC: 25 MMOL/L (ref 21–32)
CREAT SERPL-MCNC: 1.24 MG/DL (ref 0.6–1.3)
ERYTHROCYTE [DISTWIDTH] IN BLOOD BY AUTOMATED COUNT: 12.9 % (ref 11.6–15.1)
ERYTHROCYTE [DISTWIDTH] IN BLOOD BY AUTOMATED COUNT: 13.1 % (ref 11.6–15.1)
ERYTHROCYTE [DISTWIDTH] IN BLOOD BY AUTOMATED COUNT: 13.1 % (ref 11.6–15.1)
GFR SERPL CREATININE-BSD FRML MDRD: 49 ML/MIN/1.73SQ M
GLUCOSE SERPL-MCNC: 121 MG/DL (ref 65–140)
HCT VFR BLD AUTO: 30.6 % (ref 36.5–49.3)
HCT VFR BLD AUTO: 32.8 % (ref 36.5–49.3)
HCT VFR BLD AUTO: 36.9 % (ref 36.5–49.3)
HGB BLD-MCNC: 10.2 G/DL (ref 12–17)
HGB BLD-MCNC: 10.7 G/DL (ref 12–17)
HGB BLD-MCNC: 11.7 G/DL (ref 12–17)
INR PPP: 1.24 (ref 0.86–1.16)
INR PPP: 1.66 (ref 0.86–1.16)
MCH RBC QN AUTO: 31.1 PG (ref 26.8–34.3)
MCH RBC QN AUTO: 31.3 PG (ref 26.8–34.3)
MCH RBC QN AUTO: 31.6 PG (ref 26.8–34.3)
MCHC RBC AUTO-ENTMCNC: 31.7 G/DL (ref 31.4–37.4)
MCHC RBC AUTO-ENTMCNC: 32.6 G/DL (ref 31.4–37.4)
MCHC RBC AUTO-ENTMCNC: 33.3 G/DL (ref 31.4–37.4)
MCV RBC AUTO: 95 FL (ref 82–98)
MCV RBC AUTO: 95 FL (ref 82–98)
MCV RBC AUTO: 99 FL (ref 82–98)
MRSA NOSE QL CULT: NORMAL
PF4 HEPARIN CMPLX AB SER QL: NEGATIVE
PLATELET # BLD AUTO: 33 THOUSANDS/UL (ref 149–390)
PLATELET # BLD AUTO: 35 THOUSANDS/UL (ref 149–390)
PLATELET # BLD AUTO: 37 THOUSANDS/UL (ref 149–390)
PMV BLD AUTO: 10.6 FL (ref 8.9–12.7)
PMV BLD AUTO: 10.7 FL (ref 8.9–12.7)
PMV BLD AUTO: 11.9 FL (ref 8.9–12.7)
POTASSIUM SERPL-SCNC: 3.3 MMOL/L (ref 3.5–5.3)
PROT SERPL-MCNC: 4.6 G/DL (ref 6.4–8.2)
PROT SERPL-MCNC: 5.2 G/DL (ref 6.4–8.2)
PROTHROMBIN TIME: 15.9 SECONDS (ref 12.1–14.4)
PROTHROMBIN TIME: 20.1 SECONDS (ref 12.1–14.4)
RBC # BLD AUTO: 3.23 MILLION/UL (ref 3.88–5.62)
RBC # BLD AUTO: 3.44 MILLION/UL (ref 3.88–5.62)
RBC # BLD AUTO: 3.74 MILLION/UL (ref 3.88–5.62)
SODIUM SERPL-SCNC: 143 MMOL/L (ref 136–145)
WBC # BLD AUTO: 4.81 THOUSAND/UL (ref 4.31–10.16)
WBC # BLD AUTO: 4.88 THOUSAND/UL (ref 4.31–10.16)
WBC # BLD AUTO: 6.18 THOUSAND/UL (ref 4.31–10.16)

## 2017-10-21 PROCEDURE — 82542 COL CHROMOTOGRAPHY QUAL/QUAN: CPT | Performed by: FAMILY MEDICINE

## 2017-10-21 PROCEDURE — 85730 THROMBOPLASTIN TIME PARTIAL: CPT | Performed by: FAMILY MEDICINE

## 2017-10-21 PROCEDURE — 94640 AIRWAY INHALATION TREATMENT: CPT

## 2017-10-21 PROCEDURE — 85027 COMPLETE CBC AUTOMATED: CPT | Performed by: FAMILY MEDICINE

## 2017-10-21 PROCEDURE — 80076 HEPATIC FUNCTION PANEL: CPT | Performed by: FAMILY MEDICINE

## 2017-10-21 PROCEDURE — 94668 MNPJ CHEST WALL SBSQ: CPT

## 2017-10-21 PROCEDURE — 80048 BASIC METABOLIC PNL TOTAL CA: CPT | Performed by: FAMILY MEDICINE

## 2017-10-21 PROCEDURE — 94760 N-INVAS EAR/PLS OXIMETRY 1: CPT

## 2017-10-21 PROCEDURE — 85610 PROTHROMBIN TIME: CPT | Performed by: FAMILY MEDICINE

## 2017-10-21 RX ORDER — ARGATROBAN 1 MG/ML
.05-3 INJECTION, SOLUTION INTRAVENOUS CONTINUOUS
Status: DISCONTINUED | OUTPATIENT
Start: 2017-10-21 | End: 2017-10-28

## 2017-10-21 RX ORDER — POTASSIUM CHLORIDE 20MEQ/15ML
40 LIQUID (ML) ORAL ONCE
Status: COMPLETED | OUTPATIENT
Start: 2017-10-21 | End: 2017-10-21

## 2017-10-21 RX ADMIN — BIMATOPROST 1 DROP: 0.1 SOLUTION/ DROPS OPHTHALMIC at 22:22

## 2017-10-21 RX ADMIN — CYANOCOBALAMIN TAB 500 MCG 250 MCG: 500 TAB at 09:04

## 2017-10-21 RX ADMIN — DONEPEZIL HYDROCHLORIDE 10 MG: 5 TABLET ORAL at 22:22

## 2017-10-21 RX ADMIN — MEMANTINE HYDROCHLORIDE 10 MG: 5 TABLET ORAL at 18:18

## 2017-10-21 RX ADMIN — POTASSIUM CHLORIDE 40 MEQ: 20 SOLUTION ORAL at 12:44

## 2017-10-21 RX ADMIN — METRONIDAZOLE 500 MG: 500 TABLET ORAL at 22:21

## 2017-10-21 RX ADMIN — GUAIFENESIN 600 MG: 600 TABLET, EXTENDED RELEASE ORAL at 09:03

## 2017-10-21 RX ADMIN — IPRATROPIUM BROMIDE 0.5 MG: 0.5 SOLUTION RESPIRATORY (INHALATION) at 20:48

## 2017-10-21 RX ADMIN — GUAIFENESIN 600 MG: 600 TABLET, EXTENDED RELEASE ORAL at 22:21

## 2017-10-21 RX ADMIN — VANCOMYCIN HYDROCHLORIDE 1250 MG: 5 INJECTION, POWDER, LYOPHILIZED, FOR SOLUTION INTRAVENOUS at 12:47

## 2017-10-21 RX ADMIN — IPRATROPIUM BROMIDE 0.5 MG: 0.5 SOLUTION RESPIRATORY (INHALATION) at 14:26

## 2017-10-21 RX ADMIN — LEVALBUTEROL HYDROCHLORIDE 1.25 MG: 1.25 SOLUTION, CONCENTRATE RESPIRATORY (INHALATION) at 08:55

## 2017-10-21 RX ADMIN — CEFEPIME HYDROCHLORIDE 2000 MG: 2 INJECTION, POWDER, FOR SOLUTION INTRAVENOUS at 22:21

## 2017-10-21 RX ADMIN — ARGATROBAN 2 MCG/KG/MIN: 1 INJECTION INTRAVENOUS at 13:47

## 2017-10-21 RX ADMIN — GUAIFENESIN 600 MG: 600 TABLET, EXTENDED RELEASE ORAL at 14:09

## 2017-10-21 RX ADMIN — METRONIDAZOLE 500 MG: 500 TABLET ORAL at 13:46

## 2017-10-21 RX ADMIN — DORZOLAMIDE HYDROCHLORIDE 1 DROP: 20 SOLUTION/ DROPS OPHTHALMIC at 18:18

## 2017-10-21 RX ADMIN — CEFEPIME HYDROCHLORIDE 2000 MG: 2 INJECTION, POWDER, FOR SOLUTION INTRAVENOUS at 09:07

## 2017-10-21 RX ADMIN — IPRATROPIUM BROMIDE 0.5 MG: 0.5 SOLUTION RESPIRATORY (INHALATION) at 08:55

## 2017-10-21 RX ADMIN — CHOLECALCIFEROL TAB 25 MCG (1000 UNIT) 2000 UNITS: 25 TAB at 09:03

## 2017-10-21 RX ADMIN — METRONIDAZOLE 500 MG: 500 TABLET ORAL at 05:21

## 2017-10-21 RX ADMIN — DORZOLAMIDE HYDROCHLORIDE 1 DROP: 20 SOLUTION/ DROPS OPHTHALMIC at 09:08

## 2017-10-21 RX ADMIN — LEVALBUTEROL HYDROCHLORIDE 1.25 MG: 1.25 SOLUTION, CONCENTRATE RESPIRATORY (INHALATION) at 14:26

## 2017-10-21 RX ADMIN — MEMANTINE HYDROCHLORIDE 10 MG: 5 TABLET ORAL at 09:03

## 2017-10-21 RX ADMIN — ASPIRIN 81 MG 81 MG: 81 TABLET ORAL at 09:03

## 2017-10-21 NOTE — PROGRESS NOTES
[facility] Progress Note  Patient: Wily Rehman 80 y o  male   MRN: [id number]  PCP: Ursula Ruffin DO  Unit/Bed#: -[facility] Encounter: [id number]  Date Of Visit: 10/21/17    Assessment:    Principal Problem:    Pneumonia  Active Problems:    Lactic acidosis    Dementia    Glaucoma    Personal history of CLL (chronic lymphocytic leukemia)   Thrombocytopenia  Plan:    · Sepsis: This present on admission  This is secondary to aspiration versus healthcare acquired pneumonia  Speech therapy does recommend NPO however with discussion with the family the past the family wanted patient to have oral feeds  · Thrombocytopenia  Rapid is negative  Serotonin assay has been sent for will be sent today  I would keep the patient on argatroban right now until that does come back and that may take 3 days  · History of CLL:  Stable  · Dementia: This is stable  · With Labar continue eyedrops  · Pneumonia:  Rule out gram-negative organism  Continue with cefepime and Flagyl  VTE Pharmacologic Prophylaxis:   Pharmacologic: Argatroban  Mechanical VTE Prophylaxis in Place: Yes    Patient Centered Rounds: I have performed bedside rounds with nursing staff today  Education and Discussions with Family / Patient: Will call the son    Time Spent for Care: 20 minutes  More than 50% of total time spent on counseling and coordination of care as described above  Current Length of Stay: 2 day(s)    Current Patient Status: Inpatient   Certification Statement: The patient will continue to require additional inpatient hospital stay due to Needing IV antibiotics and also needing monitoring of his platelets and argatroban drip    Discharge Plan:  Anticipate patient will be here at least until Monday or Tuesday    Code Status: Level 3 - DNAR and DNI      Subjective:   Patient seen and examined  No acute events reported  The patient has no complaints at this time      Objective:     Vitals:   Temp (24hrs), Av °F (36 7 °C), Min:97 8 °F (36 6 °C), Max:98 2 °F (36 8 °C)    HR:  [52-66] 52  Resp:  [16-17] 17  BP: (140-160)/(63-69) 140/64  SpO2:  [91 %-99 %] 99 %  Body mass index is 22 85 kg/m²  Input and Output Summary (last 24 hours): Intake/Output Summary (Last 24 hours) at 10/21/17 1146  Last data filed at 10/21/17 0901   Gross per 24 hour   Intake              240 ml   Output                0 ml   Net              240 ml       Physical Exam:     General Appearance:    Alert, cooperative, no distress, appears stated age                               Lungs:   Scattered rhonchi       Heart:    Regular rate and rhythm, S1 and S2 normal, no murmur, rub    or gallop   Abdomen:     Soft, non-tender, bowel sounds active all four quadrants,     no masses, no organomegaly           Extremities:   Extremities normal, atraumatic, no cyanosis or edema                       Additional Data:     Labs:      Results from last 7 days  Lab Units 10/21/17  0450  10/19/17  1308   WBC Thousand/uL 6 18  < > 6 50   HEMOGLOBIN g/dL 10 7*  < > 12 5   HEMATOCRIT % 32 8*  < > 38 1   PLATELETS Thousands/uL 37*  < > 96*   LYMPHO PCT %  --   --  46*   MONO PCT MAN %  --   --  2*   EOSINO PCT MANUAL %  --   --  1   < > = values in this interval not displayed  Results from last 7 days  Lab Units 10/21/17  0450 10/20/17  0508   SODIUM mmol/L 143 142   POTASSIUM mmol/L 3 3* 3 9   CHLORIDE mmol/L 109* 107   CO2 mmol/L 25 26   BUN mg/dL 22 20   CREATININE mg/dL 1 24 1 28   CALCIUM mg/dL 8 9 8 8   TOTAL PROTEIN g/dL  --  5 4*   BILIRUBIN TOTAL mg/dL  --  0 90   ALK PHOS U/L  --  89   ALT U/L  --  21   AST U/L  --  81*   GLUCOSE RANDOM mg/dL 121 125       Results from last 7 days  Lab Units 10/19/17  1308   INR  1 25*       * I Have Reviewed All Lab Data Listed Above  * Additional Pertinent Lab Tests Reviewed:  [facility]        Recent Cultures (last 7 days):       Results from last 7 days  Lab Units 10/20/17  1321 10/19/17  2335 10/19/17  1316 10/19/17  1308   BLOOD CULTURE   --   --  No Growth at 24 hrs  No Growth at 24 hrs  SPUTUM CULTURE  Culture too young- will reincubate  --   --   --    GRAM STAIN RESULT  2+ Epithelial cells per low power field  1+ Polys  1+ Gram positive cocci in pairs  --   --   --    LEGIONELLA URINARY ANTIGEN   --  Negative  --   --        Last 24 Hours Medication List:     aspirin 81 mg Oral Daily   bimatoprost 1 drop Both Eyes HS   cefepime 2,000 mg Intravenous Q12H   cholecalciferol 2,000 Units Oral Daily   cyanocobalamin 250 mcg Oral Daily   donepezil 10 mg Oral HS   dorzolamide 1 drop Both Eyes BID   guaiFENesin 600 mg Oral Q12H SONY   ipratropium 0 5 mg Nebulization TID   memantine 10 mg Oral BID   metroNIDAZOLE 500 mg Oral Q8H [address]   vancomycin 15 mg/kg Intravenous Q24H        Today, Patient Was Seen By: Delia Rivera MD    ** Please Note: Dragon 360 Dictation voice to text software may have been used in the creation of this document   **

## 2017-10-21 NOTE — PLAN OF CARE
Problem: Prexisting or High Potential for Compromised Skin Integrity  Goal: Skin integrity is maintained or improved  INTERVENTIONS:  - Identify patients at risk for skin breakdown  - Assess and monitor skin integrity  - Assess and monitor nutrition and hydration status  - Monitor labs (i e  albumin)  - Assess for incontinence   - Turn and reposition patient  - Assist with mobility/ambulation  - Relieve pressure over bony prominences  - Avoid friction and shearing  - Provide appropriate hygiene as needed including keeping skin clean and dry  - Evaluate need for skin moisturizer/barrier cream  - Collaborate with interdisciplinary team (i e  Nutrition, Rehabilitation, etc )   - Patient/family teaching   Outcome: Progressing      Problem: Potential for Falls  Goal: Patient will remain free of falls  INTERVENTIONS:  - Assess patient frequently for physical needs  -  Identify cognitive and physical deficits and behaviors that affect risk of falls    -  [place] fall precautions as indicated by assessment   - Educate patient/family on patient safety including physical limitations  - Instruct patient to call for assistance with activity based on assessment  - Modify environment to reduce risk of injury  - Consider OT/PT consult to assist with strengthening/mobility   Outcome: Progressing      Problem: SAFETY ADULT  Goal: Maintain or return to baseline ADL function  INTERVENTIONS:  -  Assess patient's ability to carry out ADLs; assess patient's baseline for ADL function and identify physical deficits which impact ability to perform ADLs (bathing, care of mouth/teeth, toileting, grooming, dressing, etc )  - Assess/evaluate cause of self-care deficits   - Assess range of motion  - Assess patient's mobility; develop plan if impaired  - Assess patient's need for assistive devices and provide as appropriate  - Encourage maximum independence but intervene and supervise when necessary  ¯ Involve family in performance of ADLs  ¯ Assess for home care needs following discharge   ¯ Request OT consult to assist with ADL evaluation and planning for discharge  ¯ Provide patient education as appropriate   Outcome: Progressing    Goal: Maintain or return mobility status to optimal level  INTERVENTIONS:  - Assess patient's baseline mobility status (ambulation, transfers, stairs, etc )    - Identify cognitive and physical deficits and behaviors that affect mobility  - Identify mobility aids required to assist with transfers and/or ambulation (gait belt, sit-to-stand, lift, walker, cane, etc )  - [place] fall precautions as indicated by assessment  - Record patient progress and toleration of activity level on Mobility SBAR; progress patient to next Phase/Stage  - Instruct patient to call for assistance with activity based on assessment  - Request Rehabilitation consult to assist with strengthening/weightbearing, etc    Outcome: Progressing      Problem: DISCHARGE PLANNING  Goal: Discharge to home or other facility with appropriate resources  INTERVENTIONS:  - Identify barriers to discharge w/patient and caregiver  - Arrange for needed discharge resources and transportation as appropriate  - Identify discharge learning needs (meds, wound care, etc )  - Arrange for interpretive services to assist at discharge as needed  - Refer to Case Management Department for coordinating discharge planning if the patient needs post-hospital services based on physician/advanced practitioner order or complex needs related to functional status, cognitive ability, or social support system   Outcome: Progressing      Problem: Knowledge Deficit  Goal: Patient/family/caregiver demonstrates understanding of disease process, treatment plan, medications, and discharge instructions  Complete learning assessment and assess knowledge base    Interventions:  - Provide teaching at level of understanding  - Provide teaching via preferred learning methods   Outcome: Progressing      Problem: NEUROSENSORY - ADULT  Goal: Achieves stable or improved neurological status  INTERVENTIONS  - Monitor and report changes in neurological status  - Initiate measures to prevent increased intracranial pressure  - Maintain blood pressure and fluid volume within ordered parameters to optimize cerebral perfusion  - Monitor temperature, glucose, and sodium or any other associated labs   Initiate appropriate interventions as ordered  - Monitor for seizure activity   - Administer anti-seizure medications as ordered   Outcome: Progressing      Problem: RESPIRATORY - ADULT  Goal: Achieves optimal ventilation and oxygenation  INTERVENTIONS:  - Assess for changes in respiratory status  - Assess for changes in mentation and behavior  - Position to facilitate oxygenation and minimize respiratory effort  - Oxygen administration by appropriate delivery method based on oxygen saturation (per order) or ABGs  - Initiate smoking cessation education as indicated  - Encourage broncho-pulmonary hygiene including cough, deep breathe, Incentive Spirometry  - Assess the need for suctioning and aspirate as needed  - Assess and instruct to report SOB or any respiratory difficulty  - Respiratory Therapy support as indicated    - Keep pt in upright position to prevent aspiration    Outcome: Progressing      Problem: SKIN/TISSUE INTEGRITY - ADULT  Goal: Skin integrity remains intact  INTERVENTIONS  - Identify patients at risk for skin breakdown  - Assess and monitor skin integrity  - Assess and monitor nutrition and hydration status  - Monitor labs (i e  albumin)  - Assess for incontinence   - Turn and reposition patient  - Assist with mobility/ambulation  - Relieve pressure over bony prominences  - Avoid friction and shearing  - Provide appropriate hygiene as needed including keeping skin clean and dry  - Evaluate need for skin moisturizer/barrier cream  - Collaborate with interdisciplinary team (i e  Nutrition, Rehabilitation, etc )   - Patient/family teaching   Outcome: Progressing    Goal: Incision(s), wounds(s) or drain site(s) healing without S/S of infection  INTERVENTIONS  - Assess and document risk factors for skin impairment   - Assess and document dressing, incision, wound bed, drain sites and surrounding tissue  - Initiate Nutrition services consult and/or wound management as needed   Outcome: Progressing      Problem: HEMATOLOGIC - ADULT  Goal: Maintains hematologic stability  INTERVENTIONS  - Assess for signs and symptoms of bleeding or hemorrhage  - Monitor labs  - Administer supportive blood products/factors as ordered and appropriate   Outcome: Progressing      Problem: MUSCULOSKELETAL - ADULT  Goal: Maintain or return mobility to safest level of function  INTERVENTIONS:  - Assess patient's ability to carry out ADLs; assess patient's baseline for ADL function and identify physical deficits which impact ability to perform ADLs (bathing, care of mouth/teeth, toileting, grooming, dressing, etc )  - Assess/evaluate cause of self-care deficits   - Assess range of motion  - Assess patient's mobility; develop plan if impaired  - Assess patient's need for assistive devices and provide as appropriate  - Encourage maximum independence but intervene and supervise when necessary  - Involve family in performance of ADLs  - Assess for home care needs following discharge   - Request OT consult to assist with ADL evaluation and planning for discharge  - Provide patient education as appropriate   Outcome: Progressing      Problem: Nutrition/Hydration-ADULT  Goal: Nutrient/Hydration intake appropriate for improving, restoring or maintaining nutritional needs  Monitor and assess patient's nutrition/hydration status for malnutrition (ex- brittle hair, bruises, dry skin, pale skin and conjunctiva, muscle wasting, smooth red tongue, and disorientation)   Collaborate with interdisciplinary team and initiate plan and interventions as ordered  Monitor patient's weight and dietary intake as ordered or per policy  Utilize nutrition screening tool and intervene per policy  Determine patient's food preferences and provide high-protein, high-caloric foods as appropriate       INTERVENTIONS:  - Monitor oral intake, urinary output, labs, and treatment plans  - Assess nutrition and hydration status and recommend course of action  - Evaluate amount of meals eaten  - Assist patient with eating if necessary   - Allow adequate time for meals  - Recommend/ encourage appropriate diets, oral nutritional supplements, and vitamin/mineral supplements  - Order, calculate, and assess calorie counts as needed  - Recommend, monitor, and adjust tube feedings and TPN/PPN based on assessed needs  - Assess need for intravenous fluids  - Provide specific nutrition/hydration education as appropriate  - Include patient/family/caregiver in decisions related to nutrition   Outcome: Progressing      Problem: DISCHARGE PLANNING - CARE MANAGEMENT  Goal: Discharge to post-acute care or home with appropriate resources  INTERVENTIONS:  - Conduct assessment to determine patient/family and health care team treatment goals, and need for post-acute services based on payer coverage, community resources, and patient preferences, and barriers to discharge  - Address psychosocial, clinical, and financial barriers to discharge as identified in assessment in conjunction with the patient/family and health care team  - Arrange appropriate level of post-acute services according to patients   needs and preference and payer coverage in collaboration with the physician and health care team  - Communicate with and update the patient/family, physician, and health care team regarding progress on the discharge plan  - Arrange appropriate transportation to post-acute venues   Outcome: Progressing

## 2017-10-22 LAB
ANION GAP SERPL CALCULATED.3IONS-SCNC: 9 MMOL/L (ref 4–13)
APTT PPP: 61 SECONDS (ref 23–35)
APTT PPP: 82 SECONDS (ref 23–35)
APTT PPP: 84 SECONDS (ref 23–35)
BACTERIA SPT RESP CULT: NORMAL
BUN SERPL-MCNC: 17 MG/DL (ref 5–25)
CALCIUM SERPL-MCNC: 8.9 MG/DL (ref 8.3–10.1)
CHLORIDE SERPL-SCNC: 110 MMOL/L (ref 100–108)
CO2 SERPL-SCNC: 24 MMOL/L (ref 21–32)
CREAT SERPL-MCNC: 1.06 MG/DL (ref 0.6–1.3)
ERYTHROCYTE [DISTWIDTH] IN BLOOD BY AUTOMATED COUNT: 13.1 % (ref 11.6–15.1)
GFR SERPL CREATININE-BSD FRML MDRD: 60 ML/MIN/1.73SQ M
GLUCOSE SERPL-MCNC: 122 MG/DL (ref 65–140)
GRAM STN SPEC: NORMAL
HCT VFR BLD AUTO: 31 % (ref 36.5–49.3)
HGB BLD-MCNC: 10.2 G/DL (ref 12–17)
MCH RBC QN AUTO: 31.4 PG (ref 26.8–34.3)
MCHC RBC AUTO-ENTMCNC: 32.9 G/DL (ref 31.4–37.4)
MCV RBC AUTO: 95 FL (ref 82–98)
PLATELET # BLD AUTO: 38 THOUSANDS/UL (ref 149–390)
PMV BLD AUTO: 10.9 FL (ref 8.9–12.7)
POTASSIUM SERPL-SCNC: 3.6 MMOL/L (ref 3.5–5.3)
RBC # BLD AUTO: 3.25 MILLION/UL (ref 3.88–5.62)
SODIUM SERPL-SCNC: 143 MMOL/L (ref 136–145)
WBC # BLD AUTO: 6.63 THOUSAND/UL (ref 4.31–10.16)

## 2017-10-22 PROCEDURE — 85027 COMPLETE CBC AUTOMATED: CPT | Performed by: FAMILY MEDICINE

## 2017-10-22 PROCEDURE — 80048 BASIC METABOLIC PNL TOTAL CA: CPT | Performed by: FAMILY MEDICINE

## 2017-10-22 PROCEDURE — 94640 AIRWAY INHALATION TREATMENT: CPT

## 2017-10-22 PROCEDURE — 94760 N-INVAS EAR/PLS OXIMETRY 1: CPT

## 2017-10-22 PROCEDURE — 94668 MNPJ CHEST WALL SBSQ: CPT

## 2017-10-22 PROCEDURE — 85730 THROMBOPLASTIN TIME PARTIAL: CPT | Performed by: FAMILY MEDICINE

## 2017-10-22 RX ORDER — LEVALBUTEROL 1.25 MG/.5ML
1.25 SOLUTION, CONCENTRATE RESPIRATORY (INHALATION)
Status: DISCONTINUED | OUTPATIENT
Start: 2017-10-22 | End: 2017-10-30

## 2017-10-22 RX ADMIN — GUAIFENESIN 600 MG: 600 TABLET, EXTENDED RELEASE ORAL at 21:42

## 2017-10-22 RX ADMIN — CEFEPIME HYDROCHLORIDE 2000 MG: 2 INJECTION, POWDER, FOR SOLUTION INTRAVENOUS at 21:42

## 2017-10-22 RX ADMIN — IPRATROPIUM BROMIDE 0.5 MG: 0.5 SOLUTION RESPIRATORY (INHALATION) at 15:08

## 2017-10-22 RX ADMIN — LEVALBUTEROL HYDROCHLORIDE 1.25 MG: 1.25 SOLUTION, CONCENTRATE RESPIRATORY (INHALATION) at 20:14

## 2017-10-22 RX ADMIN — CHOLECALCIFEROL TAB 25 MCG (1000 UNIT) 2000 UNITS: 25 TAB at 08:50

## 2017-10-22 RX ADMIN — DONEPEZIL HYDROCHLORIDE 10 MG: 5 TABLET ORAL at 21:42

## 2017-10-22 RX ADMIN — LEVALBUTEROL HYDROCHLORIDE 1.25 MG: 1.25 SOLUTION, CONCENTRATE RESPIRATORY (INHALATION) at 09:18

## 2017-10-22 RX ADMIN — MEMANTINE HYDROCHLORIDE 10 MG: 5 TABLET ORAL at 08:50

## 2017-10-22 RX ADMIN — CYANOCOBALAMIN TAB 500 MCG 250 MCG: 500 TAB at 08:49

## 2017-10-22 RX ADMIN — IPRATROPIUM BROMIDE 0.5 MG: 0.5 SOLUTION RESPIRATORY (INHALATION) at 20:14

## 2017-10-22 RX ADMIN — MEMANTINE HYDROCHLORIDE 10 MG: 5 TABLET ORAL at 18:06

## 2017-10-22 RX ADMIN — DORZOLAMIDE HYDROCHLORIDE 1 DROP: 20 SOLUTION/ DROPS OPHTHALMIC at 08:52

## 2017-10-22 RX ADMIN — METRONIDAZOLE 500 MG: 500 TABLET ORAL at 21:42

## 2017-10-22 RX ADMIN — ASPIRIN 81 MG 81 MG: 81 TABLET ORAL at 08:50

## 2017-10-22 RX ADMIN — METRONIDAZOLE 500 MG: 500 TABLET ORAL at 06:38

## 2017-10-22 RX ADMIN — GUAIFENESIN 600 MG: 600 TABLET, EXTENDED RELEASE ORAL at 08:51

## 2017-10-22 RX ADMIN — METRONIDAZOLE 500 MG: 500 TABLET ORAL at 13:45

## 2017-10-22 RX ADMIN — DORZOLAMIDE HYDROCHLORIDE 1 DROP: 20 SOLUTION/ DROPS OPHTHALMIC at 18:06

## 2017-10-22 RX ADMIN — IPRATROPIUM BROMIDE 0.5 MG: 0.5 SOLUTION RESPIRATORY (INHALATION) at 09:18

## 2017-10-22 RX ADMIN — CEFEPIME HYDROCHLORIDE 2000 MG: 2 INJECTION, POWDER, FOR SOLUTION INTRAVENOUS at 09:14

## 2017-10-22 RX ADMIN — ARGATROBAN 0.49 MCG/KG/MIN: 1 INJECTION INTRAVENOUS at 04:11

## 2017-10-22 RX ADMIN — BIMATOPROST 1 DROP: 0.1 SOLUTION/ DROPS OPHTHALMIC at 21:43

## 2017-10-22 RX ADMIN — LEVALBUTEROL HYDROCHLORIDE 1.25 MG: 1.25 SOLUTION, CONCENTRATE RESPIRATORY (INHALATION) at 15:08

## 2017-10-22 NOTE — PROGRESS NOTES
[facility] Progress Note  Patient: Michoacano Marshall 80 y o  male   MRN: [id number]  PCP: Judith Martinez DO  Unit/Bed#: [facility] Encounter: [id number]  Date Of Visit: 10/22/17    Assessment:    Principal Problem:    Pneumonia  Active Problems:    Lactic acidosis    Dementia    Glaucoma    Personal history of CLL (chronic lymphocytic leukemia)      Plan:    · Sepsis: This present on admission  This is secondary to likely aspiration pneumonia  Patient still insists on eating although and p o  is recommended  · History of CLL:  This is stable  · Dementia:  Stable  · Glaucoma:  Continue with the eyedrops  · Thrombocytopenia:  Rapid hit is negative  Awaiting serotonin assay  · Pneumonia probable Gram-negative  Continue cefepime and Flagyl  Vancomycin can be discontinued      VTE Pharmacologic Prophylaxis:   Pharmacologic: Argatroban  Mechanical VTE Prophylaxis in Place: Yes    Patient Centered Rounds: I have performed bedside rounds with nursing staff today  Education and Discussions with Family / Patient:  Discussed with patient  I will call the son    Time Spent for Care: 20 minutes  More than 50% of total time spent on counseling and coordination of care as described above  Current Length of Stay: 3 day(s)    Current Patient Status: Inpatient   Certification Statement: The patient will continue to require additional inpatient hospital stay due to Needing IV antibiotics and still being thrombocytopenic    Discharge Plan:  Not medically stable for discharge    Code Status: Level 3 - DNAR and DNI      Subjective:   Patient seen examined  Appears little short of breath but no complaints    Objective:     Vitals:   Temp (24hrs), Av 2 °F (36 8 °C), Min:97 9 °F (36 6 °C), Max:98 7 °F (37 1 °C)    HR:  [63-68] 68  Resp:  [17-20] 20  BP: (116-155)/(57-70) 155/70  SpO2:  [95 %-98 %] 95 %  Body mass index is 22 85 kg/m²  Input and Output Summary (last 24 hours):        Intake/Output Summary (Last 24 hours) at 10/22/17 1133  Last data filed at 10/22/17 0801   Gross per 24 hour   Intake              120 ml   Output              650 ml   Net             -530 ml       Physical Exam:     General Appearance:    Alert, cooperative, no distress, appears stated age                               Lungs: The scattered rhonchi       Heart:    Regular rate and rhythm, S1 and S2 normal, no murmur, rub    or gallop   Abdomen:     Soft, non-tender, bowel sounds active all four quadrants,     no masses, no organomegaly           Extremities:   Extremities normal, atraumatic, no cyanosis or edema                       Additional Data:     Labs:      Results from last 7 days  Lab Units 10/22/17  0412  10/19/17  1308   WBC Thousand/uL 6 63  < > 6 50   HEMOGLOBIN g/dL 10 2*  < > 12 5   HEMATOCRIT % 31 0*  < > 38 1   PLATELETS Thousands/uL 38*  < > 96*   LYMPHO PCT %  --   --  46*   MONO PCT MAN %  --   --  2*   EOSINO PCT MANUAL %  --   --  1   < > = values in this interval not displayed  Results from last 7 days  Lab Units 10/22/17  0412 10/21/17  1426   SODIUM mmol/L 143  --    POTASSIUM mmol/L 3 6  --    CHLORIDE mmol/L 110*  --    CO2 mmol/L 24  --    BUN mg/dL 17  --    CREATININE mg/dL 1 06  --    CALCIUM mg/dL 8 9  --    TOTAL PROTEIN g/dL  --  4 6*   BILIRUBIN TOTAL mg/dL  --  1 20*   ALK PHOS U/L  --  70   ALT U/L  --  19   AST U/L  --  30   GLUCOSE RANDOM mg/dL 122  --        Results from last 7 days  Lab Units 10/21/17  1426   INR  1 66*       * I Have Reviewed All Lab Data Listed Above  * Additional Pertinent Lab Tests Reviewed: [facility] Admission Reviewed        Recent Cultures (last 7 days):       Results from last 7 days  Lab Units 10/20/17  1321 10/19/17  2335 10/19/17  1316 10/19/17  1308   BLOOD CULTURE   --   --  No Growth at 48 hrs  No Growth at 48 hrs     SPUTUM CULTURE  1+ Growth of   --   --   --    GRAM STAIN RESULT  2+ Epithelial cells per low power field  1+ Polys  1+ Gram positive cocci in pairs  --   --   --    LEGIONELLA URINARY ANTIGEN   --  Negative  --   --        Last 24 Hours Medication List:     aspirin 81 mg Oral Daily   bimatoprost 1 drop Both Eyes HS   cefepime 2,000 mg Intravenous Q12H   cholecalciferol 2,000 Units Oral Daily   cyanocobalamin 250 mcg Oral Daily   donepezil 10 mg Oral HS   dorzolamide 1 drop Both Eyes BID   guaiFENesin 600 mg Oral Q12H SONY   ipratropium 0 5 mg Nebulization TID   levalbuterol 1 25 mg Nebulization TID   memantine 10 mg Oral BID   metroNIDAZOLE 500 mg Oral Q8H [address]   vancomycin 15 mg/kg Intravenous Q24H        Today, Patient Was Seen By: Francis Perez MD    ** Please Note: Dragon 360 Dictation voice to text software may have been used in the creation of this document   **

## 2017-10-23 ENCOUNTER — APPOINTMENT (INPATIENT)
Dept: RADIOLOGY | Facility: HOSPITAL | Age: 82
DRG: 871 | End: 2017-10-23
Payer: MEDICARE

## 2017-10-23 PROBLEM — D69.6 THROMBOCYTOPENIA (HCC): Status: ACTIVE | Noted: 2017-10-23

## 2017-10-23 LAB
ANION GAP SERPL CALCULATED.3IONS-SCNC: 7 MMOL/L (ref 4–13)
APTT PPP: 102 SECONDS (ref 23–35)
APTT PPP: 69 SECONDS (ref 23–35)
APTT PPP: 72 SECONDS (ref 23–35)
APTT PPP: 81 SECONDS (ref 23–35)
BUN SERPL-MCNC: 14 MG/DL (ref 5–25)
CALCIUM SERPL-MCNC: 9.1 MG/DL (ref 8.3–10.1)
CHLORIDE SERPL-SCNC: 109 MMOL/L (ref 100–108)
CO2 SERPL-SCNC: 25 MMOL/L (ref 21–32)
CREAT SERPL-MCNC: 0.97 MG/DL (ref 0.6–1.3)
ERYTHROCYTE [DISTWIDTH] IN BLOOD BY AUTOMATED COUNT: 12.9 % (ref 11.6–15.1)
GFR SERPL CREATININE-BSD FRML MDRD: 67 ML/MIN/1.73SQ M
GLUCOSE SERPL-MCNC: 117 MG/DL (ref 65–140)
HCT VFR BLD AUTO: 32.3 % (ref 36.5–49.3)
HGB BLD-MCNC: 10.5 G/DL (ref 12–17)
MCH RBC QN AUTO: 31.3 PG (ref 26.8–34.3)
MCHC RBC AUTO-ENTMCNC: 32.5 G/DL (ref 31.4–37.4)
MCV RBC AUTO: 96 FL (ref 82–98)
PLATELET # BLD AUTO: 46 THOUSANDS/UL (ref 149–390)
PMV BLD AUTO: 10.5 FL (ref 8.9–12.7)
POTASSIUM SERPL-SCNC: 3.6 MMOL/L (ref 3.5–5.3)
RBC # BLD AUTO: 3.36 MILLION/UL (ref 3.88–5.62)
SODIUM SERPL-SCNC: 141 MMOL/L (ref 136–145)
WBC # BLD AUTO: 5.73 THOUSAND/UL (ref 4.31–10.16)

## 2017-10-23 PROCEDURE — 71010 HB CHEST X-RAY 1 VIEW FRONTAL (PORTABLE): CPT

## 2017-10-23 PROCEDURE — 94640 AIRWAY INHALATION TREATMENT: CPT

## 2017-10-23 PROCEDURE — 94760 N-INVAS EAR/PLS OXIMETRY 1: CPT

## 2017-10-23 PROCEDURE — 80048 BASIC METABOLIC PNL TOTAL CA: CPT | Performed by: FAMILY MEDICINE

## 2017-10-23 PROCEDURE — 85730 THROMBOPLASTIN TIME PARTIAL: CPT | Performed by: FAMILY MEDICINE

## 2017-10-23 PROCEDURE — 94668 MNPJ CHEST WALL SBSQ: CPT

## 2017-10-23 PROCEDURE — 85027 COMPLETE CBC AUTOMATED: CPT | Performed by: FAMILY MEDICINE

## 2017-10-23 RX ORDER — FUROSEMIDE 10 MG/ML
20 INJECTION INTRAMUSCULAR; INTRAVENOUS ONCE
Status: COMPLETED | OUTPATIENT
Start: 2017-10-23 | End: 2017-10-23

## 2017-10-23 RX ADMIN — METRONIDAZOLE 500 MG: 500 TABLET ORAL at 06:25

## 2017-10-23 RX ADMIN — LEVALBUTEROL HYDROCHLORIDE 1.25 MG: 1.25 SOLUTION, CONCENTRATE RESPIRATORY (INHALATION) at 20:48

## 2017-10-23 RX ADMIN — CHOLECALCIFEROL TAB 25 MCG (1000 UNIT) 2000 UNITS: 25 TAB at 08:37

## 2017-10-23 RX ADMIN — ASPIRIN 81 MG 81 MG: 81 TABLET ORAL at 08:38

## 2017-10-23 RX ADMIN — MEMANTINE HYDROCHLORIDE 10 MG: 5 TABLET ORAL at 17:46

## 2017-10-23 RX ADMIN — IPRATROPIUM BROMIDE 0.5 MG: 0.5 SOLUTION RESPIRATORY (INHALATION) at 20:48

## 2017-10-23 RX ADMIN — IPRATROPIUM BROMIDE 0.5 MG: 0.5 SOLUTION RESPIRATORY (INHALATION) at 14:12

## 2017-10-23 RX ADMIN — LEVALBUTEROL HYDROCHLORIDE 1.25 MG: 1.25 SOLUTION, CONCENTRATE RESPIRATORY (INHALATION) at 07:25

## 2017-10-23 RX ADMIN — METRONIDAZOLE 500 MG: 500 TABLET ORAL at 21:11

## 2017-10-23 RX ADMIN — DORZOLAMIDE HYDROCHLORIDE 1 DROP: 20 SOLUTION/ DROPS OPHTHALMIC at 17:46

## 2017-10-23 RX ADMIN — METRONIDAZOLE 500 MG: 500 TABLET ORAL at 14:50

## 2017-10-23 RX ADMIN — DORZOLAMIDE HYDROCHLORIDE 1 DROP: 20 SOLUTION/ DROPS OPHTHALMIC at 08:38

## 2017-10-23 RX ADMIN — BIMATOPROST 1 DROP: 0.1 SOLUTION/ DROPS OPHTHALMIC at 21:12

## 2017-10-23 RX ADMIN — MEMANTINE HYDROCHLORIDE 10 MG: 5 TABLET ORAL at 08:37

## 2017-10-23 RX ADMIN — GUAIFENESIN 600 MG: 600 TABLET, EXTENDED RELEASE ORAL at 21:11

## 2017-10-23 RX ADMIN — ARGATROBAN 0.5 MCG/KG/MIN: 1 INJECTION INTRAVENOUS at 01:23

## 2017-10-23 RX ADMIN — IPRATROPIUM BROMIDE 0.5 MG: 0.5 SOLUTION RESPIRATORY (INHALATION) at 07:25

## 2017-10-23 RX ADMIN — FUROSEMIDE 20 MG: 10 INJECTION, SOLUTION INTRAMUSCULAR; INTRAVENOUS at 10:55

## 2017-10-23 RX ADMIN — DONEPEZIL HYDROCHLORIDE 10 MG: 5 TABLET ORAL at 21:11

## 2017-10-23 RX ADMIN — CEFEPIME HYDROCHLORIDE 2000 MG: 2 INJECTION, POWDER, FOR SOLUTION INTRAVENOUS at 08:39

## 2017-10-23 RX ADMIN — GUAIFENESIN 600 MG: 600 TABLET, EXTENDED RELEASE ORAL at 08:37

## 2017-10-23 RX ADMIN — CEFEPIME HYDROCHLORIDE 2000 MG: 2 INJECTION, POWDER, FOR SOLUTION INTRAVENOUS at 21:32

## 2017-10-23 RX ADMIN — CYANOCOBALAMIN TAB 500 MCG 250 MCG: 500 TAB at 08:38

## 2017-10-23 RX ADMIN — LEVALBUTEROL HYDROCHLORIDE 1.25 MG: 1.25 SOLUTION, CONCENTRATE RESPIRATORY (INHALATION) at 14:12

## 2017-10-23 NOTE — CASE MANAGEMENT
Continued Stay Review    Date: 10/23/17    inpatient    Vital Signs: /73   Pulse 59   Temp 97 9 °F (36 6 °C) (Oral)   Resp 19   Ht 6' 2" (1 88 m)   Wt 80 7 kg (178 lb)   SpO2 92%   BMI 22 85 kg/m²     Medications:   Scheduled Meds:   aspirin 81 mg Oral Daily   bimatoprost 1 drop Both Eyes HS   cefepime 2,000 mg Intravenous Q12H   cholecalciferol 2,000 Units Oral Daily   cyanocobalamin 250 mcg Oral Daily   donepezil 10 mg Oral HS   dorzolamide 1 drop Both Eyes BID   guaiFENesin 600 mg Oral Q12H SONY   ipratropium 0 5 mg Nebulization TID   levalbuterol 1 25 mg Nebulization TID   memantine 10 mg Oral BID   metroNIDAZOLE 500 mg Oral Q8H [address]     Continuous Infusions:   argatroban 0 05-3 mcg/kg/min Last Rate: 0 25 mcg/kg/min (10/23/17 0554)     PRN Meds:   Acetaminophen    Abnormal Labs/Diagnostic Results:   10/23: cl 109   hgb 10 5   hct 32 3   Plate 46   ptt 258, 69, 81    10/23 PCXR: Stable right-sided infiltrates, most compatible with multifocal/multi lobar pneumonia  Age/Sex: 80 y o  male     · Assessment/Plan: Pneumonia:  Probable Gram-negative  Continue with cefepime and Flagyl  Vancomycin was discontinued secondary to the MRSA nasal swab being negative  · Sepsis present on admission:  Stable  · Thrombocytopenia  The patient came in thrombocytopenic but it did drop to the 30s  The rapid HI T is negative  The serotonin as a has been sent and until that comes back I have put the patient on argatroban  May need to consider Hematology consultation  Platelets are starting to recover  This could also be secondary to acute infection  · Glaucoma:  Continue with the eyedrops  · History of CLL:  Stable  · Lactic acidosis: This has resolved  · Dementia:  Patient appears to be at his baseline  Shortness of breath: The patient is likely aspirating    He was seen by speech therapy and is at high risk for aspiration and they recommended NPO however the family still wants the patient to eat although the know the the risks after discussion with speech therapy  I will make sure the patient is not volume overloaded  I will given 20 mg of IV Lasix and she does have some crackles at the bases and get a chest x-ray  Certification Statement: The patient will continue to require additional inpatient hospital stay due to Still being short of breath and thrombocytopenic needing the serotonin assay to come back     Discharge Plan:  Anticipate patient will be here at least till Wednesday or Thursday    Patient needs IV antibiotics and his platelet counts to recover and also needs the heparin serotonin assay to come back which should being hopefully tomorrow

## 2017-10-23 NOTE — PLAN OF CARE
DISCHARGE PLANNING     Discharge to home or other facility with appropriate resources Not Progressing        DISCHARGE PLANNING - CARE MANAGEMENT     Discharge to post-acute care or home with appropriate resources Not Progressing        HEMATOLOGIC - ADULT     Maintains hematologic stability Not Progressing        Knowledge Deficit     Patient/family/caregiver demonstrates understanding of disease process, treatment plan, medications, and discharge instructions Not Progressing        MUSCULOSKELETAL - ADULT     Maintain or return mobility to safest level of function Not Progressing        NEUROSENSORY - ADULT     Achieves stable or improved neurological status Not Progressing        Nutrition/Hydration-ADULT     Nutrient/Hydration intake appropriate for improving, restoring or maintaining nutritional needs Not Progressing        Potential for Falls     Patient will remain free of falls Not Progressing        Prexisting or High Potential for Compromised Skin Integrity     Skin integrity is maintained or improved Not Progressing        RESPIRATORY - ADULT     Achieves optimal ventilation and oxygenation Not Progressing        SAFETY ADULT     Maintain or return to baseline ADL function Not Progressing     Maintain or return mobility status to optimal level Not Progressing        SKIN/TISSUE INTEGRITY - ADULT     Skin integrity remains intact Not Progressing     Incision(s), wounds(s) or drain site(s) healing without S/S of infection Not Progressing          DISCHARGE PLANNING     Discharge to home or other facility with appropriate resources Progressing        DISCHARGE PLANNING - CARE MANAGEMENT     Discharge to post-acute care or home with appropriate resources Progressing        HEMATOLOGIC - ADULT     Maintains hematologic stability Progressing        Knowledge Deficit     Patient/family/caregiver demonstrates understanding of disease process, treatment plan, medications, and discharge instructions Progressing        MUSCULOSKELETAL - ADULT     Maintain or return mobility to safest level of function Progressing        NEUROSENSORY - ADULT     Achieves stable or improved neurological status Progressing        Nutrition/Hydration-ADULT     Nutrient/Hydration intake appropriate for improving, restoring or maintaining nutritional needs Progressing        Potential for Falls     Patient will remain free of falls Progressing        Prexisting or High Potential for Compromised Skin Integrity     Skin integrity is maintained or improved Progressing        RESPIRATORY - ADULT     Achieves optimal ventilation and oxygenation Progressing        SAFETY ADULT     Maintain or return to baseline ADL function Progressing     Maintain or return mobility status to optimal level Progressing        SKIN/TISSUE INTEGRITY - ADULT     Skin integrity remains intact Progressing     Incision(s), wounds(s) or drain site(s) healing without S/S of infection Progressing

## 2017-10-23 NOTE — PROGRESS NOTES
Progress Note - Pulmonary   Maylon Ripper 80 y o  male MRN: [id number]  Unit/Bed#: -01 Encounter: [id number]    Assessment:  1  Pneumonia - HCAP vs aspiration    Plan:  1  Pneumonia - HCAP vs aspiration  - CT scan done shows RML and RLL patchy airspace opacities worrysome for infiltrates  Mild ground glass opacity RUL  - Aspiration pneuomonia vs HCAP - Repeat swallow eval still shows high aspiration risk with all consistencies  Family and patient insist on him taking PO - continue with puree and nectar thick liquids as safest option  - Today is day 5/7 antibiotics - cefepime and flagyl, vanc stopped given MRSA swab negative  - He is afebrile since 10/19, no leukocytosis, did have elevated lactic but that has cleared with some IV fluids  - Continue ipratropium nebs and mucinex to help with mucous clearance  - Will need follow up imaging in 6-8 weeks      Subjective:   Patient resting in bed, denies any shortness of breath, does still have a cough  Objective:     Vitals: Blood pressure 161/73, pulse 59, temperature 97 9 °F (36 6 °C), temperature source Oral, resp  rate 19, height 6' 2" (1 88 m), weight 80 7 kg (178 lb), SpO2 99 %  ,Body mass index is 22 85 kg/m²  Intake/Output Summary (Last 24 hours) at 10/23/17 1206  Last data filed at 10/23/17 1101   Gross per 24 hour   Intake                0 ml   Output              300 ml   Net             -300 ml       Invasive Devices     Peripheral Intravenous Line            Peripheral IV 10/23/17 Left Arm less than 1 day    Peripheral IV 10/23/17 Right Antecubital less than 1 day                Physical Exam: General appearance: alert, appears stated age and cooperative  Lungs: Scattered rhonchi bilaterally  Heart: regular rate and rhythm and S1, S2 normal  Extremities: extremities normal, atraumatic, no cyanosis or edema  Neurologic: Mental status: alertness: alert, orientation: person     Labs: I have personally reviewed pertinent lab results  , CBC:   Lab Results   Component Value Date    WBC 5 73 10/23/2017    HGB 10 5 (L) 10/23/2017    HCT 32 3 (L) 10/23/2017    MCV 96 10/23/2017    PLT 46 (LL) 10/23/2017    MCH 31 3 10/23/2017    MCHC 32 5 10/23/2017    RDW 12 9 10/23/2017    MPV 10 5 10/23/2017   , CMP:   Lab Results   Component Value Date     10/23/2017    K 3 6 10/23/2017     (H) 10/23/2017    CO2 25 10/23/2017    ANIONGAP 7 10/23/2017    BUN 14 10/23/2017    CREATININE 0 97 10/23/2017    GLUCOSE 117 10/23/2017    CALCIUM 9 1 10/23/2017    EGFR 67 10/23/2017     Imaging and other studies: I have personally reviewed pertinent reports     and I have personally reviewed pertinent films in PACS

## 2017-10-23 NOTE — PROGRESS NOTES
[facility] Progress Note  Patient: Dolores White 80 y o  male   MRN: [id number]  PCP: Shae Cronin DO  Unit/Bed#: -[facility] Encounter: [id number]  Date Of Visit: 10/23/17    Assessment:    Principal Problem:    Pneumonia  Active Problems:    Lactic acidosis    Dementia    Glaucoma    Personal history of CLL (chronic lymphocytic leukemia)    Thrombocytopenia (HCC)      Plan:    · Pneumonia:  Probable Gram-negative  Continue with cefepime and Flagyl  Vancomycin was discontinued secondary to the MRSA nasal swab being negative  · Sepsis present on admission:  Stable  · Thrombocytopenia  The patient came in thrombocytopenic but it did drop to the 30s  The rapid HI T is negative  The serotonin as a has been sent and until that comes back I have put the patient on argatroban  May need to consider Hematology consultation  Platelets are starting to recover  This could also be secondary to acute infection  · Glaucoma:  Continue with the eyedrops  · History of CLL:  Stable  · Lactic acidosis: This has resolved  · Dementia:  Patient appears to be at his baseline  · Shortness of breath: The patient is likely aspirating  He was seen by speech therapy and is at high risk for aspiration and they recommended NPO however the family still wants the patient to eat although the know the the risks after discussion with speech therapy  I will make sure the patient is not volume overloaded  I will given 20 mg of IV Lasix and she does have some crackles at the bases and get a chest x-ray  VTE Pharmacologic Prophylaxis:   Pharmacologic: Argatroban  Mechanical VTE Prophylaxis in Place: Yes    Patient Centered Rounds: I have performed bedside rounds with nursing staff today  Discussions with Specialists or Other Care Team Provider:  Nursing at the bedside  Education and Discussions with Family / Patient: called the son  And left a message    Time Spent for Care: 20 minutes    More than 50% of total time spent on counseling and coordination of care as described above  Current Length of Stay: 4 day(s)    Current Patient Status: Inpatient   Certification Statement: The patient will continue to require additional inpatient hospital stay due to Still being short of breath and thrombocytopenic needing the serotonin assay to come back    Discharge Plan:  Anticipate patient will be here at least till Wednesday or Thursday  Patient needs IV antibiotics and his platelet counts to recover and also needs the heparin serotonin assay to come back which should being hopefully tomorrow    Code Status: Level 3 - DNAR and DNI      Subjective:   Patient seen examined  Appears little short of breath this morning  Objective:     Vitals:   Temp (24hrs), Av 6 °F (36 4 °C), Min:97 4 °F (36 3 °C), Max:97 9 °F (36 6 °C)    HR:  [59-66] 59  Resp:  [18-19] 19  BP: (118-161)/(58-73) 161/73  SpO2:  [94 %-100 %] 99 %  Body mass index is 22 85 kg/m²  Input and Output Summary (last 24 hours):        Intake/Output Summary (Last 24 hours) at 10/23/17 1038  Last data filed at 10/23/17 0141   Gross per 24 hour   Intake                0 ml   Output              100 ml   Net             -100 ml       Physical Exam:     General Appearance:    Alert, cooperative, no distress, appears stated age                               Lungs:   Scattered rhonchi noted as well as some crackles at the bases       Heart:    Regular rate and rhythm, S1 and S2 normal, no murmur, rub    or gallop   Abdomen:     Soft, non-tender, bowel sounds active all four quadrants,     no masses, no organomegaly           Extremities:   Extremities normal, atraumatic, no cyanosis or edema                       Additional Data:     Labs:      Results from last 7 days  Lab Units 10/23/17  0637  10/19/17  1308   WBC Thousand/uL 5 73  < > 6 50   HEMOGLOBIN g/dL 10 5*  < > 12 5   HEMATOCRIT % 32 3*  < > 38 1   PLATELETS Thousands/uL 46*  < > 96*   LYMPHO PCT %  --   -- 46*   MONO PCT MAN %  --   --  2*   EOSINO PCT MANUAL %  --   --  1   < > = values in this interval not displayed  Results from last 7 days  Lab Units 10/23/17  0637  10/21/17  1426   SODIUM mmol/L 141  < >  --    POTASSIUM mmol/L 3 6  < >  --    CHLORIDE mmol/L 109*  < >  --    CO2 mmol/L 25  < >  --    BUN mg/dL 14  < >  --    CREATININE mg/dL 0 97  < >  --    CALCIUM mg/dL 9 1  < >  --    TOTAL PROTEIN g/dL  --   --  4 6*   BILIRUBIN TOTAL mg/dL  --   --  1 20*   ALK PHOS U/L  --   --  70   ALT U/L  --   --  19   AST U/L  --   --  30   GLUCOSE RANDOM mg/dL 117  < >  --    < > = values in this interval not displayed  Results from last 7 days  Lab Units 10/21/17  1426   INR  1 66*       * I Have Reviewed All Lab Data Listed Above  * Additional Pertinent Lab Tests Reviewed: [facility]        Recent Cultures (last 7 days):       Results from last 7 days  Lab Units 10/20/17  1321 10/19/17  2335 10/19/17  1316 10/19/17  1308   BLOOD CULTURE   --   --  No Growth at 72 hrs  No Growth at 72 hrs  SPUTUM CULTURE  1+ Growth of   --   --   --    GRAM STAIN RESULT  2+ Epithelial cells per low power field  1+ Polys  1+ Gram positive cocci in pairs  --   --   --    LEGIONELLA URINARY ANTIGEN   --  Negative  --   --        Last 24 Hours Medication List:     aspirin 81 mg Oral Daily   bimatoprost 1 drop Both Eyes HS   cefepime 2,000 mg Intravenous Q12H   cholecalciferol 2,000 Units Oral Daily   cyanocobalamin 250 mcg Oral Daily   donepezil 10 mg Oral HS   dorzolamide 1 drop Both Eyes BID   furosemide 20 mg Intravenous Once   guaiFENesin 600 mg Oral Q12H [address]   ipratropium 0 5 mg Nebulization TID   levalbuterol 1 25 mg Nebulization TID   memantine 10 mg Oral BID   metroNIDAZOLE 500 mg Oral Q8H [address]        Today, Patient Was Seen By: Francis Perez MD    ** Please Note: Dragon 360 Dictation voice to text software may have been used in the creation of this document   **

## 2017-10-24 LAB
ANION GAP SERPL CALCULATED.3IONS-SCNC: 7 MMOL/L (ref 4–13)
APTT PPP: 73 SECONDS (ref 23–35)
BACTERIA BLD CULT: NORMAL
BACTERIA BLD CULT: NORMAL
BUN SERPL-MCNC: 16 MG/DL (ref 5–25)
CALCIUM SERPL-MCNC: 9.3 MG/DL (ref 8.3–10.1)
CHLORIDE SERPL-SCNC: 108 MMOL/L (ref 100–108)
CO2 SERPL-SCNC: 28 MMOL/L (ref 21–32)
CREAT SERPL-MCNC: 1.05 MG/DL (ref 0.6–1.3)
ERYTHROCYTE [DISTWIDTH] IN BLOOD BY AUTOMATED COUNT: 13 % (ref 11.6–15.1)
GFR SERPL CREATININE-BSD FRML MDRD: 60 ML/MIN/1.73SQ M
GLUCOSE SERPL-MCNC: 119 MG/DL (ref 65–140)
HCT VFR BLD AUTO: 33.3 % (ref 36.5–49.3)
HGB BLD-MCNC: 10.9 G/DL (ref 12–17)
MCH RBC QN AUTO: 31.2 PG (ref 26.8–34.3)
MCHC RBC AUTO-ENTMCNC: 32.7 G/DL (ref 31.4–37.4)
MCV RBC AUTO: 95 FL (ref 82–98)
NT-PROBNP SERPL-MCNC: 837 PG/ML
PLATELET # BLD AUTO: 64 THOUSANDS/UL (ref 149–390)
PMV BLD AUTO: 10.7 FL (ref 8.9–12.7)
POTASSIUM SERPL-SCNC: 3.8 MMOL/L (ref 3.5–5.3)
RBC # BLD AUTO: 3.49 MILLION/UL (ref 3.88–5.62)
SODIUM SERPL-SCNC: 143 MMOL/L (ref 136–145)
WBC # BLD AUTO: 5.3 THOUSAND/UL (ref 4.31–10.16)

## 2017-10-24 PROCEDURE — 94668 MNPJ CHEST WALL SBSQ: CPT

## 2017-10-24 PROCEDURE — 94640 AIRWAY INHALATION TREATMENT: CPT

## 2017-10-24 PROCEDURE — 94760 N-INVAS EAR/PLS OXIMETRY 1: CPT

## 2017-10-24 PROCEDURE — 83880 ASSAY OF NATRIURETIC PEPTIDE: CPT | Performed by: FAMILY MEDICINE

## 2017-10-24 PROCEDURE — 85730 THROMBOPLASTIN TIME PARTIAL: CPT | Performed by: FAMILY MEDICINE

## 2017-10-24 PROCEDURE — 85027 COMPLETE CBC AUTOMATED: CPT | Performed by: FAMILY MEDICINE

## 2017-10-24 PROCEDURE — 80048 BASIC METABOLIC PNL TOTAL CA: CPT | Performed by: FAMILY MEDICINE

## 2017-10-24 RX ADMIN — LEVALBUTEROL HYDROCHLORIDE 1.25 MG: 1.25 SOLUTION, CONCENTRATE RESPIRATORY (INHALATION) at 20:24

## 2017-10-24 RX ADMIN — CHOLECALCIFEROL TAB 25 MCG (1000 UNIT) 2000 UNITS: 25 TAB at 09:41

## 2017-10-24 RX ADMIN — IPRATROPIUM BROMIDE 0.5 MG: 0.5 SOLUTION RESPIRATORY (INHALATION) at 07:44

## 2017-10-24 RX ADMIN — METRONIDAZOLE 500 MG: 500 TABLET ORAL at 05:08

## 2017-10-24 RX ADMIN — CYANOCOBALAMIN TAB 500 MCG 250 MCG: 500 TAB at 09:42

## 2017-10-24 RX ADMIN — BIMATOPROST 1 DROP: 0.1 SOLUTION/ DROPS OPHTHALMIC at 21:03

## 2017-10-24 RX ADMIN — DONEPEZIL HYDROCHLORIDE 10 MG: 5 TABLET ORAL at 21:02

## 2017-10-24 RX ADMIN — LEVALBUTEROL HYDROCHLORIDE 1.25 MG: 1.25 SOLUTION, CONCENTRATE RESPIRATORY (INHALATION) at 07:44

## 2017-10-24 RX ADMIN — DORZOLAMIDE HYDROCHLORIDE 1 DROP: 20 SOLUTION/ DROPS OPHTHALMIC at 17:24

## 2017-10-24 RX ADMIN — METRONIDAZOLE 500 MG: 500 TABLET ORAL at 21:02

## 2017-10-24 RX ADMIN — CEFEPIME HYDROCHLORIDE 2000 MG: 2 INJECTION, POWDER, FOR SOLUTION INTRAVENOUS at 09:54

## 2017-10-24 RX ADMIN — ASPIRIN 81 MG 81 MG: 81 TABLET ORAL at 09:42

## 2017-10-24 RX ADMIN — LEVALBUTEROL HYDROCHLORIDE 1.25 MG: 1.25 SOLUTION, CONCENTRATE RESPIRATORY (INHALATION) at 13:31

## 2017-10-24 RX ADMIN — IPRATROPIUM BROMIDE 0.5 MG: 0.5 SOLUTION RESPIRATORY (INHALATION) at 13:31

## 2017-10-24 RX ADMIN — MEMANTINE HYDROCHLORIDE 10 MG: 5 TABLET ORAL at 17:24

## 2017-10-24 RX ADMIN — GUAIFENESIN 600 MG: 600 TABLET, EXTENDED RELEASE ORAL at 09:41

## 2017-10-24 RX ADMIN — GUAIFENESIN 600 MG: 600 TABLET, EXTENDED RELEASE ORAL at 21:02

## 2017-10-24 RX ADMIN — IPRATROPIUM BROMIDE 0.5 MG: 0.5 SOLUTION RESPIRATORY (INHALATION) at 20:24

## 2017-10-24 RX ADMIN — ARGATROBAN 0.25 MCG/KG/MIN: 1 INJECTION INTRAVENOUS at 21:21

## 2017-10-24 RX ADMIN — MEMANTINE HYDROCHLORIDE 10 MG: 5 TABLET ORAL at 09:42

## 2017-10-24 RX ADMIN — CEFEPIME HYDROCHLORIDE 2000 MG: 2 INJECTION, POWDER, FOR SOLUTION INTRAVENOUS at 21:03

## 2017-10-24 RX ADMIN — DORZOLAMIDE HYDROCHLORIDE 1 DROP: 20 SOLUTION/ DROPS OPHTHALMIC at 09:51

## 2017-10-24 RX ADMIN — METRONIDAZOLE 500 MG: 500 TABLET ORAL at 14:50

## 2017-10-24 NOTE — SOCIAL WORK
call from caregivers that they are current w pt  referral sent for [facility]   however, awaiting d c plan---pending progress

## 2017-10-25 PROBLEM — L89.303 DECUBITUS ULCER OF BUTTOCK, STAGE 3 (HCC): Status: ACTIVE | Noted: 2017-10-25

## 2017-10-25 PROBLEM — A41.9 SEPSIS (HCC): Status: ACTIVE | Noted: 2017-10-25

## 2017-10-25 PROBLEM — E87.2 LACTIC ACIDOSIS: Status: RESOLVED | Noted: 2017-10-19 | Resolved: 2017-10-25

## 2017-10-25 LAB
ANION GAP SERPL CALCULATED.3IONS-SCNC: 7 MMOL/L (ref 4–13)
APTT PPP: 70 SECONDS (ref 23–35)
APTT PPP: 78 SECONDS (ref 23–35)
BUN SERPL-MCNC: 17 MG/DL (ref 5–25)
CALCIUM SERPL-MCNC: 9 MG/DL (ref 8.3–10.1)
CHLORIDE SERPL-SCNC: 108 MMOL/L (ref 100–108)
CO2 SERPL-SCNC: 28 MMOL/L (ref 21–32)
CREAT SERPL-MCNC: 0.95 MG/DL (ref 0.6–1.3)
GFR SERPL CREATININE-BSD FRML MDRD: 68 ML/MIN/1.73SQ M
GLUCOSE SERPL-MCNC: 115 MG/DL (ref 65–140)
POTASSIUM SERPL-SCNC: 3.4 MMOL/L (ref 3.5–5.3)
SODIUM SERPL-SCNC: 143 MMOL/L (ref 136–145)

## 2017-10-25 PROCEDURE — 85730 THROMBOPLASTIN TIME PARTIAL: CPT | Performed by: INTERNAL MEDICINE

## 2017-10-25 PROCEDURE — 97110 THERAPEUTIC EXERCISES: CPT

## 2017-10-25 PROCEDURE — 94760 N-INVAS EAR/PLS OXIMETRY 1: CPT

## 2017-10-25 PROCEDURE — 97530 THERAPEUTIC ACTIVITIES: CPT

## 2017-10-25 PROCEDURE — 92526 ORAL FUNCTION THERAPY: CPT

## 2017-10-25 PROCEDURE — 80048 BASIC METABOLIC PNL TOTAL CA: CPT | Performed by: INTERNAL MEDICINE

## 2017-10-25 PROCEDURE — 94668 MNPJ CHEST WALL SBSQ: CPT

## 2017-10-25 PROCEDURE — 94640 AIRWAY INHALATION TREATMENT: CPT

## 2017-10-25 RX ADMIN — BIMATOPROST 1 DROP: 0.1 SOLUTION/ DROPS OPHTHALMIC at 21:50

## 2017-10-25 RX ADMIN — IPRATROPIUM BROMIDE 0.5 MG: 0.5 SOLUTION RESPIRATORY (INHALATION) at 21:09

## 2017-10-25 RX ADMIN — MEMANTINE HYDROCHLORIDE 10 MG: 5 TABLET ORAL at 18:42

## 2017-10-25 RX ADMIN — LEVALBUTEROL HYDROCHLORIDE 1.25 MG: 1.25 SOLUTION, CONCENTRATE RESPIRATORY (INHALATION) at 14:59

## 2017-10-25 RX ADMIN — DONEPEZIL HYDROCHLORIDE 10 MG: 5 TABLET ORAL at 21:49

## 2017-10-25 RX ADMIN — CEFEPIME HYDROCHLORIDE 2000 MG: 2 INJECTION, POWDER, FOR SOLUTION INTRAVENOUS at 11:15

## 2017-10-25 RX ADMIN — MEMANTINE HYDROCHLORIDE 10 MG: 5 TABLET ORAL at 11:11

## 2017-10-25 RX ADMIN — DORZOLAMIDE HYDROCHLORIDE 1 DROP: 20 SOLUTION/ DROPS OPHTHALMIC at 11:12

## 2017-10-25 RX ADMIN — LEVALBUTEROL HYDROCHLORIDE 1.25 MG: 1.25 SOLUTION, CONCENTRATE RESPIRATORY (INHALATION) at 08:27

## 2017-10-25 RX ADMIN — METRONIDAZOLE 500 MG: 500 TABLET ORAL at 05:44

## 2017-10-25 RX ADMIN — CYANOCOBALAMIN TAB 500 MCG 250 MCG: 500 TAB at 11:11

## 2017-10-25 RX ADMIN — IPRATROPIUM BROMIDE 0.5 MG: 0.5 SOLUTION RESPIRATORY (INHALATION) at 08:27

## 2017-10-25 RX ADMIN — METRONIDAZOLE 500 MG: 500 TABLET ORAL at 21:49

## 2017-10-25 RX ADMIN — CEFEPIME HYDROCHLORIDE 2000 MG: 2 INJECTION, POWDER, FOR SOLUTION INTRAVENOUS at 21:49

## 2017-10-25 RX ADMIN — GUAIFENESIN 600 MG: 600 TABLET, EXTENDED RELEASE ORAL at 21:49

## 2017-10-25 RX ADMIN — GUAIFENESIN 600 MG: 600 TABLET, EXTENDED RELEASE ORAL at 11:11

## 2017-10-25 RX ADMIN — METRONIDAZOLE 500 MG: 500 TABLET ORAL at 15:11

## 2017-10-25 RX ADMIN — LEVALBUTEROL HYDROCHLORIDE 1.25 MG: 1.25 SOLUTION, CONCENTRATE RESPIRATORY (INHALATION) at 21:09

## 2017-10-25 RX ADMIN — DORZOLAMIDE HYDROCHLORIDE 1 DROP: 20 SOLUTION/ DROPS OPHTHALMIC at 18:42

## 2017-10-25 RX ADMIN — ASPIRIN 81 MG 81 MG: 81 TABLET ORAL at 11:11

## 2017-10-25 RX ADMIN — IPRATROPIUM BROMIDE 0.5 MG: 0.5 SOLUTION RESPIRATORY (INHALATION) at 14:59

## 2017-10-25 RX ADMIN — CHOLECALCIFEROL TAB 25 MCG (1000 UNIT) 2000 UNITS: 25 TAB at 11:11

## 2017-10-25 NOTE — ASSESSMENT & PLAN NOTE
Assessment/plan:  · Patient currently on argatroban protocol  · Heparin antibody serotonin screen pending rapid screen was negative    · If serotonin screen positive will consult Hematology  · Continue to monitor platelet count

## 2017-10-25 NOTE — PLAN OF CARE
Problem: SLP ADULT - SWALLOWING, IMPAIRED  Goal: Advance to least restrictive diet without signs or symptoms of aspiration for planned discharge setting  See evaluation for individualized goals  Patient and family will demonstrate strategies to minimize risk of aspiration from PO intake x100% during PO intake     Outcome: Adequate for Discharge

## 2017-10-25 NOTE — ASSESSMENT & PLAN NOTE
Assessment/Plan:  Likely gram-negative anaerobic associated pneumonia    Day number 6 of cefepime and Flagyl  · Continue antibiotic therapy for now, total 7-10 days based on clinical exam   · Continue aggressive pulmonary toilet with Xopenex, Atrovent and Mucinex  · The chest x-ray on 10/23/2017 shows persistent infiltrate

## 2017-10-25 NOTE — SPEECH THERAPY NOTE
SLP  Note  Patient Name: Tad VILLEGASQ Date: 10/25/2017     Subjective:  Pt seated upright out of bed in chair with lunch tray, A+A, pleasantly confused  Intermittent rough, congested cough at baseline  Objective:  Nectar-thick liquids via cup with prolonged oral manipulation, suspected premature spillage, delayed and weak hyolaryngeal elevation, +immediate wet cough after ~50% of boluses  Puree with prolonged oral manipulation, piecemeal transfer, delayed and weak hyolaryngeal elevation, +delayed throat clear after ~25% of boluses  Assessment:  Pt p/w s/sx aspiration with PO intake and remains at high risk for aspiration with all PO intake  However, given that the patient and family have elected to continue PO intake despite the risks of aspiration and pneumonia, a Dysphagia 1/Puree and Nectar-Thick Liquid diet would entail less risk of aspiration than other diet textures  Plan/Recommendations:  Continue current Dysphagia 1/Puree and Nectar-Thick Liquid diet with aspiration precautions  D/C ST, no further needs at this time  Please reconsult with any questions or concerns

## 2017-10-25 NOTE — ASSESSMENT & PLAN NOTE
Assessment/Plan:  Sepsis secondary to gram-negative pneumonia currently resolved:  · Continue treatment of pneumonia

## 2017-10-25 NOTE — PHYSICAL THERAPY NOTE
Physical Therapy Treatment Note       10/25/17 3114   Pain Assessment   Pain Assessment No/denies pain   Pain Score No Pain   Restrictions/Precautions   Weight Bearing Precautions Per Order No   Braces or Orthoses (none per chart review)   Other Precautions Aspiration; Fall Risk;Multiple lines;Hard of hearing;Visual impairment; Chair Alarm; Bed Alarm;Cognitive   General   Chart Reviewed Yes   Response to Previous Treatment Patient with no complaints from previous session  Family/Caregiver Present No   Cognition   Overall Cognitive Status Impaired   Arousal/Participation Alert; Responsive   Attention Attends with cues to redirect   Orientation Level Oriented to person;Oriented to place; Disoriented to time;Disoriented to situation   Memory Decreased recall of biographical information;Decreased short term memory;Decreased recall of recent events   Following Commands Follows one step commands with increased time or repetition   Comments pt agreeable to PT session   Subjective   Subjective "I feel pretty good today"   Bed Mobility   Supine to Sit 3  Moderate assistance  (log roll technique)   Additional items Assist x 2;HOB elevated; Bedrails; Increased time required;Verbal cues;LE management   Sit to Supine Unable to assess  (pt remained OOB in recliner post session, chair alarm on)   Transfers   Sit to Stand 3  Moderate assistance   Additional items Assist x 2;Armrests; Increased time required;Verbal cues  (bed height elevated, retropulsive initially in stance)   Stand to Sit 3  Moderate assistance   Additional items Assist x 2;Armrests; Increased time required;Verbal cues   Ambulation/Elevation   Gait pattern Decreased foot clearance;Shuffling; Wide ZHOU;Retropulsion; Short stride; Excessively slow  (B knee flexion)   Gait Assistance 3  Moderate assist   Additional items Assist x 2;Verbal cues; Tactile cues  (for RW management)   Assistive Device Rolling walker   Distance 5' from EOB to recliner   Stair Management Assistance Not tested   Balance   Static Sitting Fair +   Dynamic Sitting Fair   Static Standing Poor +   Dynamic Standing Poor   Ambulatory Poor   Endurance Deficit   Endurance Deficit Yes   Activity Tolerance   Activity Tolerance Patient limited by fatigue;Treatment limited secondary to medical complications (Comment)  (baseline dementia)   Medical Staff Made Aware up with A order  spoke to Perry Johnson concerning PT recs  Nurse Made Aware Yes, RN Chaim verbalized pt appropriate for PT session, made aware of outcomes  RN cleared pt for OOB activity- noted caution 2* argatroban infusion but noted no contraindications to mobilize pt OOB at this time  Exercises   Hip Abduction Sitting;5 reps;AROM; Bilateral   Knee AROM Long Arc Quad Sitting;5 reps;AROM; Bilateral   Ankle Pumps Sitting;5 reps;AROM; Bilateral   Marching Sitting;5 reps;AROM; Bilateral   Assessment   Prognosis Guarded   Problem List Decreased strength;Decreased endurance; Impaired balance;Decreased mobility; Decreased cognition;Decreased safety awareness; Impaired vision; Impaired hearing   Assessment Pt seen for PT treatment session this date with interventions consisting of Therapeutic exercise consisting of: AROM 5 B LE in sitting position and therapeutic activity consisting of training: supine<>sit transfers, sit<>stand transfers, vc and tactile cues for static standing posture faciliation and short trial of ambulation from EOB to recliner for 5' distance  Pt agreeable to PT treatment session upon arrival, pt found supine in bed w/ HOB elevated, in no apparent distress, A&O x 2 and responsive  In comparison to previous session, pt with improvements in initiation of B LE Abdelrahman, still requiring A of 2 for all phases of mobility w/ use of RW for OOB transfers, maximal vc and tactile cues for safety and technique   Post session: pt returned back to recliner, chair alarm engaged, all needs in reach and RN notified of session findings/recommendations and NSG staff educated/encouraged to mobilize pt BTB for no longer than 2 h r duration OOB Continue to recommend STR at time of d/c in order to maximize pt's functional independence and safety w/ mobility  Pt continues to be functioning below baseline level, and remains limited 2* factors listed above and including at risk for falls  PT will continue to see pt while here in order to address the deficits listed above and provide interventions consistent w/ POC in effort to achieve STGs  Barriers to Discharge Other (Comment)  (pt resident at [facility] per chart review)   Goals   Patient Goals none expressed   STG Expiration Date 10/30/17   Treatment Day 1   Plan   Treatment/Interventions Functional transfer training;LE strengthening/ROM; Therapeutic exercise; Endurance training;Cognitive reorientation;Patient/family training;Equipment eval/education; Bed mobility;Gait training;Continued evaluation;Spoke to nursing;Spoke to case management   Progress Slow progress, cognitive deficits   PT Frequency (3-5x/wk)   Recommendation   Recommendation Short-term skilled PT  (PT to continue to assess pending progress)   Equipment Recommended Walker  (RW at this time)   PT - OK to Discharge Yes  (when medically cleared if to STR)       Jomar Lugo, PT, DPT    Time of PT treatment session: 3661-0492

## 2017-10-25 NOTE — PROGRESS NOTES
Progress Note - Rylee Kumar 80 y o  male MRN: [id number]    Unit/Bed#: -01 Encounter: [id number]        Sepsis [facility])   Assessment & Plan      Assessment/Plan:  Sepsis secondary to gram-negative pneumonia currently resolved:  · Continue treatment of pneumonia        * Pneumonia   Assessment & Plan      Assessment/Plan:  Likely gram-negative anaerobic associated pneumonia  Day number 6 of cefepime and Flagyl  · Continue antibiotic therapy for now, total 7-10 days based on clinical exam   · Continue aggressive pulmonary toilet with Xopenex, Atrovent and Mucinex  · The chest x-ray on 10/23/2017 shows persistent infiltrate        Thrombocytopenia (HCC)   Assessment & Plan    Assessment/plan:  · Patient currently on argatroban protocol  · Heparin antibody serotonin screen pending rapid screen was negative  · If serotonin screen positive will consult Hematology  · Continue to monitor platelet count        Decubitus ulcer of buttock, stage 3 (HCC)   Assessment & Plan    Assessment/plan:  Addendum/correction-history of  Present on admission stage III decubitus, resolved  No wound  during this admission  · Continue turn the patient q 2 hours to prevent        Personal history of CLL (chronic lymphocytic leukemia)   Assessment & Plan    Assessment/plan:  At baseline  · Continue to monitor blood counts        Glaucoma   Assessment & Plan    Assessment/plan:  · Continue eyedrops at baseline        Dementia   Assessment & Plan    Assessment/plan:  · At baseline continue Namenda, Aricept              VTE Pharmacologic Prophylaxis:   Pharmacologic: Argatroban  Mechanical: Mechanical VTE prophylaxis in place  Patient Centered Rounds: I have performed bedside rounds with nursing staff today  Diomedes Gomez  Discussions with Specialists or Other Care Team Provider:  None, reviewed notes  Education and Discussions with Family / Patient:  No family at bedside will attempt to contact as soon as possible in a m    Time Spent for Care: 35 minutes  More than 50% of total time spent on counseling and coordination of care as described above  Current Length of Stay: 6 day(s)  Current Patient Status: Inpatient   Certification Statement: The patient will continue to require additional inpatient hospital stay due to Thrombocytopenia 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 currently and/or gastric pan awaiting testing in adjustment of medications patient continues to be treated for pneumonia    Discharge Plan: To be determined  Code Status: Level 3 - DNAR and DNI    Subjective:   Patient is very hard of hearing  States that he had a reasonably good day  Shows me his hand wiggling back in for this in the +minus sign with regard to how his breathing is today  Patient reports appetite reasonably well no issues with his spouse  Objective:   Vitals:   Temp (24hrs), Av 6 °F (36 4 °C), Min:97 5 °F (36 4 °C), Max:97 8 °F (36 6 °C)    HR:  [60-71] 71  Resp:  [18-22] 20  BP: (132-148)/(63-66) 145/65  SpO2:  [91 %-100 %] 94 %  Body mass index is 22 85 kg/m²  Input and Output Summary (last 24 hours): Intake/Output Summary (Last 24 hours) at 10/25/17 0056  Last data filed at 10/24/17 1901   Gross per 24 hour   Intake              720 ml   Output                0 ml   Net              720 ml       Physical Exam:     Physical Exam:   Generally hard of hearing but in no acute distress  Normocephalic, atraumatic, pupils equal round and reactive to light extraocular muscles appear to be intact mucous membranes are moist with no oral lesions  Neck is supple there is no JVD no lymph nodes no carotid bruits  Chest decreased but clear to auscultation anteriorly, right base is decreased with poor air entry I do not hear any rhonchi rales or wheezes    Left lung clear to auscultation with some crackles at the bases  Cardiovascular regular rate rhythm positive S1 and S2 appears to have a 2/6 systolic ejection murmur best heard at the apex  Abdomen obese soft nontender nondistended with positive bowel sounds no hepatosplenomegaly no guarding or rebound extremities no clubbing cyanosis edema petechiae purpura  Neurologically:  Patient with poor hearing, otherwise  strength appears equal no focal deficits noted    Additional Data:   Labs:    Results from last 7 days  Lab Units 10/24/17  0508  10/19/17  1308   WBC Thousand/uL 5 30  < > 6 50   HEMOGLOBIN g/dL 10 9*  < > 12 5   HEMATOCRIT % 33 3*  < > 38 1   PLATELETS Thousands/uL 64*  < > 96*   LYMPHO PCT %  --   --  46*   MONO PCT MAN %  --   --  2*   EOSINO PCT MANUAL %  --   --  1   < > = values in this interval not displayed  Results from last 7 days  Lab Units 10/24/17  0508  10/21/17  1426   SODIUM mmol/L 143  < >  --    POTASSIUM mmol/L 3 8  < >  --    CHLORIDE mmol/L 108  < >  --    CO2 mmol/L 28  < >  --    BUN mg/dL 16  < >  --    CREATININE mg/dL 1 05  < >  --    CALCIUM mg/dL 9 3  < >  --    TOTAL PROTEIN g/dL  --   --  4 6*   BILIRUBIN TOTAL mg/dL  --   --  1 20*   ALK PHOS U/L  --   --  70   ALT U/L  --   --  19   AST U/L  --   --  30   GLUCOSE RANDOM mg/dL 119  < >  --    < > = values in this interval not displayed  Results from last 7 days  Lab Units 10/21/17  1426   INR  1 66*       * I Have Reviewed All Lab Data Listed Above  * Additional Pertinent Lab Tests Reviewed: All Labs Within Last 24 Hours Reviewed    Imaging:    Imaging Reports Reviewed Today Include:   None herReports Reviewed Today Include:    Cultures:   Blood Culture:   Lab Results   Component Value Date    BLOODCX No Growth After 5 Days  10/19/2017    BLOODCX No Growth After 5 Days  10/19/2017    BLOODCX No Growth After 5 Days  02/06/2017    BLOODCX No Growth After 5 Days  02/06/2017    BLOODCX No Growth After 5 Days  02/02/2017    BLOODCX No Growth After 5 Days   02/02/2017     Urine Culture: No results found for: URINECX  Sputum Culture: No components found for: SPUTUMCX  Wound Culture: No results found for: WOUNDCULT    Last 24 Hours Medication List:     aspirin 81 mg Oral Daily   bimatoprost 1 drop Both Eyes HS   cefepime 2,000 mg Intravenous Q12H   cholecalciferol 2,000 Units Oral Daily   cyanocobalamin 250 mcg Oral Daily   donepezil 10 mg Oral HS   dorzolamide 1 drop Both Eyes BID   guaiFENesin 600 mg Oral Q12H SONY   ipratropium 0 5 mg Nebulization TID   levalbuterol 1 25 mg Nebulization TID   memantine 10 mg Oral BID   metroNIDAZOLE 500 mg Oral Q8H [facility] & half-way        Today, Patient Was Seen By: Armando De La Cruz MD

## 2017-10-25 NOTE — SUBJECTIVE & OBJECTIVE
VTE Pharmacologic Prophylaxis:   Pharmacologic: Argatroban  Mechanical: Mechanical VTE prophylaxis in place  Patient Centered Rounds: I have performed bedside rounds with nursing staff today  Michael Gandhi  Discussions with Specialists or Other Care Team Provider:  None, reviewed notes  Education and Discussions with Family / Patient:  No family at bedside will attempt to contact as soon as possible in a m  Time Spent for Care: 35 minutes  More than 50% of total time spent on counseling and coordination of care as described above  Current Length of Stay: 6 day(s)  Current Patient Status: Inpatient   Certification Statement: The patient will continue to require additional inpatient hospital stay due to Thrombocytopenia 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 currently and/or gastric pan awaiting testing in adjustment of medications patient continues to be treated for pneumonia    Discharge Plan: To be determined  Code Status: Level 3 - DNAR and DNI    Subjective:   Patient is very hard of hearing  States that he had a reasonably good day  Shows me his hand wiggling back in for this in the +minus sign with regard to how his breathing is today  Patient reports appetite reasonably well no issues with his spouse  Objective:   Vitals:   Temp (24hrs), Av 6 °F (36 4 °C), Min:97 5 °F (36 4 °C), Max:97 8 °F (36 6 °C)    HR:  [60-71] 71  Resp:  [18-22] 20  BP: (132-148)/(63-66) 145/65  SpO2:  [91 %-100 %] 94 %  Body mass index is 22 85 kg/m²  Input and Output Summary (last 24 hours):        Intake/Output Summary (Last 24 hours) at 10/25/17 0056  Last data filed at 10/24/17 1901   Gross per 24 hour   Intake              720 ml   Output                0 ml   Net              720 ml       Physical Exam:     Physical Exam:   Generally hard of hearing but in no acute distress  Normocephalic, atraumatic, pupils equal round and reactive to light extraocular muscles appear to be intact mucous membranes are moist with no oral lesions  Neck is supple there is no JVD no lymph nodes no carotid bruits  Chest decreased but clear to auscultation anteriorly, right base is decreased with poor air entry I do not hear any rhonchi rales or wheezes  Left lung clear to auscultation with some crackles at the bases  Cardiovascular regular rate rhythm positive S1 and S2 appears to have a 2/6 systolic ejection murmur best heard at the apex  Abdomen obese soft nontender nondistended with positive bowel sounds no hepatosplenomegaly no guarding or rebound extremities no clubbing cyanosis edema petechiae purpura  Neurologically:  Patient with poor hearing, otherwise  strength appears equal no focal deficits noted    Additional Data:   Labs:    Results from last 7 days  Lab Units 10/24/17  0508  10/19/17  1308   WBC Thousand/uL 5 30  < > 6 50   HEMOGLOBIN g/dL 10 9*  < > 12 5   HEMATOCRIT % 33 3*  < > 38 1   PLATELETS Thousands/uL 64*  < > 96*   LYMPHO PCT %  --   --  46*   MONO PCT MAN %  --   --  2*   EOSINO PCT MANUAL %  --   --  1   < > = values in this interval not displayed  Results from last 7 days  Lab Units 10/24/17  0508  10/21/17  1426   SODIUM mmol/L 143  < >  --    POTASSIUM mmol/L 3 8  < >  --    CHLORIDE mmol/L 108  < >  --    CO2 mmol/L 28  < >  --    BUN mg/dL 16  < >  --    CREATININE mg/dL 1 05  < >  --    CALCIUM mg/dL 9 3  < >  --    TOTAL PROTEIN g/dL  --   --  4 6*   BILIRUBIN TOTAL mg/dL  --   --  1 20*   ALK PHOS U/L  --   --  70   ALT U/L  --   --  19   AST U/L  --   --  30   GLUCOSE RANDOM mg/dL 119  < >  --    < > = values in this interval not displayed  Results from last 7 days  Lab Units 10/21/17  1426   INR  1 66*       * I Have Reviewed All Lab Data Listed Above  * Additional Pertinent Lab Tests Reviewed:  All Labs Within Last 24 Hours Reviewed    Imaging:    Imaging Reports Reviewed Today Include:   None herReports Reviewed Today Include:    Cultures:   Blood Culture:   Lab Results Component Value Date    BLOODCX No Growth After 5 Days  10/19/2017    BLOODCX No Growth After 5 Days  10/19/2017    BLOODCX No Growth After 5 Days  02/06/2017    BLOODCX No Growth After 5 Days  02/06/2017    BLOODCX No Growth After 5 Days  02/02/2017    BLOODCX No Growth After 5 Days   02/02/2017     Urine Culture: No results found for: URINECX  Sputum Culture: No components found for: SPUTUMCX  Wound Culture: No results found for: WOUNDCULT    Last 24 Hours Medication List:     aspirin 81 mg Oral Daily   bimatoprost 1 drop Both Eyes HS   cefepime 2,000 mg Intravenous Q12H   cholecalciferol 2,000 Units Oral Daily   cyanocobalamin 250 mcg Oral Daily   donepezil 10 mg Oral HS   dorzolamide 1 drop Both Eyes BID   guaiFENesin 600 mg Oral Q12H SONY   ipratropium 0 5 mg Nebulization TID   levalbuterol 1 25 mg Nebulization TID   memantine 10 mg Oral BID   metroNIDAZOLE 500 mg Oral Q8H [address]        Today, Patient Was Seen By: Wei Shaffer MD

## 2017-10-25 NOTE — PLAN OF CARE
Problem: PHYSICAL THERAPY ADULT  Goal: Performs mobility at highest level of function for planned discharge setting  See evaluation for individualized goals  Treatment/Interventions: Functional transfer training, LE strengthening/ROM, Therapeutic exercise, Endurance training, Cognitive reorientation, Patient/family training, Equipment eval/education, Bed mobility, Gait training, Continued evaluation, Spoke to nursing, Spoke to case management, OT  Equipment Recommended: Yaima Laguerre (LOUIE at this time)       See flowsheet documentation for full assessment, interventions and recommendations  Outcome: Progressing  Prognosis: Guarded  Problem List: Decreased strength, Decreased endurance, Impaired balance, Decreased mobility, Decreased cognition, Decreased safety awareness, Impaired vision, Impaired hearing  Assessment: Pt seen for PT treatment session this date with interventions consisting of Therapeutic exercise consisting of: AROM 5 B LE in sitting position and therapeutic activity consisting of training: supine<>sit transfers, sit<>stand transfers, vc and tactile cues for static standing posture faciliation and short trial of ambulation from EOB to recliner for 5' distance  Pt agreeable to PT treatment session upon arrival, pt found supine in bed w/ HOB elevated, in no apparent distress, A&O x 2 and responsive  In comparison to previous session, pt with improvements in initiation of B LE Abdelrahman, still requiring A of 2 for all phases of mobility w/ use of RW for OOB transfers, maximal vc and tactile cues for safety and technique  Post session: pt returned back to recliner, chair alarm engaged, all needs in reach and RN notified of session findings/recommendations and NSG staff educated/encouraged to mobilize pt BTB for no longer than 2 h r duration OOB Continue to recommend STR at time of d/c in order to maximize pt's functional independence and safety w/ mobility   Pt continues to be functioning below baseline level, and remains limited 2* factors listed above and including at risk for falls  PT will continue to see pt while here in order to address the deficits listed above and provide interventions consistent w/ POC in effort to achieve STGs  Barriers to Discharge: Other (Comment) (pt resident at [facility] per chart review)     Recommendation: Short-term skilled PT (PT to continue to assess pending progress)     PT - OK to Discharge: Yes (when medically cleared if to STR)    See flowsheet documentation for full assessment

## 2017-10-26 LAB
APTT PPP: 55 SECONDS (ref 23–35)
APTT PPP: 69 SECONDS (ref 23–35)
APTT PPP: 72 SECONDS (ref 23–35)
ERYTHROCYTE [DISTWIDTH] IN BLOOD BY AUTOMATED COUNT: 13 % (ref 11.6–15.1)
HCT VFR BLD AUTO: 33 % (ref 36.5–49.3)
HGB BLD-MCNC: 10.8 G/DL (ref 12–17)
MCH RBC QN AUTO: 31.3 PG (ref 26.8–34.3)
MCHC RBC AUTO-ENTMCNC: 32.7 G/DL (ref 31.4–37.4)
MCV RBC AUTO: 96 FL (ref 82–98)
PLATELET # BLD AUTO: 117 THOUSANDS/UL (ref 149–390)
PMV BLD AUTO: 9.8 FL (ref 8.9–12.7)
RBC # BLD AUTO: 3.45 MILLION/UL (ref 3.88–5.62)
WBC # BLD AUTO: 4.67 THOUSAND/UL (ref 4.31–10.16)

## 2017-10-26 PROCEDURE — 94640 AIRWAY INHALATION TREATMENT: CPT

## 2017-10-26 PROCEDURE — 94668 MNPJ CHEST WALL SBSQ: CPT

## 2017-10-26 PROCEDURE — 85730 THROMBOPLASTIN TIME PARTIAL: CPT | Performed by: INTERNAL MEDICINE

## 2017-10-26 PROCEDURE — 85027 COMPLETE CBC AUTOMATED: CPT | Performed by: PHYSICIAN ASSISTANT

## 2017-10-26 PROCEDURE — 94760 N-INVAS EAR/PLS OXIMETRY 1: CPT

## 2017-10-26 RX ADMIN — METRONIDAZOLE 500 MG: 500 TABLET ORAL at 21:04

## 2017-10-26 RX ADMIN — CEFEPIME HYDROCHLORIDE 2000 MG: 2 INJECTION, POWDER, FOR SOLUTION INTRAVENOUS at 09:51

## 2017-10-26 RX ADMIN — METRONIDAZOLE 500 MG: 500 TABLET ORAL at 09:51

## 2017-10-26 RX ADMIN — LEVALBUTEROL HYDROCHLORIDE 1.25 MG: 1.25 SOLUTION, CONCENTRATE RESPIRATORY (INHALATION) at 10:18

## 2017-10-26 RX ADMIN — MEMANTINE HYDROCHLORIDE 10 MG: 5 TABLET ORAL at 18:39

## 2017-10-26 RX ADMIN — CHOLECALCIFEROL TAB 25 MCG (1000 UNIT) 2000 UNITS: 25 TAB at 09:43

## 2017-10-26 RX ADMIN — DORZOLAMIDE HYDROCHLORIDE 1 DROP: 20 SOLUTION/ DROPS OPHTHALMIC at 09:51

## 2017-10-26 RX ADMIN — IPRATROPIUM BROMIDE 0.5 MG: 0.5 SOLUTION RESPIRATORY (INHALATION) at 14:47

## 2017-10-26 RX ADMIN — CYANOCOBALAMIN TAB 500 MCG 250 MCG: 500 TAB at 09:43

## 2017-10-26 RX ADMIN — ASPIRIN 81 MG 81 MG: 81 TABLET ORAL at 09:42

## 2017-10-26 RX ADMIN — IPRATROPIUM BROMIDE 0.5 MG: 0.5 SOLUTION RESPIRATORY (INHALATION) at 20:10

## 2017-10-26 RX ADMIN — GUAIFENESIN 600 MG: 600 TABLET, EXTENDED RELEASE ORAL at 09:43

## 2017-10-26 RX ADMIN — DORZOLAMIDE HYDROCHLORIDE 1 DROP: 20 SOLUTION/ DROPS OPHTHALMIC at 18:40

## 2017-10-26 RX ADMIN — DONEPEZIL HYDROCHLORIDE 10 MG: 5 TABLET ORAL at 21:04

## 2017-10-26 RX ADMIN — IPRATROPIUM BROMIDE 0.5 MG: 0.5 SOLUTION RESPIRATORY (INHALATION) at 10:18

## 2017-10-26 RX ADMIN — GUAIFENESIN 600 MG: 600 TABLET, EXTENDED RELEASE ORAL at 21:04

## 2017-10-26 RX ADMIN — MEMANTINE HYDROCHLORIDE 10 MG: 5 TABLET ORAL at 09:44

## 2017-10-26 RX ADMIN — METRONIDAZOLE 500 MG: 500 TABLET ORAL at 14:48

## 2017-10-26 RX ADMIN — LEVALBUTEROL HYDROCHLORIDE 1.25 MG: 1.25 SOLUTION, CONCENTRATE RESPIRATORY (INHALATION) at 14:47

## 2017-10-26 RX ADMIN — BIMATOPROST 1 DROP: 0.1 SOLUTION/ DROPS OPHTHALMIC at 21:10

## 2017-10-26 RX ADMIN — LEVALBUTEROL HYDROCHLORIDE 1.25 MG: 1.25 SOLUTION, CONCENTRATE RESPIRATORY (INHALATION) at 20:10

## 2017-10-26 RX ADMIN — ARGATROBAN 0.25 MCG/KG/MIN: 1 INJECTION INTRAVENOUS at 09:11

## 2017-10-26 RX ADMIN — CEFEPIME HYDROCHLORIDE 2000 MG: 2 INJECTION, POWDER, FOR SOLUTION INTRAVENOUS at 21:03

## 2017-10-26 RX ADMIN — ARGATROBAN 0.75 MCG/KG/MIN: 1 INJECTION INTRAVENOUS at 19:26

## 2017-10-27 LAB
ANION GAP SERPL CALCULATED.3IONS-SCNC: 7 MMOL/L (ref 4–13)
APTT PPP: 54 SECONDS (ref 23–35)
APTT PPP: 80 SECONDS (ref 23–35)
APTT PPP: 94 SECONDS (ref 23–35)
BUN SERPL-MCNC: 20 MG/DL (ref 5–25)
CALCIUM SERPL-MCNC: 8.9 MG/DL (ref 8.3–10.1)
CHLORIDE SERPL-SCNC: 107 MMOL/L (ref 100–108)
CO2 SERPL-SCNC: 28 MMOL/L (ref 21–32)
CREAT SERPL-MCNC: 0.96 MG/DL (ref 0.6–1.3)
ERYTHROCYTE [DISTWIDTH] IN BLOOD BY AUTOMATED COUNT: 13 % (ref 11.6–15.1)
GFR SERPL CREATININE-BSD FRML MDRD: 67 ML/MIN/1.73SQ M
GLUCOSE SERPL-MCNC: 119 MG/DL (ref 65–140)
HCT VFR BLD AUTO: 34.2 % (ref 36.5–49.3)
HGB BLD-MCNC: 11.1 G/DL (ref 12–17)
MCH RBC QN AUTO: 31.1 PG (ref 26.8–34.3)
MCHC RBC AUTO-ENTMCNC: 32.5 G/DL (ref 31.4–37.4)
MCV RBC AUTO: 96 FL (ref 82–98)
PLATELET # BLD AUTO: 151 THOUSANDS/UL (ref 149–390)
PMV BLD AUTO: 9.7 FL (ref 8.9–12.7)
POTASSIUM SERPL-SCNC: 4.1 MMOL/L (ref 3.5–5.3)
RBC # BLD AUTO: 3.57 MILLION/UL (ref 3.88–5.62)
SODIUM SERPL-SCNC: 142 MMOL/L (ref 136–145)
SRA .2 IU/ML UFH SER-ACNC: 3 % (ref 0–20)
SRA 100IU/ML UFH SER-ACNC: 3 % (ref 0–20)
SRA UFH SER-IMP: NORMAL
WBC # BLD AUTO: 5.01 THOUSAND/UL (ref 4.31–10.16)

## 2017-10-27 PROCEDURE — 94640 AIRWAY INHALATION TREATMENT: CPT

## 2017-10-27 PROCEDURE — 94760 N-INVAS EAR/PLS OXIMETRY 1: CPT

## 2017-10-27 PROCEDURE — 80048 BASIC METABOLIC PNL TOTAL CA: CPT | Performed by: INTERNAL MEDICINE

## 2017-10-27 PROCEDURE — 85730 THROMBOPLASTIN TIME PARTIAL: CPT | Performed by: INTERNAL MEDICINE

## 2017-10-27 PROCEDURE — 85027 COMPLETE CBC AUTOMATED: CPT | Performed by: INTERNAL MEDICINE

## 2017-10-27 RX ADMIN — MEMANTINE HYDROCHLORIDE 10 MG: 5 TABLET ORAL at 09:56

## 2017-10-27 RX ADMIN — CYANOCOBALAMIN TAB 500 MCG 250 MCG: 500 TAB at 09:56

## 2017-10-27 RX ADMIN — LEVALBUTEROL HYDROCHLORIDE 1.25 MG: 1.25 SOLUTION, CONCENTRATE RESPIRATORY (INHALATION) at 07:50

## 2017-10-27 RX ADMIN — MEMANTINE HYDROCHLORIDE 10 MG: 5 TABLET ORAL at 17:08

## 2017-10-27 RX ADMIN — DONEPEZIL HYDROCHLORIDE 10 MG: 5 TABLET ORAL at 21:12

## 2017-10-27 RX ADMIN — IPRATROPIUM BROMIDE 0.5 MG: 0.5 SOLUTION RESPIRATORY (INHALATION) at 07:50

## 2017-10-27 RX ADMIN — ARGATROBAN 0.75 MCG/KG/MIN: 1 INJECTION INTRAVENOUS at 23:47

## 2017-10-27 RX ADMIN — DORZOLAMIDE HYDROCHLORIDE 1 DROP: 20 SOLUTION/ DROPS OPHTHALMIC at 17:08

## 2017-10-27 RX ADMIN — GUAIFENESIN 600 MG: 600 TABLET, EXTENDED RELEASE ORAL at 21:22

## 2017-10-27 RX ADMIN — IPRATROPIUM BROMIDE 0.5 MG: 0.5 SOLUTION RESPIRATORY (INHALATION) at 20:51

## 2017-10-27 RX ADMIN — ASPIRIN 81 MG 81 MG: 81 TABLET ORAL at 09:56

## 2017-10-27 RX ADMIN — CEFEPIME HYDROCHLORIDE 2000 MG: 2 INJECTION, POWDER, FOR SOLUTION INTRAVENOUS at 10:07

## 2017-10-27 RX ADMIN — METRONIDAZOLE 500 MG: 500 TABLET ORAL at 14:18

## 2017-10-27 RX ADMIN — IPRATROPIUM BROMIDE 0.5 MG: 0.5 SOLUTION RESPIRATORY (INHALATION) at 13:53

## 2017-10-27 RX ADMIN — ARGATROBAN 0.75 MCG/KG/MIN: 1 INJECTION INTRAVENOUS at 02:46

## 2017-10-27 RX ADMIN — METRONIDAZOLE 500 MG: 500 TABLET ORAL at 05:54

## 2017-10-27 RX ADMIN — CEFEPIME HYDROCHLORIDE 2000 MG: 2 INJECTION, POWDER, FOR SOLUTION INTRAVENOUS at 21:12

## 2017-10-27 RX ADMIN — ARGATROBAN 1.25 MCG/KG/MIN: 1 INJECTION INTRAVENOUS at 16:22

## 2017-10-27 RX ADMIN — LEVALBUTEROL HYDROCHLORIDE 1.25 MG: 1.25 SOLUTION, CONCENTRATE RESPIRATORY (INHALATION) at 13:53

## 2017-10-27 RX ADMIN — METRONIDAZOLE 500 MG: 500 TABLET ORAL at 21:12

## 2017-10-27 RX ADMIN — GUAIFENESIN 600 MG: 600 TABLET, EXTENDED RELEASE ORAL at 09:56

## 2017-10-27 RX ADMIN — BIMATOPROST 1 DROP: 0.1 SOLUTION/ DROPS OPHTHALMIC at 21:12

## 2017-10-27 RX ADMIN — LEVALBUTEROL HYDROCHLORIDE 1.25 MG: 1.25 SOLUTION, CONCENTRATE RESPIRATORY (INHALATION) at 20:51

## 2017-10-27 RX ADMIN — CHOLECALCIFEROL TAB 25 MCG (1000 UNIT) 2000 UNITS: 25 TAB at 09:56

## 2017-10-27 RX ADMIN — DORZOLAMIDE HYDROCHLORIDE 1 DROP: 20 SOLUTION/ DROPS OPHTHALMIC at 10:00

## 2017-10-27 NOTE — PROGRESS NOTES
Progress Note - Lem Ganser 80 y o  male MRN: [id number]    Unit/Bed#: -01 Encounter: [id number]        Sepsis [facility])   Assessment & Plan      Assessment/Plan:  Sepsis secondary to gram-negative pneumonia currently resolved:  · Continue treatment of pneumonia        * Pneumonia   Assessment & Plan      Assessment/Plan:  Likely gram-negative anaerobic associated pneumonia  Day number 8 of cefepime and Flagyl  · Continue antibiotic therapy for now, total 10 days based on clinical exam   · Continue aggressive pulmonary toilet with Xopenex, Atrovent and Mucinex  · The chest x-ray on 10/23/2017 shows persistent infiltrate  · Noted speech eval with chronic aspiration and patient desire for more normalized diet  Discussed future options with son , started palliative conversation regarding recurrent pneumonias  Thrombocytopenia ([facility] [address] )   Assessment & Plan    Assessment/plan:  · Patient currently on argatroban protocol  · Heparin antibody serotonin screen pending rapid screen was negative  · If serotonin screen positive will consult Hematology  · Continue to monitor platelet count- they have improved, now above 100        Decubitus ulcer of buttock, stage 3 (HCC)   Assessment & Plan    Assessment/plan:  Addendum/correction-history a Present on admission stage III decubitus, resolved  No wound during this admission  · Continue turn the patient q 2 hours, to prevent recurrence        Personal history of CLL (chronic lymphocytic leukemia)   Assessment & Plan    Assessment/plan:  At baseline  · Continue to monitor blood counts        Dementia   Assessment & Plan    Assessment/plan:  · At baseline continue Namenda, Aricept              VTE Pharmacologic Prophylaxis:   Pharmacologic: Argatroban  Mechanical: Mechanical VTE prophylaxis in place      Patient Centered Rounds: I have evaluated patient without nursing staff present due to discussed with nursing outside the room  Discussions with Specialists or Other Care Team Provider: NOne  Education and Discussions with Family / Patient: updated sone this am   Time Spent for Care: 30 minutes  More than 50% of total time spent on counseling and coordination of care as described above  Current Length of Stay: 7 day(s)  Current Patient Status: Inpatient   Certification Statement: The patient will continue to require additional inpatient hospital stay due to awaiting testing for HIT, platlets recovering    Discharge Plan: tbd  Code Status: Level 3 - DNAR and DNI    Subjective:   Patient states he feels like he is between two worlds  He denies pain, shortness of breath  He shared he was a [facility]   Objective:   Vitals:   Temp (24hrs), Av 9 °F (36 6 °C), Min:97 9 °F (36 6 °C), Max:97 9 °F (36 6 °C)    HR:  [61-75] 66  Resp:  [20-22] 20  BP: (136-150)/(60-68) 136/60  SpO2:  [93 %-96 %] 95 %  Body mass index is 22 85 kg/m²  Input and Output Summary (last 24 hours): Intake/Output Summary (Last 24 hours) at 10/26/17 2046  Last data filed at 10/26/17 1300   Gross per 24 hour   Intake              150 ml   Output                0 ml   Net              150 ml       Physical Exam:     Physical Exam   Constitutional:   Moderately nourished, frail appearing   HENT:   Head: Normocephalic and atraumatic  Eyes: Conjunctivae and EOM are normal  Pupils are equal, round, and reactive to light  Neck: Normal range of motion  Neck supple  Cardiovascular: Normal rate, regular rhythm and normal heart sounds  Pulmonary/Chest: Effort normal and breath sounds normal    Abdominal: Soft  Bowel sounds are normal    Neurological: He is alert  No cranial nerve deficit  Skin: Skin is warm and dry         Additional Data:   Labs:    Results from last 7 days  Lab Units 10/26/17  0629   WBC Thousand/uL 4 67   HEMOGLOBIN g/dL 10 8*   HEMATOCRIT % 33 0*   PLATELETS Thousands/uL 117*       Results from last 7 days  Lab Units 10/25/17  0536  10/21/17  1426   SODIUM mmol/L 143 < >  --    POTASSIUM mmol/L 3 4*  < >  --    CHLORIDE mmol/L 108  < >  --    CO2 mmol/L 28  < >  --    BUN mg/dL 17  < >  --    CREATININE mg/dL 0 95  < >  --    CALCIUM mg/dL 9 0  < >  --    TOTAL PROTEIN g/dL  --   --  4 6*   BILIRUBIN TOTAL mg/dL  --   --  1 20*   ALK PHOS U/L  --   --  70   ALT U/L  --   --  19   AST U/L  --   --  30   GLUCOSE RANDOM mg/dL 115  < >  --    < > = values in this interval not displayed  Results from last 7 days  Lab Units 10/21/17  1426   INR  1 66*       * I Have Reviewed All Lab Data Listed Above  * Additional Pertinent Lab Tests Reviewed: All Labs Within Last 24 Hours Reviewed    Imaging:    Imaging Reports Reviewed Today Include: None    Cultures:   Blood Culture:   Lab Results   Component Value Date    BLOODCX No Growth After 5 Days  10/19/2017    BLOODCX No Growth After 5 Days  10/19/2017    BLOODCX No Growth After 5 Days  02/06/2017    BLOODCX No Growth After 5 Days  02/06/2017    BLOODCX No Growth After 5 Days  02/02/2017    BLOODCX No Growth After 5 Days   02/02/2017     Urine Culture: No results found for: URINECX  Sputum Culture: No components found for: SPUTUMCX  Wound Culture: No results found for: WOUNDCULT    Last 24 Hours Medication List:     aspirin 81 mg Oral Daily   bimatoprost 1 drop Both Eyes HS   cefepime 2,000 mg Intravenous Q12H   cholecalciferol 2,000 Units Oral Daily   cyanocobalamin 250 mcg Oral Daily   donepezil 10 mg Oral HS   dorzolamide 1 drop Both Eyes BID   guaiFENesin 600 mg Oral Q12H SONY   ipratropium 0 5 mg Nebulization TID   levalbuterol 1 25 mg Nebulization TID   memantine 10 mg Oral BID   metroNIDAZOLE 500 mg Oral Q8H [address]        Today, Patient Was Seen By: Boris Mirza MD

## 2017-10-27 NOTE — PLAN OF CARE
Problem: DISCHARGE PLANNING - CARE MANAGEMENT  Goal: Discharge to post-acute care or home with appropriate resources  INTERVENTIONS:  - Conduct assessment to determine patient/family and health care team treatment goals, and need for post-acute services based on payer coverage, community resources, and patient preferences, and barriers to discharge  - Address psychosocial, clinical, and financial barriers to discharge as identified in assessment in conjunction with the patient/family and health care team  - Arrange appropriate level of post-acute services according to patient's   needs and preference and payer coverage in collaboration with the physician and health care team  - Communicate with and update the patient/family, physician, and health care team regarding progress on the discharge plan  - Arrange appropriate transportation to post-acute venues   Outcome: Progressing  CM spoke to son Nelson Oliveira via phone  Pt will be transported to [facility] when medically cleared  Son is in agreement with this and just wants to be kept informed of discharge date  He will be coming in to visit with his tather on Sunday into Monday

## 2017-10-27 NOTE — SUBJECTIVE & OBJECTIVE
VTE Pharmacologic Prophylaxis:   Pharmacologic: Argatroban  Mechanical: Mechanical VTE prophylaxis in place  Patient Centered Rounds: I have evaluated patient without nursing staff present due to discussed with nursing outside the room  Discussions with Specialists or Other Care Team Provider: NOne  Education and Discussions with Family / Patient: updated rupal this am   Time Spent for Care: 30 minutes  More than 50% of total time spent on counseling and coordination of care as described above  Current Length of Stay: 7 day(s)  Current Patient Status: Inpatient   Certification Statement: The patient will continue to require additional inpatient hospital stay due to awaiting testing for HIT, platlets recovering    Discharge Plan: tbd  Code Status: Level 3 - DNAR and DNI    Subjective:   Patient states he feels like he is between two worlds  He denies pain, shortness of breath  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   Objective:   Vitals:   Temp (24hrs), Av 9 °F (36 6 °C), Min:97 9 °F (36 6 °C), Max:97 9 °F (36 6 °C)    HR:  [61-75] 66  Resp:  [20-22] 20  BP: (136-150)/(60-68) 136/60  SpO2:  [93 %-96 %] 95 %  Body mass index is 22 85 kg/m²  Input and Output Summary (last 24 hours): Intake/Output Summary (Last 24 hours) at 10/26/17 2046  Last data filed at 10/26/17 1300   Gross per 24 hour   Intake              150 ml   Output                0 ml   Net              150 ml       Physical Exam:     Physical Exam   Constitutional:   Moderately nourished, frail appearing   HENT:   Head: Normocephalic and atraumatic  Eyes: Conjunctivae and EOM are normal  Pupils are equal, round, and reactive to light  Neck: Normal range of motion  Neck supple  Cardiovascular: Normal rate, regular rhythm and normal heart sounds  Pulmonary/Chest: Effort normal and breath sounds normal    Abdominal: Soft  Bowel sounds are normal    Neurological: He is alert  No cranial nerve deficit  Skin: Skin is warm and dry  Additional Data:   Labs:    Results from last 7 days  Lab Units 10/26/17  0629   WBC Thousand/uL 4 67   HEMOGLOBIN g/dL 10 8*   HEMATOCRIT % 33 0*   PLATELETS Thousands/uL 117*       Results from last 7 days  Lab Units 10/25/17  0536  10/21/17  1426   SODIUM mmol/L 143  < >  --    POTASSIUM mmol/L 3 4*  < >  --    CHLORIDE mmol/L 108  < >  --    CO2 mmol/L 28  < >  --    BUN mg/dL 17  < >  --    CREATININE mg/dL 0 95  < >  --    CALCIUM mg/dL 9 0  < >  --    TOTAL PROTEIN g/dL  --   --  4 6*   BILIRUBIN TOTAL mg/dL  --   --  1 20*   ALK PHOS U/L  --   --  70   ALT U/L  --   --  19   AST U/L  --   --  30   GLUCOSE RANDOM mg/dL 115  < >  --    < > = values in this interval not displayed  Results from last 7 days  Lab Units 10/21/17  1426   INR  1 66*       * I Have Reviewed All Lab Data Listed Above  * Additional Pertinent Lab Tests Reviewed: All Labs Within Last 24 Hours Reviewed    Imaging:    Imaging Reports Reviewed Today Include: None    Cultures:   Blood Culture:   Lab Results   Component Value Date    BLOODCX No Growth After 5 Days  10/19/2017    BLOODCX No Growth After 5 Days  10/19/2017    BLOODCX No Growth After 5 Days  02/06/2017    BLOODCX No Growth After 5 Days  02/06/2017    BLOODCX No Growth After 5 Days  02/02/2017    BLOODCX No Growth After 5 Days   02/02/2017     Urine Culture: No results found for: URINECX  Sputum Culture: No components found for: SPUTUMCX  Wound Culture: No results found for: WOUNDCULT    Last 24 Hours Medication List:     aspirin 81 mg Oral Daily   bimatoprost 1 drop Both Eyes HS   cefepime 2,000 mg Intravenous Q12H   cholecalciferol 2,000 Units Oral Daily   cyanocobalamin 250 mcg Oral Daily   donepezil 10 mg Oral HS   dorzolamide 1 drop Both Eyes BID   guaiFENesin 600 mg Oral Q12H SONY   ipratropium 0 5 mg Nebulization TID   levalbuterol 1 25 mg Nebulization TID   memantine 10 mg Oral BID   metroNIDAZOLE 500 mg Oral [facility]        Today, Patient Was Seen By: Christina Lambert Aimee Ospina MD

## 2017-10-27 NOTE — ASSESSMENT & PLAN NOTE
Assessment/plan:  · Patient currently on argatroban protocol  · Heparin antibody serotonin screen pending rapid screen was negative    · If serotonin screen positive will consult Hematology  · Continue to monitor platelet count- they have improved, now above 100

## 2017-10-27 NOTE — SOCIAL WORK
CM spoke to son Yinka Meeks via phone  Pt will be transported to [facility] when medically cleared  Son is in agreement with this and just wants to be kept informed of discharge date  He will be coming in to visit with his tather on Sunday into Monday

## 2017-10-27 NOTE — ASSESSMENT & PLAN NOTE
Assessment/Plan:  Likely gram-negative anaerobic associated pneumonia  Day number 8 of cefepime and Flagyl  · Continue antibiotic therapy for now, total 10 days based on clinical exam   · Continue aggressive pulmonary toilet with Xopenex, Atrovent and Mucinex  · The chest x-ray on 10/23/2017 shows persistent infiltrate  · Noted speech eval with chronic aspiration and patient desire for more normalized diet  Discussed future options with son , started palliative conversation regarding recurrent pneumonias

## 2017-10-27 NOTE — CASE MANAGEMENT
Continued Stay Review    Date: 10/27    Vital Signs: /65   Pulse 69   Temp 97 6 °F (36 4 °C) (Oral)   Resp 18   Ht 6' 2" (1 88 m)   Wt 80 7 kg (178 lb)   SpO2 95%   BMI 22 85 kg/m²     Medications:   Scheduled Meds:   aspirin 81 mg Oral Daily   bimatoprost 1 drop Both Eyes HS   cefepime 2,000 mg Intravenous Q12H   cholecalciferol 2,000 Units Oral Daily   cyanocobalamin 250 mcg Oral Daily   donepezil 10 mg Oral HS   dorzolamide 1 drop Both Eyes BID   guaiFENesin 600 mg Oral Q12H SONY   ipratropium 0 5 mg Nebulization TID   levalbuterol 1 25 mg Nebulization TID   memantine 10 mg Oral BID   metroNIDAZOLE 500 mg Oral Q8H [address]     Continuous Infusions:   argatroban 0 05-3 mcg/kg/min Last Rate: 0 75 mcg/kg/min (10/27/17 0246)     PRN Meds:   acetaminophen    sodium chloride (PF)    sodium chloride    Abnormal Labs/Diagnostic Results: H&H  11 1  34 2, ptt 80    Age/Sex: 80 y o  male     Assessment/Plan: Note from 10/26  Sepsis [facility])   Assessment & Plan        Assessment/Plan:  Sepsis secondary to gram-negative pneumonia currently resolved:  · Continue treatment of pneumonia       * Pneumonia   Assessment & Plan        Assessment/Plan:  Likely gram-negative anaerobic associated pneumonia  Day number 8 of cefepime and Flagyl  · Continue antibiotic therapy for now, total 10 days based on clinical exam   · Continue aggressive pulmonary toilet with Xopenex, Atrovent and Mucinex  · The chest x-ray on 10/23/2017 shows persistent infiltrate  · Noted speech eval with chronic aspiration and patient desire for more normalized diet  Discussed future options with son , started palliative conversation regarding recurrent pneumonias        Thrombocytopenia ([address] )   Assessment & Plan     Assessment/plan:  · Patient currently on argatroban protocol  · Heparin antibody serotonin screen pending rapid screen was negative    · If serotonin screen positive will consult Hematology  · Continue to monitor platelet count- they have improved, now above 100       Decubitus ulcer of buttock, stage 3 (HCC)   Assessment & Plan     Assessment/plan:  Present on admission stage III decubitus  · Continue turn the patient q 2 hours       Personal history of CLL (chronic lymphocytic leukemia)   Assessment & Plan     Assessment/plan:  At baseline  · Continue to monitor blood counts       Dementia   Assessment & Plan     Assessment/plan:  · At baseline continue Namenda, Aricept                VTE Pharmacologic Prophylaxis:   Pharmacologic: Argatroban  Mechanical: Mechanical VTE prophylaxis in place      Patient Centered Rounds: I have evaluated patient without nursing staff present due to discussed with nursing outside the room  Discussions with Specialists or Other Care Team Provider: NOne  Education and Discussions with Family / Patient: updated rupal this am   Time Spent for Care: 30 minutes    More than 50% of total time spent on counseling and coordination of care as described above      Current Length of Stay: 7 day(s)  Current Patient Status: Inpatient   Certification Statement: The patient will continue to require additional inpatient hospital stay due to awaiting testing for HIT, platlets recovering     Discharge Plan: tbd  Code Status: Level 3 - DNAR and DNI    PT eval   Recommendation   Recommendation Short-term skilled PT  (PT to continue to assess pending progress)

## 2017-10-27 NOTE — SOCIAL WORK
CM met with pt at bedside  Pt is a possible d/c Monday to [facility]  IMM reviewed with pt  Questions answered  Pt signed IMM  Copy to pt and copy to MR for scanning

## 2017-10-27 NOTE — PROGRESS NOTES
[facility] Medicine Progress Note  Patient: Thad Polly 80 y o  male   MRN: [id number]  PCP: Cindy Colunga DO  Unit/Bed#: -[facility] Encounter: [id number]  Date Of Visit: 10/25/17    Assessment:    Principal Problem:    Pneumonia  Active Problems:    Sepsis ([facility] )    Thrombocytopenia ([facility] )    Dementia    Glaucoma    Personal history of CLL (chronic lymphocytic leukemia)    Decubitus ulcer of buttock, stage 3 ([facility] )      Plan:    · Pneumonia: improved  Less we with cough  Day # 7 Cefepime and Flagyl  · Dementia: continue aricept and namenda  · Thrombocytopenia: improving on argatraban  Awaiting confirmatory assay  · History of stage III Decubitus: continue turning and aggressive local care, to prevent recurrence  ·  CLL: no intervention  ·       VTE Pharmacologic Prophylaxis:   Pharmacologic: Argatroban  Mechanical VTE Prophylaxis in Place: Yes    Patient Centered Rounds: I have evaluated patient without nursing staff present due to discussed with staff after    Discussions with Specialists or Other Care Team Provider: none    Education and Discussions with Family / Patient: will attempt to speak with son again    Time Spent for Care: 30 minutes  More than 50% of total time spent on counseling and coordination of care as described above  Current Length of Stay: 7 day(s)    Current Patient Status: Inpatient   Certification Statement: The patient will continue to require additional inpatient hospital stay due to treating pneumonia, and awaiting testing for hit    Discharge Plan / Estimated Discharge Date: tbd    Code Status: Level 3 - DNAR and DNI      Subjective:   Patient states "so so"  Noted some choking cough  Objective:     Vitals:   Temp (24hrs), Av 9 °F (36 6 °C), Min:97 9 °F (36 6 °C), Max:97 9 °F (36 6 °C)    HR:  [61-75] 66  Resp:  [20-22] 20  BP: (136-150)/(60-68) 136/60  SpO2:  [93 %-96 %] 95 %  Body mass index is 22 85 kg/m²       Input and Output Summary (last 24 hours): Intake/Output Summary (Last 24 hours) at 10/26/17 2059  Last data filed at 10/26/17 1300   Gross per 24 hour   Intake              150 ml   Output                0 ml   Net              150 ml       Physical Exam:     Physical Exam    Nc, At, PERRL, EOMI, anicteric  MMM, no oral lesions  Chest: decreased with some upper airway rhonchi  CVS: RRR, S1, S2  Abd: soft, Nt, NT  Ext : No C,C, E  Neuro: pleasantly confused  Additional Data:     Labs:      Results from last 7 days  Lab Units 10/26/17  0629   WBC Thousand/uL 4 67   HEMOGLOBIN g/dL 10 8*   HEMATOCRIT % 33 0*   PLATELETS Thousands/uL 117*       Results from last 7 days  Lab Units 10/25/17  0536  10/21/17  1426   SODIUM mmol/L 143  < >  --    POTASSIUM mmol/L 3 4*  < >  --    CHLORIDE mmol/L 108  < >  --    CO2 mmol/L 28  < >  --    BUN mg/dL 17  < >  --    CREATININE mg/dL 0 95  < >  --    CALCIUM mg/dL 9 0  < >  --    TOTAL PROTEIN g/dL  --   --  4 6*   BILIRUBIN TOTAL mg/dL  --   --  1 20*   ALK PHOS U/L  --   --  70   ALT U/L  --   --  19   AST U/L  --   --  30   GLUCOSE RANDOM mg/dL 115  < >  --    < > = values in this interval not displayed  Results from last 7 days  Lab Units 10/21/17  1426   INR  1 66*       * I Have Reviewed All Lab Data Listed Above  * Additional Pertinent Lab Tests Reviewed:  All Labs Within Last 24 Hours Reviewed    Imaging:    Imaging Reports Reviewed Today Include: none  Imaging Personally Reviewed by Myself Includes:  none    Recent Cultures (last 7 days):       Results from last 7 days  Lab Units 10/20/17  1321 10/19/17  2335   SPUTUM CULTURE  1+ Growth of   --    GRAM STAIN RESULT  2+ Epithelial cells per low power field  1+ Polys  1+ Gram positive cocci in pairs  --    LEGIONELLA URINARY ANTIGEN   --  Negative       Last 24 Hours Medication List:     aspirin 81 mg Oral Daily   bimatoprost 1 drop Both Eyes HS   cefepime 2,000 mg Intravenous Q12H   cholecalciferol 2,000 Units Oral Daily   cyanocobalamin 250 mcg Oral Daily   donepezil 10 mg Oral HS   dorzolamide 1 drop Both Eyes BID   guaiFENesin 600 mg Oral Q12H SONY   ipratropium 0 5 mg Nebulization TID   levalbuterol 1 25 mg Nebulization TID   memantine 10 mg Oral BID   metroNIDAZOLE 500 mg Oral [facility]        Today, Patient Was Seen By: Elissa Kelly MD Pager : [phone]

## 2017-10-27 NOTE — PLAN OF CARE
Problem: DISCHARGE PLANNING - CARE MANAGEMENT  Goal: Discharge to post-acute care or home with appropriate resources  INTERVENTIONS:  - Conduct assessment to determine patient/family and health care team treatment goals, and need for post-acute services based on payer coverage, community resources, and patient preferences, and barriers to discharge  - Address psychosocial, clinical, and financial barriers to discharge as identified in assessment in conjunction with the patient/family and health care team  - Arrange appropriate level of post-acute services according to patient's   needs and preference and payer coverage in collaboration with the physician and health care team  - Communicate with and update the patient/family, physician, and health care team regarding progress on the discharge plan  - Arrange appropriate transportation to post-acute venues    Outcome: Progressing  CM met with pt at bedside  Pt is a possible d/c Monday to [facility]  IMM reviewed with pt  Questions answered  Pt signed IMM  Copy to pt and copy to MR for scanning

## 2017-10-28 ENCOUNTER — APPOINTMENT (INPATIENT)
Dept: RADIOLOGY | Facility: HOSPITAL | Age: 82
DRG: 871 | End: 2017-10-28
Payer: MEDICARE

## 2017-10-28 PROBLEM — A41.9 SEPSIS (HCC): Status: RESOLVED | Noted: 2017-10-25 | Resolved: 2017-10-28

## 2017-10-28 PROBLEM — L89.303 DECUBITUS ULCER OF BUTTOCK, STAGE 3 (HCC): Status: RESOLVED | Noted: 2017-10-25 | Resolved: 2017-10-28

## 2017-10-28 LAB
APTT PPP: 87 SECONDS (ref 23–35)
ERYTHROCYTE [DISTWIDTH] IN BLOOD BY AUTOMATED COUNT: 12.9 % (ref 11.6–15.1)
HCT VFR BLD AUTO: 31.6 % (ref 36.5–49.3)
HGB BLD-MCNC: 10.4 G/DL (ref 12–17)
MCH RBC QN AUTO: 31.5 PG (ref 26.8–34.3)
MCHC RBC AUTO-ENTMCNC: 32.9 G/DL (ref 31.4–37.4)
MCV RBC AUTO: 96 FL (ref 82–98)
PLATELET # BLD AUTO: 156 THOUSANDS/UL (ref 149–390)
PMV BLD AUTO: 9.5 FL (ref 8.9–12.7)
RBC # BLD AUTO: 3.3 MILLION/UL (ref 3.88–5.62)
WBC # BLD AUTO: 4.43 THOUSAND/UL (ref 4.31–10.16)

## 2017-10-28 PROCEDURE — 94760 N-INVAS EAR/PLS OXIMETRY 1: CPT

## 2017-10-28 PROCEDURE — 85730 THROMBOPLASTIN TIME PARTIAL: CPT | Performed by: INTERNAL MEDICINE

## 2017-10-28 PROCEDURE — 71010 HB CHEST X-RAY 1 VIEW FRONTAL (PORTABLE): CPT

## 2017-10-28 PROCEDURE — 94640 AIRWAY INHALATION TREATMENT: CPT

## 2017-10-28 PROCEDURE — 85027 COMPLETE CBC AUTOMATED: CPT | Performed by: INTERNAL MEDICINE

## 2017-10-28 RX ADMIN — CYANOCOBALAMIN TAB 500 MCG 250 MCG: 500 TAB at 08:19

## 2017-10-28 RX ADMIN — CHOLECALCIFEROL TAB 25 MCG (1000 UNIT) 2000 UNITS: 25 TAB at 08:19

## 2017-10-28 RX ADMIN — LEVALBUTEROL HYDROCHLORIDE 1.25 MG: 1.25 SOLUTION, CONCENTRATE RESPIRATORY (INHALATION) at 07:25

## 2017-10-28 RX ADMIN — GUAIFENESIN 600 MG: 600 TABLET, EXTENDED RELEASE ORAL at 08:19

## 2017-10-28 RX ADMIN — METRONIDAZOLE 500 MG: 500 TABLET ORAL at 21:11

## 2017-10-28 RX ADMIN — DORZOLAMIDE HYDROCHLORIDE 1 DROP: 20 SOLUTION/ DROPS OPHTHALMIC at 16:46

## 2017-10-28 RX ADMIN — GUAIFENESIN 600 MG: 600 TABLET, EXTENDED RELEASE ORAL at 21:12

## 2017-10-28 RX ADMIN — MEMANTINE HYDROCHLORIDE 10 MG: 5 TABLET ORAL at 08:19

## 2017-10-28 RX ADMIN — CEFEPIME HYDROCHLORIDE 2000 MG: 2 INJECTION, POWDER, FOR SOLUTION INTRAVENOUS at 22:44

## 2017-10-28 RX ADMIN — IPRATROPIUM BROMIDE 0.5 MG: 0.5 SOLUTION RESPIRATORY (INHALATION) at 14:02

## 2017-10-28 RX ADMIN — DONEPEZIL HYDROCHLORIDE 10 MG: 5 TABLET ORAL at 21:11

## 2017-10-28 RX ADMIN — IPRATROPIUM BROMIDE 0.5 MG: 0.5 SOLUTION RESPIRATORY (INHALATION) at 20:05

## 2017-10-28 RX ADMIN — MEMANTINE HYDROCHLORIDE 10 MG: 5 TABLET ORAL at 16:46

## 2017-10-28 RX ADMIN — METRONIDAZOLE 500 MG: 500 TABLET ORAL at 05:35

## 2017-10-28 RX ADMIN — DORZOLAMIDE HYDROCHLORIDE 1 DROP: 20 SOLUTION/ DROPS OPHTHALMIC at 08:20

## 2017-10-28 RX ADMIN — ASPIRIN 81 MG 81 MG: 81 TABLET ORAL at 08:19

## 2017-10-28 RX ADMIN — CEFEPIME HYDROCHLORIDE 2000 MG: 2 INJECTION, POWDER, FOR SOLUTION INTRAVENOUS at 08:25

## 2017-10-28 RX ADMIN — LEVALBUTEROL HYDROCHLORIDE 1.25 MG: 1.25 SOLUTION, CONCENTRATE RESPIRATORY (INHALATION) at 14:02

## 2017-10-28 RX ADMIN — IPRATROPIUM BROMIDE 0.5 MG: 0.5 SOLUTION RESPIRATORY (INHALATION) at 07:25

## 2017-10-28 RX ADMIN — BIMATOPROST 1 DROP: 0.1 SOLUTION/ DROPS OPHTHALMIC at 21:12

## 2017-10-28 RX ADMIN — LEVALBUTEROL HYDROCHLORIDE 1.25 MG: 1.25 SOLUTION, CONCENTRATE RESPIRATORY (INHALATION) at 20:05

## 2017-10-28 RX ADMIN — METRONIDAZOLE 500 MG: 500 TABLET ORAL at 13:44

## 2017-10-28 NOTE — ASSESSMENT & PLAN NOTE
· Assessment/plan:  History of stage III decubitus on admission noted as healed    ·   Continue turn the patient q 2 hours

## 2017-10-28 NOTE — ASSESSMENT & PLAN NOTE
Assessment/plan:  · Patient currently on argatroban protocol, platelets increasing back to normal   · Heparin antibody serotonin screen pending rapid screen was negative    0 10 and screen pendingIf serotonin screen positive will consult Hematology  · Continue to monitor platelet count- they have improved, now above 100

## 2017-10-28 NOTE — ASSESSMENT & PLAN NOTE
Assessment/Plan:  Likely gram-negative anaerobic associated pneumonia  Day number 10 of cefepime and Flagyl  · Discontinue antibiotic therapy for now, total 10 days completed  · Continue aggressive pulmonary toilet with Xopenex, Atrovent and Mucinex  · The chest x-ray on 10/23/2017 shows persistent infiltrate, chest x-ray shows  · Noted speech eval with chronic aspiration and patient desire for more normalized diet  Discussed future options with son , started palliative conversation regarding recurrent pneumonias

## 2017-10-28 NOTE — ASSESSMENT & PLAN NOTE
Assessment/Plan:  Likely gram-negative anaerobic associated pneumonia  Day number 9 of cefepime and Flagyl  · Continue antibiotic therapy for now, total 10 days based on clinical exam   · Continue aggressive pulmonary toilet with Xopenex, Atrovent and Mucinex  · The chest x-ray on 10/23/2017 shows persistent infiltrate, will reassess in a m  Colleen Porter · Noted speech eval with chronic aspiration and patient desire for more normalized diet  Discussed future options with son , started palliative conversation regarding recurrent pneumonias

## 2017-10-28 NOTE — SUBJECTIVE & OBJECTIVE
VTE Pharmacologic Prophylaxis:   Pharmacologic: Argatroban  Mechanical: Mechanical VTE prophylaxis in place  Patient Centered Rounds: I have evaluated patient without nursing staff present due to I have updated the nursing staff outside the room  Discussions with Specialists or Other Care Team Provider:  None  Education and Discussions with Family / Patient:  Discussed with son day before will update again in a m  Time Spent for Care: 30 minutes  Current Length of Stay: 9 day(s)  Current Patient Status: Inpatient   Certification Statement: The patient will continue to require additional inpatient hospital stay due to Awaiting continued testing and placement    Discharge Plan: To be determined  Code Status: Level 3 - DNAR and DNI    Subjective:   Patient notably coughing today during my interview  Reports no complaints is confused at baseline pleasantly  Objective:   Vitals:   Temperature:  97 6  HR:  [65-72] 6  Resp:  [18] 18  BP: (128-131)/(58-62) 147/65  SpO2:  [95 %-96 %] 94 %  Body mass index is 22 85 kg/m²           Physical Exam:     Physical Exam general:  Pleasantly confused no acute distress  Normocephalic atraumatic pupils equal round and reactive to light extraocular muscles appear to be intact mucous membranes are moist there is no oral lesions dentition is poor  Chest decreased poor air entry no specific wheezes or rhonchi although patient is coughing with upper airway symptoms  Cardiovascular regular rate and rhythm positive S1 and S2 heard appreciate an S3-S4 murmur or gallop  Extremities no clubbing cyanosis edema  Neurologic the patient is can oriented to self only    Additional Data:   Labs:      Results from last 7 days  Lab Units 10/27/17  0258  10/21/17  1426   SODIUM mmol/L 142  < >  --    POTASSIUM mmol/L 4 1  < >  --    CHLORIDE mmol/L 107  < >  --    CO2 mmol/L 28  < >  --    BUN mg/dL 20  < >  --    CREATININE mg/dL 0 96  < >  --    CALCIUM mg/dL 8 9  < >  --    TOTAL PROTEIN g/dL  --   --  4 6*   BILIRUBIN TOTAL mg/dL  --   --  1 20*   ALK PHOS U/L  --   --  70   ALT U/L  --   --  19   AST U/L  --   --  30   GLUCOSE RANDOM mg/dL 119  < >  --    < > = values in this interval not displayed  Results from last 7 days  Lab Units 10/21/17  1426   INR  1 66*     WBC count 5 01, hemoglobin 11 1 hematocrit 34 2, and platelets 708    * I Have Reviewed All Lab Data Listed Above  * Additional Pertinent Lab Tests Reviewed: All Labs Within Last 24 Hours Reviewed    Imaging:    Imaging Reports Reviewed Today Include:  None  Cultures:   Blood Culture:   Lab Results   Component Value Date    BLOODCX No Growth After 5 Days  10/19/2017    BLOODCX No Growth After 5 Days  10/19/2017    BLOODCX No Growth After 5 Days  02/06/2017    BLOODCX No Growth After 5 Days  02/06/2017    BLOODCX No Growth After 5 Days  02/02/2017    BLOODCX No Growth After 5 Days   02/02/2017     Urine Culture: No results found for: URINECX  Sputum Culture: No components found for: SPUTUMCX  Wound Culture: No results found for: WOUNDCULT    Last 24 Hours Medication List:     aspirin 81 mg Oral Daily   bimatoprost 1 drop Both Eyes HS   cefepime 2,000 mg Intravenous Q12H   cholecalciferol 2,000 Units Oral Daily   cyanocobalamin 250 mcg Oral Daily   donepezil 10 mg Oral HS   dorzolamide 1 drop Both Eyes BID   guaiFENesin 600 mg Oral Q12H SONY   ipratropium 0 5 mg Nebulization TID   levalbuterol 1 25 mg Nebulization TID   memantine 10 mg Oral BID   metroNIDAZOLE 500 mg Oral Q8H [facility]        Today, Patient Was Seen By: Armando De La Cruz MD

## 2017-10-28 NOTE — PROGRESS NOTES
Progress Note - Michoacano Marshall 80 y o  male MRN: [id number]    Unit/Bed#: -01 Encounter: [id number]        * Pneumonia   Assessment & Plan      Assessment/Plan:  Likely gram-negative anaerobic associated pneumonia  Day number 9 of cefepime and Flagyl  · Continue antibiotic therapy for now, total 10 days based on clinical exam   · Continue aggressive pulmonary toilet with Xopenex, Atrovent and Mucinex  · The chest x-ray on 10/23/2017 shows persistent infiltrate, will reassess in a serenity Molina · Noted speech eval with chronic aspiration and patient desire for more normalized diet  Discussed future options with son , started palliative conversation regarding recurrent pneumonias  Sepsis (HCC)resolved as of 10/28/2017   Assessment & Plan      Assessment/Plan:  Sepsis secondary to gram-negative pneumonia currently resolved:  · Continue treatment of pneumonia        Thrombocytopenia ([facility])   Assessment & Plan    Assessment/plan:  · Patient currently on argatroban protocol, platelets increasing back to normal   · Heparin antibody serotonin screen pending rapid screen was negative  0 10 and screen pendingIf serotonin screen positive will consult Hematology  · Continue to monitor platelet count- they have improved, now above 100        Decubitus ulcer of buttock, stage 3 (HCC)   Assessment & Plan    · Assessment/plan:  History of stage III decubitus on admission noted as healed  ·   Continue turn the patient q 2 hours        Personal history of CLL (chronic lymphocytic leukemia)   Assessment & Plan    Assessment/plan:  At baseline  · Continue to monitor blood counts, no change        Glaucoma   Assessment & Plan    Assessment/plan:  · Continue eyedrops at baseline, no change        Dementia   Assessment & Plan    Assessment/plan:  · At baseline continue Namenda, Aricept  No change              VTE Pharmacologic Prophylaxis:   Pharmacologic: Argatroban  Mechanical: Mechanical VTE prophylaxis in place      Patient Centered Rounds: I have evaluated patient without nursing staff present due to I have updated the nursing staff outside the room  Discussions with Specialists or Other Care Team Provider:  None  Education and Discussions with Family / Patient:  Discussed with son day before will update again in a m  Time Spent for Care: 30 minutes  Current Length of Stay: 9 day(s)  Current Patient Status: Inpatient   Certification Statement: The patient will continue to require additional inpatient hospital stay due to Awaiting continued testing and placement    Discharge Plan: To be determined  Code Status: Level 3 - DNAR and DNI    Subjective:   Patient notably coughing today during my interview  Reports no complaints is confused at baseline pleasantly  Objective:   Vitals:   Temperature:  97 6  HR:  [65-72] 6  Resp:  [18] 18  BP: (128-131)/(58-62) 147/65  SpO2:  [95 %-96 %] 94 %  Body mass index is 22 85 kg/m²           Physical Exam:     Physical Exam general:  Pleasantly confused no acute distress  Normocephalic atraumatic pupils equal round and reactive to light extraocular muscles appear to be intact mucous membranes are moist there is no oral lesions dentition is poor  Chest decreased poor air entry no specific wheezes or rhonchi although patient is coughing with upper airway symptoms  Cardiovascular regular rate and rhythm positive S1 and S2 heard appreciate an S3-S4 murmur or gallop  Extremities no clubbing cyanosis edema  Neurologic the patient is can oriented to self only    Additional Data:   Labs:      Results from last 7 days  Lab Units 10/27/17  0258  10/21/17  1426   SODIUM mmol/L 142  < >  --    POTASSIUM mmol/L 4 1  < >  --    CHLORIDE mmol/L 107  < >  --    CO2 mmol/L 28  < >  --    BUN mg/dL 20  < >  --    CREATININE mg/dL 0 96  < >  --    CALCIUM mg/dL 8 9  < >  --    TOTAL PROTEIN g/dL  --   --  4 6*   BILIRUBIN TOTAL mg/dL  --   --  1 20*   ALK PHOS U/L  --   --  70   ALT U/L  --   --  19   AST U/L --   --  30   GLUCOSE RANDOM mg/dL 119  < >  --    < > = values in this interval not displayed  Results from last 7 days  Lab Units 10/21/17  1426   INR  1 66*     WBC count 5 01, hemoglobin 11 1 hematocrit 34 2, and platelets 696    * I Have Reviewed All Lab Data Listed Above  * Additional Pertinent Lab Tests Reviewed: All Labs Within Last 24 Hours Reviewed    Imaging:    Imaging Reports Reviewed Today Include:  None  Cultures:   Blood Culture:   Lab Results   Component Value Date    BLOODCX No Growth After 5 Days  10/19/2017    BLOODCX No Growth After 5 Days  10/19/2017    BLOODCX No Growth After 5 Days  02/06/2017    BLOODCX No Growth After 5 Days  02/06/2017    BLOODCX No Growth After 5 Days  02/02/2017    BLOODCX No Growth After 5 Days   02/02/2017     Urine Culture: No results found for: URINECX  Sputum Culture: No components found for: SPUTUMCX  Wound Culture: No results found for: WOUNDCULT    Last 24 Hours Medication List:     aspirin 81 mg Oral Daily   bimatoprost 1 drop Both Eyes HS   cefepime 2,000 mg Intravenous Q12H   cholecalciferol 2,000 Units Oral Daily   cyanocobalamin 250 mcg Oral Daily   donepezil 10 mg Oral HS   dorzolamide 1 drop Both Eyes BID   guaiFENesin 600 mg Oral Q12H SONY   ipratropium 0 5 mg Nebulization TID   levalbuterol 1 25 mg Nebulization TID   memantine 10 mg Oral BID   metroNIDAZOLE 500 mg Oral Q8H [address]        Today, Patient Was Seen By: Vince Mario MD

## 2017-10-28 NOTE — ASSESSMENT & PLAN NOTE
Assessment/plan:  · Heparin antibody serotonin screen was negative, rapid screen was negative  Continue to monitor platelet count- they have improved, now above 100    · Discontinue argatroban

## 2017-10-29 PROCEDURE — 94640 AIRWAY INHALATION TREATMENT: CPT

## 2017-10-29 PROCEDURE — 94760 N-INVAS EAR/PLS OXIMETRY 1: CPT

## 2017-10-29 RX ADMIN — LEVALBUTEROL HYDROCHLORIDE 1.25 MG: 1.25 SOLUTION, CONCENTRATE RESPIRATORY (INHALATION) at 08:14

## 2017-10-29 RX ADMIN — GUAIFENESIN 600 MG: 600 TABLET, EXTENDED RELEASE ORAL at 22:47

## 2017-10-29 RX ADMIN — IPRATROPIUM BROMIDE 0.5 MG: 0.5 SOLUTION RESPIRATORY (INHALATION) at 08:14

## 2017-10-29 RX ADMIN — MEMANTINE HYDROCHLORIDE 10 MG: 5 TABLET ORAL at 17:30

## 2017-10-29 RX ADMIN — CEFEPIME HYDROCHLORIDE 2000 MG: 2 INJECTION, POWDER, FOR SOLUTION INTRAVENOUS at 08:17

## 2017-10-29 RX ADMIN — METRONIDAZOLE 500 MG: 500 TABLET ORAL at 22:47

## 2017-10-29 RX ADMIN — CYANOCOBALAMIN TAB 500 MCG 250 MCG: 500 TAB at 08:12

## 2017-10-29 RX ADMIN — METRONIDAZOLE 500 MG: 500 TABLET ORAL at 13:09

## 2017-10-29 RX ADMIN — GUAIFENESIN 600 MG: 600 TABLET, EXTENDED RELEASE ORAL at 08:12

## 2017-10-29 RX ADMIN — DORZOLAMIDE HYDROCHLORIDE 1 DROP: 20 SOLUTION/ DROPS OPHTHALMIC at 08:31

## 2017-10-29 RX ADMIN — BIMATOPROST 1 DROP: 0.1 SOLUTION/ DROPS OPHTHALMIC at 22:48

## 2017-10-29 RX ADMIN — ASPIRIN 81 MG 81 MG: 81 TABLET ORAL at 08:12

## 2017-10-29 RX ADMIN — IPRATROPIUM BROMIDE 0.5 MG: 0.5 SOLUTION RESPIRATORY (INHALATION) at 13:55

## 2017-10-29 RX ADMIN — MEMANTINE HYDROCHLORIDE 10 MG: 5 TABLET ORAL at 08:12

## 2017-10-29 RX ADMIN — CEFEPIME HYDROCHLORIDE 2000 MG: 2 INJECTION, POWDER, FOR SOLUTION INTRAVENOUS at 22:47

## 2017-10-29 RX ADMIN — LEVALBUTEROL HYDROCHLORIDE 1.25 MG: 1.25 SOLUTION, CONCENTRATE RESPIRATORY (INHALATION) at 19:53

## 2017-10-29 RX ADMIN — IPRATROPIUM BROMIDE 0.5 MG: 0.5 SOLUTION RESPIRATORY (INHALATION) at 19:53

## 2017-10-29 RX ADMIN — METRONIDAZOLE 500 MG: 500 TABLET ORAL at 05:33

## 2017-10-29 RX ADMIN — DORZOLAMIDE HYDROCHLORIDE 1 DROP: 20 SOLUTION/ DROPS OPHTHALMIC at 17:30

## 2017-10-29 RX ADMIN — CHOLECALCIFEROL TAB 25 MCG (1000 UNIT) 2000 UNITS: 25 TAB at 08:12

## 2017-10-29 RX ADMIN — DONEPEZIL HYDROCHLORIDE 10 MG: 5 TABLET ORAL at 22:47

## 2017-10-29 RX ADMIN — LEVALBUTEROL HYDROCHLORIDE 1.25 MG: 1.25 SOLUTION, CONCENTRATE RESPIRATORY (INHALATION) at 13:55

## 2017-10-30 VITALS
SYSTOLIC BLOOD PRESSURE: 141 MMHG | RESPIRATION RATE: 20 BRPM | HEIGHT: 74 IN | OXYGEN SATURATION: 97 % | DIASTOLIC BLOOD PRESSURE: 63 MMHG | HEART RATE: 68 BPM | BODY MASS INDEX: 22.84 KG/M2 | TEMPERATURE: 97.8 F | WEIGHT: 178 LBS

## 2017-10-30 PROCEDURE — 94668 MNPJ CHEST WALL SBSQ: CPT

## 2017-10-30 PROCEDURE — 94640 AIRWAY INHALATION TREATMENT: CPT

## 2017-10-30 PROCEDURE — 97110 THERAPEUTIC EXERCISES: CPT

## 2017-10-30 PROCEDURE — 94760 N-INVAS EAR/PLS OXIMETRY 1: CPT

## 2017-10-30 PROCEDURE — 97116 GAIT TRAINING THERAPY: CPT

## 2017-10-30 RX ORDER — GUAIFENESIN 600 MG
600 TABLET, EXTENDED RELEASE 12 HR ORAL EVERY 12 HOURS SCHEDULED
Qty: 60 TABLET | Refills: 0 | Status: SHIPPED | OUTPATIENT
Start: 2017-10-30

## 2017-10-30 RX ORDER — LEVALBUTEROL 1.25 MG/.5ML
1.25 SOLUTION, CONCENTRATE RESPIRATORY (INHALATION)
Qty: 1 EACH | Refills: 0 | Status: SHIPPED | OUTPATIENT
Start: 2017-10-30 | End: 2018-05-06

## 2017-10-30 RX ORDER — LEVALBUTEROL 1.25 MG/.5ML
1.25 SOLUTION, CONCENTRATE RESPIRATORY (INHALATION)
Status: DISCONTINUED | OUTPATIENT
Start: 2017-10-30 | End: 2017-10-30 | Stop reason: HOSPADM

## 2017-10-30 RX ADMIN — GUAIFENESIN 600 MG: 600 TABLET, EXTENDED RELEASE ORAL at 09:23

## 2017-10-30 RX ADMIN — MEMANTINE HYDROCHLORIDE 10 MG: 5 TABLET ORAL at 09:23

## 2017-10-30 RX ADMIN — IPRATROPIUM BROMIDE 0.5 MG: 0.5 SOLUTION RESPIRATORY (INHALATION) at 10:26

## 2017-10-30 RX ADMIN — CHOLECALCIFEROL TAB 25 MCG (1000 UNIT) 2000 UNITS: 25 TAB at 09:23

## 2017-10-30 RX ADMIN — CEFEPIME HYDROCHLORIDE 2000 MG: 2 INJECTION, POWDER, FOR SOLUTION INTRAVENOUS at 09:23

## 2017-10-30 RX ADMIN — METRONIDAZOLE 500 MG: 500 TABLET ORAL at 05:24

## 2017-10-30 RX ADMIN — CYANOCOBALAMIN TAB 500 MCG 250 MCG: 500 TAB at 09:23

## 2017-10-30 RX ADMIN — ASPIRIN 81 MG 81 MG: 81 TABLET ORAL at 09:23

## 2017-10-30 RX ADMIN — LEVALBUTEROL HYDROCHLORIDE 1.25 MG: 1.25 SOLUTION, CONCENTRATE RESPIRATORY (INHALATION) at 10:26

## 2017-10-30 RX ADMIN — DORZOLAMIDE HYDROCHLORIDE 1 DROP: 20 SOLUTION/ DROPS OPHTHALMIC at 09:25

## 2017-10-30 NOTE — PLAN OF CARE
DISCHARGE PLANNING     Discharge to home or other facility with appropriate resources Completed        HEMATOLOGIC - ADULT     Maintains hematologic stability Completed        Knowledge Deficit     Patient/family/caregiver demonstrates understanding of disease process, treatment plan, medications, and discharge instructions Completed        MUSCULOSKELETAL - ADULT     Maintain or return mobility to safest level of function Completed        NEUROSENSORY - ADULT     Achieves stable or improved neurological status Completed        Nutrition/Hydration-ADULT     Nutrient/Hydration intake appropriate for improving, restoring or maintaining nutritional needs Completed        Potential for Falls     Patient will remain free of falls Completed        Prexisting or High Potential for Compromised Skin Integrity     Skin integrity is maintained or improved Completed        RESPIRATORY - ADULT     Achieves optimal ventilation and oxygenation Completed        SAFETY ADULT     Maintain or return to baseline ADL function Completed     Maintain or return mobility status to optimal level Completed        SKIN/TISSUE INTEGRITY - ADULT     Skin integrity remains intact Completed     Incision(s), wounds(s) or drain site(s) healing without S/S of infection Completed

## 2017-10-30 NOTE — PROGRESS NOTES
[facility] Progress Note  Patient: Mili Sutton 80 y o  male   MRN: [id number]  PCP: Kodak Alvarado DO  Unit/Bed#: [facility] Encounter: [id number]  Date Of Visit: 10/29/17    Assessment:    Principal Problem:    Pneumonia  Active Problems: Thrombocytopenia ([address] )    Dementia    Glaucoma    Personal history of CLL (chronic lymphocytic leukemia)      Plan:    · Pneumonia with gram-negative organisms likely secondary to chronic aspiration:  Patient improving chest x-ray shows almost resolution  Antibiotics will be discontinued  · Thrombocytopenia has resolved: Both test for heparin-induced thrombocytopenia were negative are Atrovent was discontinued  For now will use SCDs for DVT prophylaxis and hold off on any systemic agents  · Dementia continue Namenda and Aricept  Patient will hopefully improved back to baseline when he gets back to [facility] but will need to go to [address] for rehab  · History of CLL:  Currently counts are okay will continued intermittently  monitor them  · Decubitus previously reported as resolved on this admission        VTE Pharmacologic Prophylaxis:   Pharmacologic: Pharmacologic VTE Prophylaxis contraindicated due to Holding off on further systemic anticoagulation due to recent thrombocytopenia  Mechanical VTE Prophylaxis in Place: Yes    Patient Centered Rounds: I have performed bedside rounds with nursing staff today  Discussions with Specialists or Other Care Team Provider:  None    Education and Discussions with Family / Patient:  Updated son    Time Spent for Care: 30 minutes  More than 50% of total time spent on counseling and coordination of care as described above      Current Length of Stay: 11 day(s)    Current Patient Status: Inpatient   Certification Statement: The patient will continue to require additional inpatient hospital stay due to For rehab placement on Monday    Discharge Plan / Estimated Discharge Date:  Hopeful for Monday discharge to [facility]    Code Status: Level 3 - DNAR and DNI      Subjective:   Patient is pleasantly confused offers no new complaints  Objective:     Vitals:   Temp (24hrs), Av 9 °F (36 6 °C), Min:97 8 °F (36 6 °C), Max:97 9 °F (36 6 °C)    HR:  [64-68] 68  Resp:  [20] 20  BP: (141-149)/(63-70) 141/63  SpO2:  [95 %-98 %] 97 %  Body mass index is 22 85 kg/m²  Input and Output Summary (last 24 hours): Intake/Output Summary (Last 24 hours) at 10/30/17 1032  Last data filed at 10/30/17 0700   Gross per 24 hour   Intake              420 ml   Output              550 ml   Net             -130 ml       Physical Exam:     Physical Exam    Exam remains normocephalic atraumatic pupils equal round and reactive to light extraocular muscles intact mucous membranes are moist neck is supple there is no JVD no lymph nodes no carotid bruits chest is decreased but clear to auscultation is no rhonchi rales or wheezes his cough is less today  Cardiovascular regular rhythm positive S1 and S2 heard appreciate an S3 or S4  Abdomen soft nontender nondistended with positive bowel sounds extremities show no clubbing cyanosis edema  Neurologically the patient is at baseline with confusion    Additional Data:     Labs:      Results from last 7 days  Lab Units 10/28/17  0345   WBC Thousand/uL 4 43   HEMOGLOBIN g/dL 10 4*   HEMATOCRIT % 31 6*   PLATELETS Thousands/uL 156       Results from last 7 days  Lab Units 10/27/17  0258   SODIUM mmol/L 142   POTASSIUM mmol/L 4 1   CHLORIDE mmol/L 107   CO2 mmol/L 28   BUN mg/dL 20   CREATININE mg/dL 0 96   CALCIUM mg/dL 8 9   GLUCOSE RANDOM mg/dL 119           * I Have Reviewed All Lab Data Listed Above    * Additional Pertinent Lab Tests Reviewed: No New Labs Available For Today    Imaging:    Imaging Reports Reviewed Today Include:  None  Imaging Personally Reviewed by Myself Includes:  None    Recent Cultures (last 7 days):           Last 24 Hours Medication List: aspirin 81 mg Oral Daily   bimatoprost 1 drop Both Eyes HS   cholecalciferol 2,000 Units Oral Daily   cyanocobalamin 250 mcg Oral Daily   donepezil 10 mg Oral HS   dorzolamide 1 drop Both Eyes BID   guaiFENesin 600 mg Oral Q12H SONY   ipratropium 0 5 mg Nebulization BID   levalbuterol 1 25 mg Nebulization BID   memantine 10 mg Oral BID        Today, Patient Was Seen By: Lorraine Brand MD Pager : [phone]

## 2017-10-30 NOTE — PLAN OF CARE
DISCHARGE PLANNING     Discharge to home or other facility with appropriate resources Progressing        HEMATOLOGIC - ADULT     Maintains hematologic stability Progressing        Knowledge Deficit     Patient/family/caregiver demonstrates understanding of disease process, treatment plan, medications, and discharge instructions Progressing        MUSCULOSKELETAL - ADULT     Maintain or return mobility to safest level of function Progressing        NEUROSENSORY - ADULT     Achieves stable or improved neurological status Progressing        Nutrition/Hydration-ADULT     Nutrient/Hydration intake appropriate for improving, restoring or maintaining nutritional needs Progressing        Potential for Falls     Patient will remain free of falls Progressing        Prexisting or High Potential for Compromised Skin Integrity     Skin integrity is maintained or improved Progressing        RESPIRATORY - ADULT     Achieves optimal ventilation and oxygenation Progressing        SAFETY ADULT     Maintain or return to baseline ADL function Progressing     Maintain or return mobility status to optimal level Progressing        SKIN/TISSUE INTEGRITY - ADULT     Skin integrity remains intact Progressing     Incision(s), wounds(s) or drain site(s) healing without S/S of infection Progressing

## 2017-10-30 NOTE — PLAN OF CARE
Problem: DISCHARGE PLANNING - CARE MANAGEMENT  Goal: Discharge to post-acute care or home with appropriate resources  INTERVENTIONS:  - Conduct assessment to determine patient/family and health care team treatment goals, and need for post-acute services based on payer coverage, community resources, and patient preferences, and barriers to discharge  - Address psychosocial, clinical, and financial barriers to discharge as identified in assessment in conjunction with the patient/family and health care team  - Arrange appropriate level of post-acute services according to patient's   needs and preference and payer coverage in collaboration with the physician and health care team  - Communicate with and update the patient/family, physician, and health care team regarding progress on the discharge plan  - Arrange appropriate transportation to post-acute venues    Outcome: Completed Date Met: 10/30/17  CM met with pt at bedside  IMM already completed  [facility] set up for 2:15pm   Son and nurse notified of time  Medical Nec Form completed and placed in pt's chart

## 2017-10-30 NOTE — SOCIAL WORK
CM met with pt at bedside  IMM already completed  [facility] set up for 2:15pm   Son and nurse notified of time  Medical Nec Form completed and placed in pt's chart

## 2017-10-30 NOTE — SOCIAL WORK
[phone], and [facility] [phone] all have stated they cannot accommodate transport today  CM will continue to schedule transport

## 2017-10-30 NOTE — DISCHARGE SUMMARY
Discharge Summary - [facility]    Patient Information: Kathleen Cousins 80 y o  male MRN: [id number]  Unit/Bed#: -01 Encounter: [id number]    Discharging Physician / Practitioner: Terra Lizarraga MD  PCP: Buddy Caicedo DO  Admission Date: 10/19/2017  Discharge Date: 10/30/17    Reason for Admission:  Shortness of breath    Discharge Diagnoses:     Principal Problem:    Pneumonia:  Patient was treated for aspiration pneumonia presumed gram-negative origin  Chest x-ray at the time of discharge showed near resolution  Antibiotics discontinued the time of discharge  Patient desires to eat non modified diet  Discussed with patient's son regarding possible palliative medicine consultation for goals of care as aspiration will result in recurrent pneumonias  Active Problems:  ·   Thrombocytopenia [facility]):  Patient was thought to have had heparin-induced thrombocytopenia come from an chasity tests have returned showing no grounds for this diagnosis  Argatroban was discontinued  Patient has known CLL which may be the source for the thrombocytopenia at this time is clinically stable without bleeding  Patient reported to have some splenomegaly which may account for thrombocyte loss  ·   Dementia: At baseline continue Namenda and Aricept  Hopeful for brief stay at [facility] for rehab and then return [facility]   which is what he last recognizes as home  ·   Glaucoma:  Continue eyedrops  ·   Personal history of CLL (chronic lymphocytic leukemia):  Patient will need outpatient intermittent laboratory monitoring  Resolved Problems:  ·   Sepsis [facility]):  Resolved at the time of discharge  Patient received 10 days of cefepime and Flagyl  ·   Lactic acidosis:  Resolved with sepsis  ·   Decubitus ulcer of buttock, stage 3 ([address] ):  Listed during this admission however this was an old diagnosis    No wound access at the time of evaluation will turn patient q 2h previous recurrent injury  Consultations During Hospital Stay:  · Wound care  · Pulmonary    Procedures Performed:     · Chest x-ray at discharge showed improved infiltrate, initial infiltrate was noted in the right side with multifocal multilobar pneumonia being involved  · Chest CT confirmed right-sided patchy airspace opacity concerning for multilevel infiltrate  Patient has massive splenomegaly which accounts for some of his thrombocytopenia  Patient has pectus excavatum deformity which also puts him at risk for pneumonia    Significant Findings / Test Results:     · Multilobar pneumonia  · Resolution of thrombocytopenia:  Off are gastric band patient's platelet count 275  · Discharging creatinine 0 96    Incidental Findings:   · None     Test Results Pending at Discharge (will require follow up): · None     Outpatient Tests Requested:  · None    Complications:  Possible HI T ruled out with specific antibody testing    Hospital Course:     Thad Matias is a 80 y o  male patient who originally presented to the hospital on 10/19/2017 due to shortness of breath  Patient found to have multilobar pneumonia  He was treated with multiple antibiotics including Flagyl and cefepime  The patient developed worsening thrombocytopenia which was thought to correlate with the institution of heparin  He was tested for HI TN placed on are gastric band  Patient's testing subsequently returned without support for HI T  There are gastric band was discontinued  He completed his full course of antibiotic therapy  He was assessed by speech therapy and and a modified diet was offered for chronic aspiration however the patient is declining this recommendation and his family is semi supportive of this  Patient became deconditioned during this hospital stay and will go to [facility] before returning decreased [place] as he is not able to ambulate further enough distance to qualify for [facility] park      Condition at Discharge: good Discharge Day Visit / Exam:     Subjective:  Patient pleasantly confused does understand that he is going to [facility] but attempted to explain  Reports no acute distress  Vitals: Blood Pressure: 141/63 (10/30/17 0700)  Pulse: 68 (10/30/17 0700)  Temperature: 97 8 °F (36 6 °C) (10/30/17 0700)  Temp Source: Oral (10/30/17 0700)  Respirations: 20 (10/30/17 0700)  Height: 6' 2" (188 cm) (10/19/17 1328)  Weight - Scale: 80 7 kg (178 lb) (10/19/17 1327)  SpO2: 97 % (10/30/17 1029)  Exam:   Physical Exam    Very pleasant but very confused white male in no acute distress he is normocephalic atraumatic pupils equal round and reactive to light extraocular muscles intact mucous membranes are moist there are no oral lesions neck is supple there is no JVD no lymph nodes no carotid bruits  Chest:  Clear to auscultation no rhonchi rales or wheezes  Cardiovascular regular rate rhythm positive S1 and S2 no H5-Z9 II/VI systolic ejection murmur , no gallop  Abdomen scaphoid soft nontender nondistended with positive bowel sounds no hepatosplenomegaly no guarding or rebound extremities no clubbing cyanosis     Discharge instructions/Information to patient and family:   See after visit summary for information provided to patient and family  Provisions for Follow-Up Care:  See after visit summary for information related to follow-up care and any pertinent home health orders  Disposition:     Home    For Discharges to [facility] SNF:   · [facility] - Not Applicable to this Patient    Planned Readmission:  None     Discharge Statement:  I spent 20 minutes discharging the patient  This time was spent on the day of discharge  I had direct contact with the patient on the day of discharge  Greater than 50% of the total time was spent examining patient, answering all patient questions, arranging and discussing plan of care with patient as well as directly providing post-discharge instructions  Additional time then spent on discharge activities  Discharge Medications:  See after visit summary for reconciled discharge medications provided to patient and family

## 2017-10-30 NOTE — PHYSICAL THERAPY NOTE
Physical Therapy Treatment Note:  Sanjay Ross, PT     10/30/17 1223   Pain Assessment   Pain Assessment No/denies pain   Pain Score No Pain   Restrictions/Precautions   Weight Bearing Precautions Per Order No   Braces or Orthoses (none)   Other Precautions Aspiration; Chair Alarm; Bed Alarm; Fall Risk;Multiple lines   General   Chart Reviewed Yes   Response to Previous Treatment Patient with no complaints from previous session  Family/Caregiver Present No   Cognition   Overall Cognitive Status Impaired   Arousal/Participation Alert; Responsive; Cooperative   Attention Within functional limits   Orientation Level Oriented to situation   Memory Decreased recall of biographical information;Decreased long term memory;Decreased recall of recent events;Decreased recall of precautions   Following Commands Follows one step commands with increased time or repetition   Comments (Pt seemed fearful during transfer bed to chair  )   Bed Mobility   Supine to Sit 3  Moderate assistance   Additional items Assist x 1; Increased time required;Verbal cues;LE management   Transfers   Sit to Stand 3  Moderate assistance   Additional items Assist x 1; Increased time required;Verbal cues   Stand to Sit 3  Moderate assistance   Additional items Assist x 2; Increased time required;Verbal cues   Ambulation/Elevation   Gait pattern Improper Weight shift;Decreased foot clearance;Shuffling; Short stride; Excessively slow; Step to; Forward Flexion   Gait Assistance 3  Moderate assist   Additional items Assist x 2   Assistive Device Rolling walker   Distance 3'  (EOB to recliner   Incomplete knee /hip extension noted   )   Stair Management Assistance (Posture improved with vc's  )   Balance   Static Sitting Fair   Dynamic Sitting Fair   Static Standing Poor +   Dynamic Standing Poor   Ambulatory Poor   Endurance Deficit   Endurance Deficit Yes   Activity Tolerance   Activity Tolerance Treatment limited secondary to medical complications (Comment) Medical Staff Made Aware RN - [facility] consented to allow pt to particiapte in PT 7165 No  [facility] activities  Nurse Made Aware yes   Exercises   Hip Flexion Sitting;20 reps;AROM; Bilateral   Hip Abduction Sitting;Bilateral;20 reps;AAROM   Hip Adduction Sitting;20 reps;AAROM; Bilateral   Knee AROM Short Arc Quad Sitting;20 reps;AAROM; Bilateral   Ankle Pumps Sitting;20 reps;Bilateral;AAROM   Assessment   Prognosis Fair   Problem List Decreased strength;Decreased endurance; Impaired balance;Decreased mobility; Decreased coordination;Decreased cognition; Impaired judgement;Decreased safety awareness; Impaired tone   Assessment Pt seen for physical therapy treatment consisting of completion of bed mobility activities, seated ex's, and bed to chair transfer with RW and moderate assist of 2  Pt had unusual movements in R LE ( increased tone?) during weight bearing activities  This appeared to frighten pt and cause decreased safety  Pt had significant confusion and some difficulty following commands for safe completion of mobility activities with assistance  Pt needed physical assistance to navigate the RW appropriately to complete a transfer  Longer distance ambulation was not attempted due to safety deficits present today  Unsure if pt came from skilled care in SNF environment v/s personal care setting in SNF  If pt came from skilled care, rec is for return to SNF with PT rx's to establish RNP for mobility  If pt came from personal care setting , rec is for d/c to inpatient rehab  Barriers to Discharge None   Goals   Patient Goals Pt did not express goals for PT rx's  STG Expiration Date 11/09/17   Short Term Goal #1 1  Bed mobility completed with cg assist of 1  2  Transfers with RW, fair balance, and min assist of 1  3  Pt ambulates 10-20' with RW, fair balance and improved safety   Treatment Day 2   Plan   Treatment/Interventions Functional transfer training;LE strengthening/ROM; Endurance training;Cognitive reorientation;Patient/family training;Bed mobility;Gait training;Spoke to nursing;Spoke to case management   Progress Slow progress, cognitive deficits   PT Frequency Other (Comment)  (3-5x/wk)   Recommendation   Recommendation (Long term care with PT rx v/s inpt rehab  )   Equipment Recommended [facility]   PT - OK to Discharge Yes   Additional Comments (When medically ready for d/c  )

## 2017-10-30 NOTE — SUBJECTIVE & OBJECTIVE
VTE Pharmacologic Prophylaxis:   Pharmacologic: Pharmacologic VTE Prophylaxis contraindicated due to Off are gastric band had significant thrombocytopenia with heparin so will maintain on SCDs  Mechanical: Mechanical VTE prophylaxis in place  Patient Centered Rounds: I have performed bedside rounds with nursing staff today  Discussions with Specialists or Other Care Team Provider:  None  Education and Discussions with Family / Patient:  Updated son  Time Spent for Care: 30 minutes  More than 50% of total time spent on counseling and coordination of care as described above  Current Length of Stay: 10 day(s)  Current Patient Status: Inpatient   Certification Statement: The patient will continue to require additional inpatient hospital stay due to Completing antibiotic therapy and for rehab placement    Discharge Plan:  Likely in a [facility]  Code Status: Level 3 - DNAR and DNI    Subjective:   Patient is pleasantly confused  He is however able to feed himself this evening and had no other complaints other than not understanding why he is where he is  Objective:   Vitals:   Temp (24hrs), Av 9 °F (36 6 °C), Min:97 8 °F (36 6 °C), Max:97 9 °F (36 6 °C)    HR:  [64-68] 68  Resp:  [20] 20  BP: (141-149)/(63-70) 141/63  SpO2:  [95 %-98 %] 98 %  Body mass index is 22 85 kg/m²       Input and Output Summary (last 24 hours):         Physical Exam:     Physical Exam exam remains pupils equal round and reactive to light extraocular muscles intact mucous membranes are moist neck is supple there is no JVD no lymph nodes no carotid bruits chest is decreased but clear to auscultation is no rhonchi rales or wheezes today  Cardiovascular regular rate rhythm positive S1 and S2  Abdomen soft nontender nondistended with positive bowel sounds no hepatosplenomegaly no guarding or rebound  Extremities no clubbing cyanosis edema  Neurologically the patient is oriented to himself but disoriented to time place and events    Additional Data:   Labs:    Results from last 7 days  Lab Units 10/28/17  0345   WBC Thousand/uL 4 43   HEMOGLOBIN g/dL 10 4*   HEMATOCRIT % 31 6*   PLATELETS Thousands/uL 156       Results from last 7 days  Lab Units 10/27/17  0258   SODIUM mmol/L 142   POTASSIUM mmol/L 4 1   CHLORIDE mmol/L 107   CO2 mmol/L 28   BUN mg/dL 20   CREATININE mg/dL 0 96   CALCIUM mg/dL 8 9   GLUCOSE RANDOM mg/dL 119           * I Have Reviewed All Lab Data Listed Above  * Additional Pertinent Lab Tests Reviewed: No New Labs Available For Today    Imaging:    Imaging Reports Reviewed Today Include:  None    Cultures:   Blood Culture:   Lab Results   Component Value Date    BLOODCX No Growth After 5 Days  10/19/2017    BLOODCX No Growth After 5 Days  10/19/2017    BLOODCX No Growth After 5 Days  02/06/2017    BLOODCX No Growth After 5 Days  02/06/2017    BLOODCX No Growth After 5 Days  02/02/2017    BLOODCX No Growth After 5 Days   02/02/2017     Urine Culture: No results found for: URINECX  Sputum Culture: No components found for: SPUTUMCX  Wound Culture: No results found for: WOUNDCULT    Last 24 Hours Medication List:     aspirin 81 mg Oral Daily   bimatoprost 1 drop Both Eyes HS   cholecalciferol 2,000 Units Oral Daily   cyanocobalamin 250 mcg Oral Daily   donepezil 10 mg Oral HS   dorzolamide 1 drop Both Eyes BID   guaiFENesin 600 mg Oral Q12H SONY   ipratropium 0 5 mg Nebulization TID   levalbuterol 1 25 mg Nebulization TID   memantine 10 mg Oral BID        Today, Patient Was Seen By: Kal Crouch MD

## 2017-10-30 NOTE — PLAN OF CARE
Problem: PHYSICAL THERAPY ADULT  Goal: Performs mobility at highest level of function for planned discharge setting  See evaluation for individualized goals  Treatment/Interventions: Functional transfer training, LE strengthening/ROM, Therapeutic exercise, Endurance training, Cognitive reorientation, Patient/family training, Equipment eval/education, Bed mobility, Gait training, Continued evaluation, Spoke to nursing, Spoke to case management, OT  Equipment Recommended: Tylor Espinosa (RW at this time)       See flowsheet documentation for full assessment, interventions and recommendations  Prognosis: Fair  Problem List: Decreased strength, Decreased endurance, Impaired balance, Decreased mobility, Decreased coordination, Decreased cognition, Impaired judgement, Decreased safety awareness, Impaired tone  Assessment: Pt seen for physical therapy treatment consisting of completion of bed mobility activities, seated ex's, and bed to chair transfer with RW and moderate assist of 2  Pt had unusual movements in R LE ( increased tone?) during weight bearing activities  This appeared to frighten pt and cause decreased safety  Pt had significant confusion and some difficulty following commands for safe completion of mobility activities with assistance  Pt needed physical assistance to navigate the RW appropriately to complete a transfer  Longer distance ambulation was not attempted due to safety deficits present today  Unsure if pt came from skilled care in SNF environment v/s personal care setting in SNF  If pt came from skilled care, rec is for return to SNF with PT rx's to establish RNP for mobility  If pt came from personal care setting , rec is for d/c to inpatient rehab  Barriers to Discharge: None     Recommendation:  (Long term care with PT rx v/s inpt rehab  )     PT - OK to Discharge: Yes    See flowsheet documentation for full assessment

## 2017-10-30 NOTE — PROGRESS NOTES
Progress Note - Srinivas Anderson 80 y o  male MRN: [id number]    Unit/Bed#: -01 Encounter: [id number]        * Pneumonia   Assessment & Plan      Assessment/Plan:  Likely gram-negative anaerobic associated pneumonia  Day number 10 of cefepime and Flagyl  · Discontinue antibiotic therapy for now, total 10 days completed  · Continue aggressive pulmonary toilet with Xopenex, Atrovent and Mucinex  · The chest x-ray on 10/23/2017 shows persistent infiltrate, chest x-ray shows  · Noted speech eval with chronic aspiration and patient desire for more normalized diet  Discussed future options with son , started palliative conversation regarding recurrent pneumonias  Thrombocytopenia ([address] )   Assessment & Plan    Assessment/plan:  · Heparin antibody serotonin screen was negative, rapid screen was negative  Continue to monitor platelet count- they have improved, now above 100  · Discontinue argatroban        Personal history of CLL (chronic lymphocytic leukemia)   Assessment & Plan    Assessment/plan:  At baseline  · Continue to monitor blood counts, no change        Glaucoma   Assessment & Plan    Assessment/plan:  · Continue eyedrops at baseline, no change        Dementia   Assessment & Plan    Assessment/plan:  · At baseline continue Namenda, Aricept  No change              VTE Pharmacologic Prophylaxis:   Pharmacologic: Pharmacologic VTE Prophylaxis contraindicated due to Off are gastric band had significant thrombocytopenia with heparin so will maintain on SCDs  Mechanical: Mechanical VTE prophylaxis in place  Patient Centered Rounds: I have performed bedside rounds with nursing staff today  Discussions with Specialists or Other Care Team Provider:  None  Education and Discussions with Family / Patient:  Updated son  Time Spent for Care: 30 minutes  More than 50% of total time spent on counseling and coordination of care as described above      Current Length of Stay: 10 day(s)  Current Patient Status: Inpatient   Certification Statement: The patient will continue to require additional inpatient hospital stay due to Completing antibiotic therapy and for rehab placement    Discharge Plan:  Monday  to [facility]  Code Status: Level 3 - DNAR and DNI    Subjective:   Patient is pleasantly confused  He is however able to feed himself this evening and had no other complaints other than not understanding why he is where he is  Objective:   Vitals:   Temp (24hrs), Av 9 °F (36 6 °C), Min:97 8 °F (36 6 °C), Max:97 9 °F (36 6 °C)    HR:  [64-68] 68  Resp:  [20] 20  BP: (141-149)/(63-70) 141/63  SpO2:  [95 %-98 %] 98 %  Body mass index is 22 85 kg/m²  Input and Output Summary (last 24 hours):         Physical Exam:     Physical Exam exam remains pupils equal round and reactive to light extraocular muscles intact mucous membranes are moist neck is supple there is no JVD no lymph nodes no carotid bruits chest is decreased but clear to auscultation is no rhonchi rales or wheezes today  Cardiovascular regular rate rhythm positive S1 and S2  Abdomen soft nontender nondistended with positive bowel sounds no hepatosplenomegaly no guarding or rebound  Extremities no clubbing cyanosis edema  Neurologically the patient is oriented to himself but disoriented to time place and events    Additional Data:   Labs:    Results from last 7 days  Lab Units 10/28/17  0345   WBC Thousand/uL 4 43   HEMOGLOBIN g/dL 10 4*   HEMATOCRIT % 31 6*   PLATELETS Thousands/uL 156       Results from last 7 days  Lab Units 10/27/17  0258   SODIUM mmol/L 142   POTASSIUM mmol/L 4 1   CHLORIDE mmol/L 107   CO2 mmol/L 28   BUN mg/dL 20   CREATININE mg/dL 0 96   CALCIUM mg/dL 8 9   GLUCOSE RANDOM mg/dL 119           * I Have Reviewed All Lab Data Listed Above    * Additional Pertinent Lab Tests Reviewed: No New Labs Available For Today    Imaging:    Imaging Reports Reviewed Today Include:  None    Cultures:   Blood Culture:   Lab Results   Component Value Date    BLOODCX No Growth After 5 Days  10/19/2017    BLOODCX No Growth After 5 Days  10/19/2017    BLOODCX No Growth After 5 Days  02/06/2017    BLOODCX No Growth After 5 Days  02/06/2017    BLOODCX No Growth After 5 Days  02/02/2017    BLOODCX No Growth After 5 Days   02/02/2017     Urine Culture: No results found for: URINECX  Sputum Culture: No components found for: SPUTUMCX  Wound Culture: No results found for: WOUNDCULT    Last 24 Hours Medication List:     aspirin 81 mg Oral Daily   bimatoprost 1 drop Both Eyes HS   cholecalciferol 2,000 Units Oral Daily   cyanocobalamin 250 mcg Oral Daily   donepezil 10 mg Oral HS   dorzolamide 1 drop Both Eyes BID   guaiFENesin 600 mg Oral Q12H SONY   ipratropium 0 5 mg Nebulization TID   levalbuterol 1 25 mg Nebulization TID   memantine 10 mg Oral BID        Today, Patient Was Seen By: Dawn Mayes MD

## 2017-10-31 ENCOUNTER — GENERIC CONVERSION - ENCOUNTER (OUTPATIENT)
Dept: OTHER | Facility: OTHER | Age: 82
End: 2017-10-31

## 2018-01-09 NOTE — MISCELLANEOUS
Message  Records indicate a history of Lyme disease which was treated   Suspect old infection as low risk for more recent infection      Plan  Memory difficulty    · EEG Awake and Drowsy - POC; Status:Active - Perform Order; Requested  for:[id number];     Signatures   Electronically signed by : Nolvia Baron MD; Aug 16 2016  4:57PM EST                       (Author)

## 2018-01-16 NOTE — MISCELLANEOUS
History of Present Illness  [facility] Communication [facility]: ELIANA [facility] records were reviewed  He was hospitalized at [facility]  The date of admission: 10/19/17, date of discharge: 10/30/17  Diagnosis: SOB, weakness/pneumonia  He was discharged to a rehabilitation center  He did not schedule a follow up appointment  Communication performed and completed by      Active Problems    1  Actinic keratoses (702 0) (L57 0)   2  Alzheimer's dementia (331 0) (G30 9)   3  Ataxia (781 3) (R27 0)   4  Auditory hallucination (780 1) (R44 0)   5  Chronic lymphocytic leukemia (204 10) (C91 10)   6  Cutaneous horn (702 8) (L85 8)   7  Dermatitis (692 9) (L30 9)   8  Edema (782 3) (R60 9)   9  Glaucoma (365 9) (H40 9)   10  Hyperlipidemia (272 4) (E78 5)   11  Lung nodule seen on imaging study (793 11) (R91 1)   12  Memory difficulty (780 93) (R41 3)   13  Muscle weakness (generalized) (728 87) (M62 81)   14  Need for revaccination (V05 9) (Z23)   15  Neuropathy, peripheral (356 9) (G62 9)   16  Physical deconditioning (799 3) (R53 81)   17  Screening for skin condition (V82 0) (Z13 89)   18  Seborrheic keratosis (702 19) (L82 1)   19  Skin lesion (709 9) (L98 9)   20  Stage 1 decubitus ulcer (707 00,707 21) (L89 91)   21  Tinea cruris (110 3) (B35 6)   22  Vitamin B12 deficiency (266 2) (E53 8)   23  Weight loss (783 21) (R63 4)    Past Medical History    1  History of Abnormal auditory perception (388 40) (H93 299)   2  History of Bite From A Nonvenomous Arthropod (E906 4)   3  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)   4  H/O nonmelanoma skin cancer (V10 83) (Z85 828)   5  History of benign neoplasm of skin (V13 3) (Z87 2)   6  History of chest pain (V13 89) (Z87 898)   7  History of fatigue (V13 89) (Z87 898)   8  History of hypercholesterolemia (V12 29) (Z86 39)   9  History of insomnia (V13 89) (Z87 898)   10  History of neoplasm of uncertain behavior of skin (V13 3) (Z86 03)   11   History of polymyalgia rheumatica (V13 59) (Z87 39)   12  History of transient cerebral ischemia (V12 54) (Z86 73)   13  History of Impacted cerumen, unspecified laterality   14  History of Inflamed seborrheic keratosis (702 11) (L82 0)   15  History of Lyme disease (088 81) (A69 20)   16  History of Malaise (780 79) (R53 81)   17  History of Malignant Neoplasm Of The Prostate Gland (V10 46)   18  History of Malignant Neoplasm Of The Prostate Gland (V10 46)   19  History of Multiple joint pain (719 49) (M25 50)   20  History of Muscle weakness (728 87) (M62 81)   21  History of Myalgia And Myositis (729 1)   22  History of Nonvenomous Insect Bite Of Shoulder (912 4)   23  History of Prostate disorder (602 9) (N42 9)   24  History of Skin rash (782 1) (R21)   25  History of Symptoms Referable To A Joint (719 60)   26  History of Xerosis cutis (706 8) (L85 3)    Surgical History    1  History of Cataract Surgery   2  History of Complete Colonoscopy   3  History of Sigmoidoscopy (Fiberoptic, Therapeutic )    Family History  Mother    1  Family history of    2  Family history of Old age  Father    1  Family history of    4  Family history of Old age  Daughter    11  No pertinent family history  Son    6  No pertinent family history  Sibling    7  No pertinent family history    Social History    · Minimum alcohol consumption   · Never A Smoker   · No alcohol use   · Retired   ·     Current Meds   1  APAP 325 MG Oral Tablet; Therapy: (Recorded:60Wzy4022) to Recorded   2  APAP 500 MG CAPS; TAKE 2 CAPSULE Every 8 hours PRN; Therapy: (Recorded:15Sku2223) to Recorded   3  Aspirin 81 MG TABS; TAKE 1 TABLET DAILY; Therapy: (Recorded:74Pct0865) to Recorded   4  Aspirin Low Dose 81 MG Oral Tablet Chewable; Therapy: (Recorded:47Bww7180) to Recorded   5  Calmoseptine 0 44-20 6 % External Ointment; Therapy: (Recorded:01Zds6141) to Recorded   6  Cyanocobalamin 1000 MCG/ML Injection Solution; INJECT 1 ML INTRAMUSCULARLY   ONCE A MONTH;  To Be Done: 12Apr2014; Status: HOLD FOR   - Administration Ordered   7  Cyanocobalamin 1000 MCG/ML Injection Solution; INJECT 1 ML INTRAMUSCULARLY   ONCE A MONTH; To Be Done: 27TVA6306; Status: HOLD FOR   - Administration Ordered   8  Daily Vites Oral Tablet; Therapy: (Recorded:86Dhx5785) to Recorded   9  Donepezil HCl - 10 MG Oral Tablet; TAKE 1 TABLET AT BEDTIME; Last Rx:22Sep2016   Ordered   10  Dorzolamide HCl - 2 % Ophthalmic Solution; INSTILL 1 DROP INTO BOTH EYES 2    TIMES DAILY; Therapy: (Recorded:52Dtd3389) to Recorded   11  Furosemide 40 MG Oral Tablet; take 1 tablet by mouth once daily; Therapy: 08QIX1331 to (Tc Saldivar)  Requested for: 54YGR2340; Last    Rx:25Jan2016 Ordered   12  Latanoprost 0 005 % Ophthalmic Solution; INSTILL 1 DROP IN BOTH EYES AT    BEDTIME; Therapy: (Recorded:13Jan2014) to Recorded   13  Loperamide HCl - 2 MG Oral Capsule; TAKE 1 CAPSULE Daily PRN; Therapy: ([place]) to Recorded   14  Lumigan 0 01 % Ophthalmic Solution; Therapy: (Recorded:77Svt4125) to Recorded   15  Melatonin 5 MG Oral Tablet; Therapy: (Recorded:78Trp2178) to Recorded   16  Milk of Magnesia 400 MG/5ML Oral Suspension; Therapy: (Recorded:87Wkv1882) to Recorded   17  Multivitamins Oral Capsule; TAKE 1 CAPSULE DAILY; Therapy: (Recorded:13Jan2014) to Recorded   18  Namenda XR 28 MG Oral Capsule Extended Release 24 Hour; take 1 capsule daily; Therapy: 04WRG8766 to (Last OS:10XEV2092)  Requested for: 56XEI7952 Ordered   19  Nystatin 073219 UNIT/GM External Powder; APPLY SPARINGLY TO AFFECTED AREA(S)    TWICE DAILY; Therapy: (Recorded:03Nov2016) to Recorded   20  Potassium Chloride Delicia ER 10 MEQ Oral Tablet Extended Release; take 1 tablet by    mouth twice a day; Therapy: 77WVB9563 to (Evaluate:62Yzj3563)  Requested for: 64CPE2076; Last    Rx:18May2015 Ordered   21  PreserVision/Lutein Oral Capsule; TAKE 1 CAPSULE DAILY; Therapy: (Recorded:13Jan2014) to Recorded   22  Vitamin B-12 1000 MCG Oral Tablet; TAKE 1 TABLET DAILY AS DIRECTED; Therapy: (Recorded:35Fkw5576) to Recorded   23  Vitamin D3 1000 UNIT Oral Capsule; Therapy: (Recorded:35Iyp9090) to Recorded    Allergies    1  No Known Drug Allergies    Future Appointments    Date/Time Provider Specialty Site   07/20/2018 10:45 AM CAROLE Zaragoza   Dermatology [facility]     Signatures   Electronically signed by : Frank Hendrix, ; Oct 31 2017 10:25AM EST                       (Author)    Electronically signed by : Elieser Vaca DO; Oct 31 2017 12:40PM EST                       (Co-author)

## 2018-01-18 NOTE — PROCEDURES
Results/Data  Procedure: Electroencephalography (EEG)   Indications for the procedure include Memory Difficulties  Were discussed with the patient  Written consent was obtained prior to the procedure and is detailed in the patient's record  Prior to the start of the procedure, a time out was taken and the identity of the patient was confirmed via name and date of birth with the patient  The correct site(s) and the procedure to be performed were confirmed and the site(s) were marked appropriately  The positioning of the patient and the availabilty of the correct equipment were verified  Certain medications (such as anticonvulsants and tranquilizers), stimulants, and alcohol were avoided for at least 24-48 hours prior to the procedure  Procedure Note:   Performed by: Arnell Libman  Start Time: 3:30   End Time: 4:00   Electrode(s) Placement: Fp1, Fp2, F7, F3, Fz, F4, F8, T3, C3, CZ, C4, T4, T5, T6, P3, PZ, P4, O1, O2, A1 and A2  They were placed in a bipolar montage, referential montage, average reference montage, laplacian montage  The EEG was performed while the patient was exposed to of photic stimulation and awake and drowsy, but not hyperventilated and not sedated  Findings: EEG    This is a routine 18 channel EEG recording performed on a 80year-old man  with a history of memory difficulty   Background activities during wakefulness consist of mid amplitude 7-7 5 cycle per second activities emanating from the posterior head region  These activities are reactive to eye opening  Intermingled with background activities are a moderate amount of lower amplitude beta activities emanating from the frontocentral head regions   Episodes of drowsiness and early sleep are manifest by attenuation of background activities and central V waves    The main feature of the recording in addition a slowing of background activities consists of mid amplitude 2-4 cps activities emanating from the frontotemporal head regions bilaterally with spread in the central parietal regions occurring independently as well as by synchronously without hemispheric predominance    Photic stimulation was performed over a wide range of flash frequencies and produced little in the way of a driving response  Hyperventilation was not obtained    Concomitant EKG revealed a sinus rhythm  IMPRESSION: This is an abnormal study due to slowing of background activities in the theta frequencies with delta activities emanating from the bi-sylvian region  These types about her maladies are consistent with diffuse cortical and subcortical dysfunction as seen in toxic, metabolic and/or neurodegenerative processes    CAROLE Shane  Impression:    Post-Procedure:   the patient tolerated the procedure well  Complications: There were no complications        Signatures   Electronically signed by : Peyton Mcleod MD; Aug 30 2016  5:21PM EST                       (Author)

## 2018-01-22 VITALS — TEMPERATURE: 97.5 F

## 2018-03-07 NOTE — PROGRESS NOTES
History of Present Illness    Revaccination   Vaccine Information: Vaccine(s) Given (names): Sridhar Wright [id number]  Spoke with patient regarding vaccine out of temperature range  Action(s): Pt will be revaccinated  Appointment scheduled: [phone] [id number] sd  Pt contacted and will call back  Other Information: spoke with caretaker and patient will return call [phone] sd  patient returned call and scheduled apt  [phone] sd  Revaccination Completed: [id number]  Active Problems    1  Actinic keratoses (702 0) (L57 0)   2  Alzheimer's dementia (331 0) (G30 9)   3  Ataxia (781 3) (R27 0)   4  Auditory hallucination (780 1) (R44 0)   5  Chronic lymphocytic leukemia (204 10) (C91 10)   6  Cutaneous horn (702 8) (L85 8)   7  Dermatitis (692 9) (L30 9)   8  Edema (782 3) (R60 9)   9  Glaucoma (365 9) (H40 9)   10  Hyperlipidemia (272 4) (E78 5)   11  Lung nodule seen on imaging study (793 11) (R91 1)   12  Memory difficulty (780 93) (R41 3)   13  Muscle weakness (generalized) (728 87) (M62 81)   14  Need for revaccination (V05 9) (Z23)   15  Neuropathy, peripheral (356 9) (G62 9)   16  Physical deconditioning (799 3) (R53 81)   17  Screening for skin condition (V82 0) (Z13 89)   18  Seborrheic keratosis (702 19) (L82 1)   19  Skin lesion (709 9) (L98 9)   20  Stage 1 decubitus ulcer (707 00,707 21) (L89 91)   21  Tinea cruris (110 3) (B35 6)   22  Vitamin B12 deficiency (266 2) (E53 8)   23  Weight loss (783 21) (R63 4)    Immunizations  Influenza --- Irving Cinnamon: 2014; Series2: Nov 2015   PCV --- Series1: 21-Dec-2015   PPSV --- Irving Cinnamon: Never   Tdap --- Irving Cinnamon: Unknown   Tetanus --- Irving Cinnamon: Unknown   Zoster --- Series1: Never     Current Meds   1  APAP 500 MG CAPS; TAKE 2 CAPSULE Every 8 hours PRN   2  Aspirin 81 MG TABS; TAKE 1 TABLET DAILY   3  Cyanocobalamin 1000 MCG/ML Injection Solution; INJECT 1 ML INTRAMUSCULARLY   ONCE A MONTH   4   Cyanocobalamin 1000 MCG/ML Injection Solution; INJECT 1 ML INTRAMUSCULARLY ONCE A MONTH   5  Donepezil HCl - 10 MG Oral Tablet; TAKE 1 TABLET AT BEDTIME   6  Dorzolamide HCl - 2 % Ophthalmic Solution; INSTILL 1 DROP INTO BOTH EYES 2   TIMES DAILY   7  Furosemide 40 MG Oral Tablet; take 1 tablet by mouth once daily   8  Latanoprost 0 005 % Ophthalmic Solution; INSTILL 1 DROP IN BOTH EYES AT BEDTIME   9  Loperamide HCl - 2 MG Oral Capsule; TAKE 1 CAPSULE Daily PRN   10  Multivitamins Oral Capsule; TAKE 1 CAPSULE DAILY   11  Nystatin 885457 UNIT/GM External Powder; APPLY SPARINGLY TO AFFECTED AREA(S)    TWICE DAILY   12  Potassium Chloride Delicia ER 10 MEQ Oral Tablet Extended Release; take 1 tablet by    mouth twice a day   13  PreserVision/Lutein Oral Capsule; TAKE 1 CAPSULE DAILY   14  Terbinafine HCl - 250 MG Oral Tablet; TAKE 1 TABLET DAILY   15  Vitamin B-12 1000 MCG Oral Tablet; TAKE 1 TABLET DAILY AS DIRECTED    Allergies    1  No Known Drug Allergies    Future Appointments    Date/Time Provider Specialty Site   03/30/2017 01:40 PM Triny Judd MD Neurology [facility]   07/18/2017 10:45 AM CAROLE Luna   Dermatology [facility]     Signatures   Electronically signed by : Wendi Ramirez DO; Jan 25 2017  3:09PM EST                       (Author)

## 2018-05-06 ENCOUNTER — HOSPITAL ENCOUNTER (EMERGENCY)
Facility: HOSPITAL | Age: 83
Discharge: HOME | DRG: 871 | End: 2018-05-06
Attending: EMERGENCY MEDICINE | Admitting: EMERGENCY MEDICINE
Payer: MEDICARE

## 2018-05-06 ENCOUNTER — APPOINTMENT (EMERGENCY)
Dept: RADIOLOGY | Facility: HOSPITAL | Age: 83
DRG: 871 | End: 2018-05-06
Payer: MEDICARE

## 2018-05-06 VITALS
HEIGHT: 72 IN | DIASTOLIC BLOOD PRESSURE: 64 MMHG | HEART RATE: 78 BPM | OXYGEN SATURATION: 96 % | SYSTOLIC BLOOD PRESSURE: 129 MMHG | BODY MASS INDEX: 24.43 KG/M2 | RESPIRATION RATE: 22 BRPM | WEIGHT: 180.34 LBS | TEMPERATURE: 97.9 F

## 2018-05-06 DIAGNOSIS — J18.9 PNEUMONIA: Primary | ICD-10-CM

## 2018-05-06 LAB
ALBUMIN SERPL BCP-MCNC: 2.8 G/DL (ref 3.5–5)
ALP SERPL-CCNC: 77 U/L (ref 46–116)
ALT SERPL W P-5'-P-CCNC: 13 U/L (ref 12–78)
ANION GAP SERPL CALCULATED.3IONS-SCNC: 7 MMOL/L (ref 4–13)
APTT PPP: 42 SECONDS (ref 23–35)
AST SERPL W P-5'-P-CCNC: 17 U/L (ref 5–45)
ATRIAL RATE: 72 BPM
BACTERIA UR QL AUTO: ABNORMAL /HPF
BASOPHILS # BLD AUTO: 0.05 THOUSANDS/ΜL (ref 0–0.1)
BASOPHILS NFR BLD AUTO: 1 % (ref 0–1)
BILIRUB SERPL-MCNC: 1 MG/DL (ref 0.2–1)
BILIRUB UR QL STRIP: NEGATIVE
BUN SERPL-MCNC: 19 MG/DL (ref 5–25)
CALCIUM SERPL-MCNC: 8.9 MG/DL (ref 8.3–10.1)
CHLORIDE SERPL-SCNC: 105 MMOL/L (ref 100–108)
CLARITY UR: CLEAR
CO2 SERPL-SCNC: 28 MMOL/L (ref 21–32)
COLOR UR: YELLOW
CREAT SERPL-MCNC: 1.17 MG/DL (ref 0.6–1.3)
EOSINOPHIL # BLD AUTO: 0.12 THOUSAND/ΜL (ref 0–0.61)
EOSINOPHIL NFR BLD AUTO: 2 % (ref 0–6)
ERYTHROCYTE [DISTWIDTH] IN BLOOD BY AUTOMATED COUNT: 13.2 % (ref 11.6–15.1)
GFR SERPL CREATININE-BSD FRML MDRD: 53 ML/MIN/1.73SQ M
GLUCOSE SERPL-MCNC: 113 MG/DL (ref 65–140)
GLUCOSE UR STRIP-MCNC: NEGATIVE MG/DL
HCT VFR BLD AUTO: 32 % (ref 36.5–49.3)
HGB BLD-MCNC: 10.4 G/DL (ref 12–17)
HGB UR QL STRIP.AUTO: NEGATIVE
INR PPP: 1.09 (ref 0.86–1.16)
KETONES UR STRIP-MCNC: ABNORMAL MG/DL
LACTATE SERPL-SCNC: 0.9 MMOL/L (ref 0.5–2)
LEUKOCYTE ESTERASE UR QL STRIP: NEGATIVE
LYMPHOCYTES # BLD AUTO: 3.88 THOUSANDS/ΜL (ref 0.6–4.47)
LYMPHOCYTES NFR BLD AUTO: 58 % (ref 14–44)
MCH RBC QN AUTO: 32.3 PG (ref 26.8–34.3)
MCHC RBC AUTO-ENTMCNC: 32.5 G/DL (ref 31.4–37.4)
MCV RBC AUTO: 99 FL (ref 82–98)
MONOCYTES # BLD AUTO: 0.52 THOUSAND/ΜL (ref 0.17–1.22)
MONOCYTES NFR BLD AUTO: 8 % (ref 4–12)
MUCOUS THREADS UR QL AUTO: ABNORMAL
NEUTROPHILS # BLD AUTO: 2.16 THOUSANDS/ΜL (ref 1.85–7.62)
NEUTS SEG NFR BLD AUTO: 32 % (ref 43–75)
NITRITE UR QL STRIP: NEGATIVE
NON-SQ EPI CELLS URNS QL MICRO: ABNORMAL /HPF
NRBC BLD AUTO-RTO: 0 /100 WBCS
P AXIS: 64 DEGREES
PH UR STRIP.AUTO: 6 [PH] (ref 4.5–8)
PLATELET # BLD AUTO: 113 THOUSANDS/UL (ref 149–390)
PMV BLD AUTO: 9.8 FL (ref 8.9–12.7)
POTASSIUM SERPL-SCNC: 4.2 MMOL/L (ref 3.5–5.3)
PR INTERVAL: 218 MS
PROT SERPL-MCNC: 5.5 G/DL (ref 6.4–8.2)
PROT UR STRIP-MCNC: ABNORMAL MG/DL
PROTHROMBIN TIME: 14.3 SECONDS (ref 12.1–14.4)
QRS AXIS: 118 DEGREES
QRSD INTERVAL: 76 MS
QT INTERVAL: 396 MS
QTC INTERVAL: 433 MS
RBC # BLD AUTO: 3.22 MILLION/UL (ref 3.88–5.62)
RBC #/AREA URNS AUTO: ABNORMAL /HPF
SODIUM SERPL-SCNC: 140 MMOL/L (ref 136–145)
SP GR UR STRIP.AUTO: 1.02 (ref 1–1.03)
T WAVE AXIS: 39 DEGREES
UROBILINOGEN UR QL STRIP.AUTO: 0.2 E.U./DL
VENTRICULAR RATE: 72 BPM
WBC # BLD AUTO: 6.75 THOUSAND/UL (ref 4.31–10.16)
WBC #/AREA URNS AUTO: ABNORMAL /HPF

## 2018-05-06 PROCEDURE — 85730 THROMBOPLASTIN TIME PARTIAL: CPT | Performed by: EMERGENCY MEDICINE

## 2018-05-06 PROCEDURE — 80053 COMPREHEN METABOLIC PANEL: CPT | Performed by: EMERGENCY MEDICINE

## 2018-05-06 PROCEDURE — 99285 EMERGENCY DEPT VISIT HI MDM: CPT

## 2018-05-06 PROCEDURE — 36415 COLL VENOUS BLD VENIPUNCTURE: CPT | Performed by: EMERGENCY MEDICINE

## 2018-05-06 PROCEDURE — 85610 PROTHROMBIN TIME: CPT | Performed by: EMERGENCY MEDICINE

## 2018-05-06 PROCEDURE — 81001 URINALYSIS AUTO W/SCOPE: CPT | Performed by: EMERGENCY MEDICINE

## 2018-05-06 PROCEDURE — 83605 ASSAY OF LACTIC ACID: CPT | Performed by: EMERGENCY MEDICINE

## 2018-05-06 PROCEDURE — 93005 ELECTROCARDIOGRAM TRACING: CPT

## 2018-05-06 PROCEDURE — 85025 COMPLETE CBC W/AUTO DIFF WBC: CPT | Performed by: EMERGENCY MEDICINE

## 2018-05-06 PROCEDURE — 93010 ELECTROCARDIOGRAM REPORT: CPT | Performed by: INTERNAL MEDICINE

## 2018-05-06 PROCEDURE — 71045 X-RAY EXAM CHEST 1 VIEW: CPT

## 2018-05-06 RX ORDER — FERROUS SULFATE 325(65) MG
325 TABLET ORAL 2 TIMES DAILY WITH MEALS
COMMUNITY

## 2018-05-06 RX ORDER — CEFDINIR 250 MG/5ML
300 POWDER, FOR SUSPENSION ORAL 2 TIMES DAILY
Qty: 60 ML | Refills: 0 | Status: SHIPPED | OUTPATIENT
Start: 2018-05-06 | End: 2018-05-16

## 2018-05-06 RX ORDER — CEFDINIR 250 MG/5ML
300 POWDER, FOR SUSPENSION ORAL ONCE
Status: COMPLETED | OUTPATIENT
Start: 2018-05-06 | End: 2018-05-06

## 2018-05-06 RX ORDER — ALBUTEROL SULFATE 2.5 MG/3ML
2.5 SOLUTION RESPIRATORY (INHALATION) EVERY 6 HOURS PRN
Qty: 75 ML | Refills: 0 | Status: SHIPPED | OUTPATIENT
Start: 2018-05-06 | End: 2019-05-06

## 2018-05-06 RX ADMIN — CEFDINIR 300 MG: 250 POWDER, FOR SUSPENSION ORAL at 07:35

## 2018-05-06 NOTE — ED NOTES
Pt's brief changed, pt placed back into personal clothes, and given pj bottoms  Pt resting in stretcher comfortably at this time, awaiting transportation       Osmany Newell RN  05/06/18 8061

## 2018-05-06 NOTE — DISCHARGE INSTRUCTIONS
Community Acquired Pneumonia   WHAT YOU NEED TO KNOW:   Community-acquired pneumonia (CAP) is a lung infection that you get outside of a hospital or nursing home setting  Your lungs become inflamed and cannot work well  CAP may be caused by bacteria, viruses, or fungi  DISCHARGE INSTRUCTIONS:   Return to the emergency department if:   · You are confused and cannot think clearly  · You have increased trouble breathing  · Your lips or fingernails turn gray or blue  Contact your healthcare provider if:   · Your symptoms do not get better, or they get worse  · You are urinating less, or not at all  · You have questions or concerns about your condition or care  Medicines:   · Medicines  may be given to treat a bacterial, viral, or fungal infection  You may also be given medicines to dilate your bronchial tubes to help you breathe more easily  · Take your medicine as directed  Contact your healthcare provider if you think your medicine is not helping or if you have side effects  Tell him or her if you are allergic to any medicine  Keep a list of the medicines, vitamins, and herbs you take  Include the amounts, and when and why you take them  Bring the list or the pill bottles to follow-up visits  Carry your medicine list with you in case of an emergency  Follow up with your healthcare provider within 3 days or as directed: You may need another x-ray  Write down your questions so you remember to ask them during your visits  Deep breathing and coughing:  Deep breathing helps open the air passages in your lungs  Coughing helps bring up mucus from your lungs  Take a deep breath and hold the breath as long as you can  Then push the air out of your lungs with a deep, strong cough  Spit out any mucus you have coughed up  Take 10 deep breaths in a row every hour that you are awake  Remember to follow each deep breath with a cough     Do not smoke or allow others to smoke around you:  Nicotine and other chemicals in cigarettes and cigars can cause lung damage  Ask your healthcare provider for information if you currently smoke and need help to quit  E-cigarettes or smokeless tobacco still contain nicotine  Talk to your healthcare provider before you use these products  Manage CAP at home:   · Breathe warm, moist air  This helps loosen mucus  Loosely place a warm, wet washcloth over your nose and mouth  A room humidifier may also help make the air moist     · Drink liquids as directed  Ask your healthcare provider how much liquid to drink each day and which liquids to drink  Liquids help make mucus thin and easier to get out of your body  · Gently tap your chest   This helps loosen mucus so it is easier to cough  Lie with your head lower than your chest several times a day and tap your chest      · Get plenty of rest   Rest helps your body heal   Prevent CAP:   · Wash your hands often with soap and water  Carry germ-killing hand gel with you  You can use the gel to clean your hands when soap and water are not available  Do not touch your eyes, nose, or mouth unless you have washed your hands first      · Clean surfaces often  Clean doorknobs, countertops, cell phones, and other surfaces that are touched often  · Always cover your mouth when you cough  Cough into a tissue or your shirtsleeve so you do not spread germs from your hands  · Try to avoid people who have a cold or the flu  If you are sick, stay away from others as much as possible  · Ask about vaccines  You may need a vaccine to help prevent pneumonia  Get an influenza (flu) vaccine every year as soon as it becomes available  © 2017 [address] [employer] Information is for End User's use only and may not be sold, redistributed or otherwise used for commercial purposes  All illustrations and images included in CareNotes® are the copyrighted property of [employer]  or Erik Mora    The above information is an  only  It is not intended as medical advice for individual conditions or treatments  Talk to your doctor, nurse or pharmacist before following any medical regimen to see if it is safe and effective for you

## 2018-05-09 ENCOUNTER — HOSPITAL ENCOUNTER (INPATIENT)
Facility: HOSPITAL | Age: 83
LOS: 5 days | Discharge: HOSPICE | DRG: 871 | End: 2018-05-14
Attending: INTERNAL MEDICINE | Admitting: INTERNAL MEDICINE
Payer: MEDICARE

## 2018-05-09 ENCOUNTER — APPOINTMENT (EMERGENCY)
Dept: RADIOLOGY | Facility: HOSPITAL | Age: 83
DRG: 871 | End: 2018-05-09
Payer: MEDICARE

## 2018-05-09 DIAGNOSIS — N39.0 UTI (URINARY TRACT INFECTION): ICD-10-CM

## 2018-05-09 DIAGNOSIS — R09.02 HYPOXIA: ICD-10-CM

## 2018-05-09 DIAGNOSIS — R53.1 GENERALIZED WEAKNESS: ICD-10-CM

## 2018-05-09 DIAGNOSIS — F03.90 DEMENTIA (HCC): ICD-10-CM

## 2018-05-09 DIAGNOSIS — J18.9 PNEUMONIA: ICD-10-CM

## 2018-05-09 DIAGNOSIS — A41.9 SEPSIS (HCC): ICD-10-CM

## 2018-05-09 DIAGNOSIS — J18.9 HEALTHCARE-ASSOCIATED PNEUMONIA: Primary | ICD-10-CM

## 2018-05-09 LAB
ALBUMIN SERPL BCP-MCNC: 2.9 G/DL (ref 3.5–5)
ALP SERPL-CCNC: 85 U/L (ref 46–116)
ALT SERPL W P-5'-P-CCNC: 24 U/L (ref 12–78)
ANION GAP SERPL CALCULATED.3IONS-SCNC: 10 MMOL/L (ref 4–13)
APTT PPP: 52 SECONDS (ref 23–35)
AST SERPL W P-5'-P-CCNC: 40 U/L (ref 5–45)
ATRIAL RATE: 87 BPM
BACTERIA UR QL AUTO: ABNORMAL /HPF
BASOPHILS # BLD AUTO: 0.03 THOUSANDS/ΜL (ref 0–0.1)
BASOPHILS NFR BLD AUTO: 1 % (ref 0–1)
BILIRUB SERPL-MCNC: 0.6 MG/DL (ref 0.2–1)
BILIRUB UR QL STRIP: NEGATIVE
BUN SERPL-MCNC: 21 MG/DL (ref 5–25)
CALCIUM SERPL-MCNC: 9.8 MG/DL (ref 8.3–10.1)
CHLORIDE SERPL-SCNC: 107 MMOL/L (ref 100–108)
CLARITY UR: ABNORMAL
CO2 SERPL-SCNC: 25 MMOL/L (ref 21–32)
COLOR UR: ABNORMAL
CREAT SERPL-MCNC: 1.24 MG/DL (ref 0.6–1.3)
EOSINOPHIL # BLD AUTO: 0.02 THOUSAND/ΜL (ref 0–0.61)
EOSINOPHIL NFR BLD AUTO: 0 % (ref 0–6)
ERYTHROCYTE [DISTWIDTH] IN BLOOD BY AUTOMATED COUNT: 13.2 % (ref 11.6–15.1)
GFR SERPL CREATININE-BSD FRML MDRD: 49 ML/MIN/1.73SQ M
GLUCOSE SERPL-MCNC: 174 MG/DL (ref 65–140)
GLUCOSE UR STRIP-MCNC: NEGATIVE MG/DL
HCT VFR BLD AUTO: 33.9 % (ref 36.5–49.3)
HGB BLD-MCNC: 11.3 G/DL (ref 12–17)
HGB UR QL STRIP.AUTO: ABNORMAL
INR PPP: 1.13 (ref 0.86–1.16)
KETONES UR STRIP-MCNC: ABNORMAL MG/DL
L PNEUMO1 AG UR QL IA.RAPID: NEGATIVE
LACTATE SERPL-SCNC: 1.4 MMOL/L (ref 0.5–2)
LEUKOCYTE ESTERASE UR QL STRIP: ABNORMAL
LYMPHOCYTES # BLD AUTO: 2.15 THOUSANDS/ΜL (ref 0.6–4.47)
LYMPHOCYTES NFR BLD AUTO: 44 % (ref 14–44)
MCH RBC QN AUTO: 32.7 PG (ref 26.8–34.3)
MCHC RBC AUTO-ENTMCNC: 33.3 G/DL (ref 31.4–37.4)
MCV RBC AUTO: 98 FL (ref 82–98)
MONOCYTES # BLD AUTO: 0.24 THOUSAND/ΜL (ref 0.17–1.22)
MONOCYTES NFR BLD AUTO: 5 % (ref 4–12)
NEUTROPHILS # BLD AUTO: 2.44 THOUSANDS/ΜL (ref 1.85–7.62)
NEUTS SEG NFR BLD AUTO: 50 % (ref 43–75)
NITRITE UR QL STRIP: POSITIVE
NON-SQ EPI CELLS URNS QL MICRO: ABNORMAL /HPF
NRBC BLD AUTO-RTO: 0 /100 WBCS
P AXIS: -6 DEGREES
PH UR STRIP.AUTO: 5 [PH] (ref 4.5–8)
PLATELET # BLD AUTO: 131 THOUSANDS/UL (ref 149–390)
PMV BLD AUTO: 9.7 FL (ref 8.9–12.7)
POTASSIUM SERPL-SCNC: 3.8 MMOL/L (ref 3.5–5.3)
PR INTERVAL: 194 MS
PROT SERPL-MCNC: 6 G/DL (ref 6.4–8.2)
PROT UR STRIP-MCNC: ABNORMAL MG/DL
PROTHROMBIN TIME: 14.8 SECONDS (ref 12.1–14.4)
QRS AXIS: 93 DEGREES
QRSD INTERVAL: 80 MS
QT INTERVAL: 428 MS
QTC INTERVAL: 515 MS
RBC # BLD AUTO: 3.46 MILLION/UL (ref 3.88–5.62)
RBC #/AREA URNS AUTO: ABNORMAL /HPF
S PNEUM AG UR QL: NEGATIVE
SODIUM SERPL-SCNC: 142 MMOL/L (ref 136–145)
SP GR UR STRIP.AUTO: >=1.03 (ref 1–1.03)
T WAVE AXIS: 95 DEGREES
TROPONIN I SERPL-MCNC: <0.02 NG/ML
UROBILINOGEN UR QL STRIP.AUTO: 1 E.U./DL
VENTRICULAR RATE: 87 BPM
WBC # BLD AUTO: 4.9 THOUSAND/UL (ref 4.31–10.16)
WBC #/AREA URNS AUTO: ABNORMAL /HPF

## 2018-05-09 PROCEDURE — 85610 PROTHROMBIN TIME: CPT | Performed by: PHYSICIAN ASSISTANT

## 2018-05-09 PROCEDURE — 80053 COMPREHEN METABOLIC PANEL: CPT | Performed by: PHYSICIAN ASSISTANT

## 2018-05-09 PROCEDURE — 96360 HYDRATION IV INFUSION INIT: CPT

## 2018-05-09 PROCEDURE — 84484 ASSAY OF TROPONIN QUANT: CPT | Performed by: PHYSICIAN ASSISTANT

## 2018-05-09 PROCEDURE — 87040 BLOOD CULTURE FOR BACTERIA: CPT | Performed by: PHYSICIAN ASSISTANT

## 2018-05-09 PROCEDURE — 99285 EMERGENCY DEPT VISIT HI MDM: CPT

## 2018-05-09 PROCEDURE — 99222 1ST HOSP IP/OBS MODERATE 55: CPT | Performed by: INTERNAL MEDICINE

## 2018-05-09 PROCEDURE — 87449 NOS EACH ORGANISM AG IA: CPT | Performed by: PHYSICIAN ASSISTANT

## 2018-05-09 PROCEDURE — 85730 THROMBOPLASTIN TIME PARTIAL: CPT | Performed by: PHYSICIAN ASSISTANT

## 2018-05-09 PROCEDURE — 81001 URINALYSIS AUTO W/SCOPE: CPT | Performed by: PHYSICIAN ASSISTANT

## 2018-05-09 PROCEDURE — 85025 COMPLETE CBC W/AUTO DIFF WBC: CPT | Performed by: PHYSICIAN ASSISTANT

## 2018-05-09 PROCEDURE — 93010 ELECTROCARDIOGRAM REPORT: CPT | Performed by: INTERNAL MEDICINE

## 2018-05-09 PROCEDURE — 83605 ASSAY OF LACTIC ACID: CPT | Performed by: PHYSICIAN ASSISTANT

## 2018-05-09 PROCEDURE — 87631 RESP VIRUS 3-5 TARGETS: CPT | Performed by: PHYSICIAN ASSISTANT

## 2018-05-09 PROCEDURE — 93005 ELECTROCARDIOGRAM TRACING: CPT

## 2018-05-09 PROCEDURE — 96365 THER/PROPH/DIAG IV INF INIT: CPT

## 2018-05-09 PROCEDURE — 87449 NOS EACH ORGANISM AG IA: CPT | Performed by: INTERNAL MEDICINE

## 2018-05-09 PROCEDURE — 36415 COLL VENOUS BLD VENIPUNCTURE: CPT | Performed by: PHYSICIAN ASSISTANT

## 2018-05-09 PROCEDURE — 71045 X-RAY EXAM CHEST 1 VIEW: CPT

## 2018-05-09 RX ORDER — MELATONIN
2000 DAILY
Status: DISCONTINUED | OUTPATIENT
Start: 2018-05-10 | End: 2018-05-14 | Stop reason: HOSPADM

## 2018-05-09 RX ORDER — DORZOLAMIDE HCL 20 MG/ML
1 SOLUTION/ DROPS OPHTHALMIC 2 TIMES DAILY
Status: DISCONTINUED | OUTPATIENT
Start: 2018-05-09 | End: 2018-05-14 | Stop reason: HOSPADM

## 2018-05-09 RX ORDER — GUAIFENESIN/DEXTROMETHORPHAN 100-10MG/5
10 SYRUP ORAL EVERY 4 HOURS PRN
Status: DISCONTINUED | OUTPATIENT
Start: 2018-05-09 | End: 2018-05-14 | Stop reason: HOSPADM

## 2018-05-09 RX ORDER — VANCOMYCIN HYDROCHLORIDE 1 G/200ML
12.5 INJECTION, SOLUTION INTRAVENOUS EVERY 12 HOURS
Status: DISCONTINUED | OUTPATIENT
Start: 2018-05-09 | End: 2018-05-09

## 2018-05-09 RX ORDER — ASPIRIN 81 MG/1
81 TABLET, CHEWABLE ORAL DAILY
Status: DISCONTINUED | OUTPATIENT
Start: 2018-05-10 | End: 2018-05-14 | Stop reason: HOSPADM

## 2018-05-09 RX ORDER — DONEPEZIL HYDROCHLORIDE 5 MG/1
10 TABLET, FILM COATED ORAL
Status: DISCONTINUED | OUTPATIENT
Start: 2018-05-09 | End: 2018-05-14 | Stop reason: HOSPADM

## 2018-05-09 RX ORDER — GUAIFENESIN 600 MG
600 TABLET, EXTENDED RELEASE 12 HR ORAL EVERY 12 HOURS SCHEDULED
Status: DISCONTINUED | OUTPATIENT
Start: 2018-05-09 | End: 2018-05-14 | Stop reason: HOSPADM

## 2018-05-09 RX ORDER — CHOLECALCIFEROL (VITAMIN D3) 125 MCG
250 CAPSULE ORAL DAILY
Status: DISCONTINUED | OUTPATIENT
Start: 2018-05-10 | End: 2018-05-09 | Stop reason: SDUPTHER

## 2018-05-09 RX ORDER — ACETAMINOPHEN 325 MG/1
650 TABLET ORAL EVERY 6 HOURS PRN
Status: DISCONTINUED | OUTPATIENT
Start: 2018-05-09 | End: 2018-05-14 | Stop reason: HOSPADM

## 2018-05-09 RX ORDER — ALBUTEROL SULFATE 2.5 MG/3ML
2.5 SOLUTION RESPIRATORY (INHALATION) EVERY 6 HOURS PRN
Status: DISCONTINUED | OUTPATIENT
Start: 2018-05-09 | End: 2018-05-12

## 2018-05-09 RX ORDER — CHOLECALCIFEROL (VITAMIN D3) 125 MCG
250 CAPSULE ORAL DAILY
Status: DISCONTINUED | OUTPATIENT
Start: 2018-05-10 | End: 2018-05-14 | Stop reason: HOSPADM

## 2018-05-09 RX ORDER — ONDANSETRON 2 MG/ML
4 INJECTION INTRAMUSCULAR; INTRAVENOUS EVERY 6 HOURS PRN
Status: DISCONTINUED | OUTPATIENT
Start: 2018-05-09 | End: 2018-05-14 | Stop reason: HOSPADM

## 2018-05-09 RX ORDER — MEMANTINE HYDROCHLORIDE 10 MG/1
10 TABLET ORAL DAILY
Status: DISCONTINUED | OUTPATIENT
Start: 2018-05-10 | End: 2018-05-14 | Stop reason: HOSPADM

## 2018-05-09 RX ORDER — IPRATROPIUM BROMIDE AND ALBUTEROL SULFATE 2.5; .5 MG/3ML; MG/3ML
SOLUTION RESPIRATORY (INHALATION)
Status: COMPLETED
Start: 2018-05-09 | End: 2018-05-09

## 2018-05-09 RX ORDER — FERROUS SULFATE 325(65) MG
325 TABLET ORAL 2 TIMES DAILY WITH MEALS
Status: DISCONTINUED | OUTPATIENT
Start: 2018-05-10 | End: 2018-05-14 | Stop reason: HOSPADM

## 2018-05-09 RX ORDER — HEPARIN SODIUM 5000 [USP'U]/ML
5000 INJECTION, SOLUTION INTRAVENOUS; SUBCUTANEOUS EVERY 8 HOURS SCHEDULED
Status: DISCONTINUED | OUTPATIENT
Start: 2018-05-09 | End: 2018-05-14 | Stop reason: HOSPADM

## 2018-05-09 RX ADMIN — DORZOLAMIDE HYDROCHLORIDE 1 DROP: 20 SOLUTION/ DROPS OPHTHALMIC at 20:59

## 2018-05-09 RX ADMIN — BIMATOPROST 1 DROP: 0.1 SOLUTION/ DROPS OPHTHALMIC at 21:04

## 2018-05-09 RX ADMIN — ACETAMINOPHEN 650 MG: 325 TABLET, FILM COATED ORAL at 21:00

## 2018-05-09 RX ADMIN — CEFEPIME HYDROCHLORIDE 2000 MG: 2 INJECTION, SOLUTION INTRAVENOUS at 15:36

## 2018-05-09 RX ADMIN — HEPARIN SODIUM 5000 UNITS: 5000 INJECTION, SOLUTION INTRAVENOUS; SUBCUTANEOUS at 21:04

## 2018-05-09 RX ADMIN — GUAIFENESIN 600 MG: 600 TABLET, EXTENDED RELEASE ORAL at 21:00

## 2018-05-09 RX ADMIN — DONEPEZIL HYDROCHLORIDE 10 MG: 5 TABLET ORAL at 21:03

## 2018-05-09 RX ADMIN — SODIUM CHLORIDE 2454 ML: 0.9 INJECTION, SOLUTION INTRAVENOUS at 15:34

## 2018-05-09 RX ADMIN — VANCOMYCIN HYDROCHLORIDE 1250 MG: 1 INJECTION, POWDER, LYOPHILIZED, FOR SOLUTION INTRAVENOUS at 16:40

## 2018-05-09 NOTE — PROGRESS NOTES
Pt received from ED to 309  Pt unable to answer admission questions  No family present at the bedside  Sacrum and bilateral heels pink but blanchable  Suction set up in room  Will inform primary nurse

## 2018-05-09 NOTE — SEPSIS NOTE
Sepsis Note   Gema Avalos 80 y o  male MRN: [id number]  Unit/Bed#: JAYCE Encounter: [id number]            Initial Sepsis Screening     Row Name 05/09/18 1558                Is the patient's history suggestive of a new or worsening infection? (!)  Yes (Proceed)  -CO        Suspected source of infection pneumonia  -CO        Are two or more of the following signs & symptoms of infection both present and new to the patient? (!)  Yes (Proceed)  -CO        Indicate SIRS criteria Hyperthemia > 38 3C (100 9F); Tachypnea > 20 resp per min  -CO        If the answer is yes to both questions, suspicion of sepsis is present          If severe sepsis is present AND tissue hypoperfusion perists in the hour after fluid resuscitation or lactate > 4, the patient meets criteria for SEPTIC SHOCK          Are any of the following organ dysfunction criteria present within 6 hours of suspected infection and SIRS criteria that are NOT considered to be chronic conditions? No  -CO        Organ dysfunction          Date of presentation of severe sepsis          Time of presentation of severe sepsis          Tissue hypoperfusion persists in the hour after crystalloid fluid administration, evidenced, by either:          Was hypotension present within one hour of the conclusion of crystalloid fluid administration?           Date of presentation of septic shock          Time of presentation of septic shock            User Key  (r) = Recorded By, (t) = Taken By, (c) = Cosigned By    [address] Name Provider Type    CO Radha Blunt, PA-C Physician Assistant               Default Flowsheet Data (last 720 hours)      Sepsis Reassessment     Row Name 05/09/18 5730                   Volume Status and Tissue Perfusion Post Fluid Resuscitation- Must Document ALL of the Following:    Vital Signs Reviewed Yes  -CO        Cardio Normal S1/S2  -CO        Pulmonary (!)  Tachypnea  -CO        Capillary Refill Brisk  -CO        Peripheral Pulses Radial  -CO        Peripheral Pulse +2  -CO        Skin Warm  -CO           *OR*   Intensive Monitoring- Must Document Two * of the Following Four *:    Vital Signs Reviewed          * Central Venous Pressure (CVP or RAP)          * Central Venous Oxygen (SVO2, ScvO2 or Oxygen saturation via central catheter)          * Bedside Cardiovascular US in IVC diameter and % collapse          * Passive Leg Raise OR Crystalloid Challenge            User Key  (r) = Recorded By, (t) = Taken By, (c) = Cosigned By    Initials Name Provider Type    CO Deepa Rick PA-C Physician Assistant

## 2018-05-09 NOTE — H&P
History and Physical - [facility] Internal Medicine    Patient Information: [address] 80 y o  male MRN: [id number]  Unit/Bed#: -01 Encounter: [id number]  Admitting Physician: Petros Shen DO  PCP: Dewayne Dover DO  Date of Admission:  05/09/18    Assessment/Plan:    Hospital Problem List:     Principal Problem:    Healthcare-associated pneumonia  Active Problems:    Sepsis ([address] )      Plan for the Primary Problem(s): # Sepsis, meets SIRS criteria with fever and increased respiratory rate - secondary to healthcare associated pneumonia given permanent residence [address] outpatient therapy with Omnicef  - place on broad-spectrum antibiotics  - monitor fever curve  - follow-up blood culture  - follow-up strep, Legionella and influenza/RSV  - patient already with baseline history of dysphagia secondary to dementia and is already on dysphagia modified diet, will have speech therapy evaluate to see if there needs to be any change in his diet  - continue supportive care  - nl lactic acid    # Respiratory insufficiency secondary to above  Improvement in oxygenation with nasal cannular  Wean off oxygen as patient clinically improves    Plan for Additional Problems:   # Dementia - cont meds    # CKD stg III - stable    Disposition:  Plan of care discussed with his son Dennis Ross (); code status addressed, patient has living will in place he is DNR DNI  VTE Prophylaxis: Heparin  / sequential compression device   Code Status:  DNR DNI  POLST: There is no POLST form on file for this patient (pre-hospital)    Anticipated Length of Stay:  Patient will be admitted on an Inpatient basis with an anticipated length of stay of  more than 2 midnights  Justification for Hospital Stay:  Sepsis    Total Time for Visit, including Counseling / Coordination of Care: 1 hour  Greater than 50% of this total time spent on direct patient counseling and coordination of care      Chief Complaint:   Fever, cough    History of Present Illness:    History from patient is limited secondary to hard of hearing and dementia    Machelle Russo is a 80 y o  male medical history significant for dementia, hyperlipidemia, dysphagia, anemia, presents from nursing home where he resides permanently with fever and cough  Patient had initially been treated with oral antibiotics with Omnicef, however continued to have persistent fever, cough and shortness of breath  Since presentation to the ER, he was given a dose of cefepime and vancomycin, he was also placed on nasal cannula oxygen with improvement of O2 saturation  At this point he is no longer dyspneic  Review of Systems:    Review of Systems   Constitutional: Positive for fever  HENT: Negative  Eyes: Negative  Respiratory: Positive for cough and shortness of breath  Gastrointestinal: Negative  Endocrine: Negative  Genitourinary: Negative  Musculoskeletal: Negative  Allergic/Immunologic: Negative  Neurological: Negative  Hematological: Negative  Psychiatric/Behavioral: Negative  Past Medical and Surgical History:     Past Medical History:   Diagnosis Date    Cancer ([address] )     leukemia    Dementia     Dermatitis     Edema     Glaucoma     Hyperlipidemia     Neuropathy     Vitamin B 12 deficiency        History reviewed  No pertinent surgical history  Meds/Allergies:    Prior to Admission medications    Medication Sig Start Date End Date Taking?  Authorizing Provider   vitamin B-12 (CYANOCOBALAMIN) 250 MCG tablet Take 250 mcg by mouth daily   Yes Historical Provider, MD   acetaminophen (TYLENOL) 325 mg tablet Take 2 tablets by mouth every 6 (six) hours as needed for mild pain, moderate pain, headaches or fever 2/14/17   Brodie Chavarria DO   albuterol (2 5 mg/3 mL) 0 083 % nebulizer solution Take 3 mL (2 5 mg total) by nebulization every 6 (six) hours as needed for wheezing 5/6/18 5/6/19  Faustino Mackay MD   aspirin 81 mg chewable tablet Chew 81 mg daily    Historical Provider, MD   bimatoprost (LUMIGAN) 0 01 % ophthalmic drops Administer 1 drop to both eyes daily at bedtime    Historical Provider, MD   cefdinir (OMNICEF) 250 mg/5 mL suspension Take 6 mL (300 mg total) by mouth 2 (two) times a day for 10 days 5/6/18 5/16/18  Jony Maravilla MD   cholecalciferol (VITAMIN D3) 1,000 units tablet Take 2 tablets by mouth daily 2/14/17   [employer], DO   cyanocobalamin 250 MCG tablet Take 1 tablet by mouth daily 2/15/17   [employer], DO   donepezil (ARICEPT) 10 mg tablet Take 10 mg by mouth daily at bedtime    Historical Provider, MD   dorzolamide (TRUSOPT) 2 % ophthalmic solution Administer 1 drop to both eyes 2 (two) times a day    Historical Provider, MD   ferrous sulfate 325 (65 Fe) mg tablet Take 325 mg by mouth 2 (two) times a day with meals      Historical Provider, MD   guaiFENesin (MUCINEX) 600 mg 12 hr tablet Take 1 tablet by mouth every 12 (twelve) hours 10/30/17   Elissa Kelly MD   magnesium hydroxide (MILK OF MAGNESIA) 400 mg/5 mL oral suspension Take 30 mL by mouth as needed for constipation      Historical Provider, MD   Memantine HCl ER (NAMENDA XR) 28 MG CP24 Take 1 capsule by mouth daily    Historical Provider, MD   Multiple Vitamin (DAILY ROHIT PO) Take 1 tablet by mouth daily    Historical Provider, MD     I have reveiwed home medications using records provided by [facility]  Allergies: No Known Allergies    Social History:     Marital Status:    Occupation:   Patient Pre-hospital Living Situation:  Resides in [facility]  Patient Pre-hospital Level of Mobility:  Requires assistance with ADL  Patient Pre-hospital Diet Restrictions:   On mechanical soft with nectar thick liquids  Substance Use History:   History   Alcohol Use    Yes     Comment: occasional     History   Smoking Status    Never Smoker   Smokeless Tobacco    Never Used     History   Drug Use       Family History:    non-contributory    Physical Exam:     Vitals:   Blood Pressure: 158/67 (05/09/18 1739)  Pulse: 64 (05/09/18 1739)  Temperature: 98 4 °F (36 9 °C) (05/09/18 1739)  Temp Source: Axillary (05/09/18 1739)  Respirations: 18 (05/09/18 1739)  SpO2: 91 % (05/09/18 1739)    General Appearance:  Alert, cooperative, no distress, appears stated age   Head:  Normocephalic, without obvious abnormality, atraumatic   Neck: Supple   Lungs:   Course rhonchi bilaterally, respirations unlabored   Chest Wall:  No tenderness or deformity    Heart:  Regular rate and rhythm, S1 and S2 normal, no murmur, rub or gallop   Abdomen:   Soft, non-tender, bowel sounds active all four quadrants,  no masses, no organomegaly   Extremities: Extremities normal, atraumatic, no cyanosis or edema, dressing in place to bilateral heel   Pulses: 2+ and symmetric all extremities   Skin: Skin color, texture, turgor normal, no rashes or lesions   Lymph nodes:    Neurologic: CNII-XII intact, speech fluent, comprehensible, no facial asymmetry; normal strength 5/5 in all major muscle groups, sensation and reflexes throughout       Additional Data:     Lab Results: I have personally reviewed pertinent reports  Results from last 7 days  Lab Units 05/09/18  1526   WBC Thousand/uL 4 90   HEMOGLOBIN g/dL 11 3*   HEMATOCRIT % 33 9*   PLATELETS Thousands/uL 131*   NEUTROS PCT % 50   LYMPHS PCT % 44   MONOS PCT % 5   EOS PCT % 0       Results from last 7 days  Lab Units 05/09/18  1526   SODIUM mmol/L 142   POTASSIUM mmol/L 3 8   CHLORIDE mmol/L 107   CO2 mmol/L 25   BUN mg/dL 21   CREATININE mg/dL 1 24   CALCIUM mg/dL 9 8   TOTAL PROTEIN g/dL 6 0*   BILIRUBIN TOTAL mg/dL 0 60   ALK PHOS U/L 85   ALT U/L 24   AST U/L 40   GLUCOSE RANDOM mg/dL 174*       Results from last 7 days  Lab Units 05/09/18  1526   INR  1 13       Imaging: I have personally reviewed pertinent reports        Xr Chest 1 View Portable    Result Date: 5/9/2018  Narrative: CHEST INDICATION:   Cough, fever and shortness of breath  COMPARISON:  5/6/2018, CT chest 10/19/2017 EXAM PERFORMED/VIEWS:  XR CHEST PORTABLE FINDINGS: Cardiomediastinal silhouette appears stable, noting calcified slightly uncoiled thoracic aorta  Patchy peribronchial bibasilar opacities which may reflect subtle infiltrates  The upper lungs are clear  No pneumothorax or pleural effusion  Paravertebral ossifications thoracic spine  Impression: Patchy peribronchial bibasilar opacities which may reflect subtle infiltrates  Workstation performed: [id number]     Xr Chest 1 View Portable    Result Date: 5/6/2018  Narrative: CHEST INDICATION: 44-year-old male, cough, fever COMPARISON:  10/28/2017 chest x-ray EXAM PERFORMED/VIEWS:  XR CHEST PORTABLE FINDINGS: Recurrent opacity at medial right lung base suspicious for pneumonia Cardiomediastinal silhouette appears unremarkable  The lungs are otherwise clear  No pneumothorax or pleural effusion  Osseous structures appear within normal limits for patient age  Impression: Suspected recurrent medial right lung base pneumonia  Continued follow-up to confirm resolution Findings are consistent with emergency room physician's preliminary reading Workstation performed: [id number]       EKG, Pathology, and Other Studies Reviewed on Admission:   · EKG:  Normal sinus rhythm at 87 beats per minute, low voltage, qt prolongation    Allscripts Records Reviewed: Yes     ** Please Note: Dragon 360 Dictation voice to text software may have been used in the creation of this document   **

## 2018-05-09 NOTE — ED PROVIDER NOTES
History  Chief Complaint   Patient presents with    Fever - 75 years or older     Pt brought via EMS for evaluation from [facility]  EMS reports pt was diagnosed with pneumonia on Friday, pt became increased SOB and fever of 102 5       80year-old male patient presents to ER for evaluation of hypoxia, shortness of breath and fever  Recently diagnosed with pneumonia, according the nursing home now getting worse  Today despite antibiotics he continues to run temperature, 102 5 just prior to arrival   He was given 650 of Tylenol p  o  and now his fevers improving  He does state he has trouble breathing  Does not normally require oxygen and today he is requiring between 2 and 4 L by nasal cannula  He states he feels tired  Denies chest pain  No abdominal pain  He has productive cough  History is limited as the patient has baseline dementia  He comes from the dementia unit at the nursing home  I spoke with the patient's son whose made it clear that his living will states the patient is DNR/DNI  Patient's son states the patient gets pneumonia 1-2 times per year and usually improves with abx alone           History provided by:  Patient   used: No    Fever - 75 years or older   Max temp prior to arrival:  102 5  Temp source:  Oral  Severity:  Moderate  Onset quality:  Gradual  Duration:  3 days  Timing:  Intermittent  Progression:  Worsening  Chronicity:  Recurrent  Relieved by:  Acetaminophen  Worsened by:  Nothing  Associated symptoms: cough and somnolence    Associated symptoms: no chest pain, no chills, no congestion, no diarrhea, no dysuria, no headaches, no myalgias, no nausea, no rash, no rhinorrhea, no sore throat and no vomiting    Cough:     Cough characteristics:  Productive    Sputum characteristics:  Unable to specify    Severity:  Mild    Onset quality:  Gradual    Duration:  4 days    Timing:  Sporadic    Progression:  Unchanged    Chronicity:  New  Risk factors: hx of cancer (leukemia)    Risk factors comment:  Nursing home/dementia unit      Prior to Admission Medications   Prescriptions Last Dose Informant Patient Reported? Taking? Memantine HCl ER (NAMENDA XR) 28 MG CP24   Yes No   Sig: Take 1 capsule by mouth daily   Multiple Vitamin (DAILY ROHIT PO)   Yes No   Sig: Take 1 tablet by mouth daily   acetaminophen (TYLENOL) 325 mg tablet   No No   Sig: Take 2 tablets by mouth every 6 (six) hours as needed for mild pain, moderate pain, headaches or fever   albuterol (2 5 mg/3 mL) 0 083 % nebulizer solution   No No   Sig: Take 3 mL (2 5 mg total) by nebulization every 6 (six) hours as needed for wheezing   aspirin 81 mg chewable tablet   Yes No   Sig: Chew 81 mg daily   bimatoprost (LUMIGAN) 0 01 % ophthalmic drops   Yes No   Sig: Administer 1 drop to both eyes daily at bedtime   cefdinir (OMNICEF) 250 mg/5 mL suspension   No No   Sig: Take 6 mL (300 mg total) by mouth 2 (two) times a day for 10 days   cholecalciferol (VITAMIN D3) 1,000 units tablet   No No   Sig: Take 2 tablets by mouth daily   cyanocobalamin 250 MCG tablet   No No   Sig: Take 1 tablet by mouth daily   donepezil (ARICEPT) 10 mg tablet   Yes No   Sig: Take 10 mg by mouth daily at bedtime   dorzolamide (TRUSOPT) 2 % ophthalmic solution   Yes No   Sig: Administer 1 drop to both eyes 2 (two) times a day   ferrous sulfate 325 (65 Fe) mg tablet   Yes No   Sig: Take 325 mg by mouth daily with breakfast   guaiFENesin (MUCINEX) 600 mg 12 hr tablet   No No   Sig: Take 1 tablet by mouth every 12 (twelve) hours   magnesium hydroxide (MILK OF MAGNESIA) 400 mg/5 mL oral suspension   Yes No   Sig: Take by mouth      Facility-Administered Medications: None       Past Medical History:   Diagnosis Date    Cancer ([facility] [address] )     leukemia    Dementia     Dermatitis     Edema     Glaucoma     Hyperlipidemia     Neuropathy     Vitamin B 12 deficiency        History reviewed  No pertinent surgical history      Family History   Problem Relation Age of Onset    No Known Problems Mother     No Known Problems Father      I have reviewed and agree with the history as documented  Social History   Substance Use Topics    Smoking status: Never Smoker    Smokeless tobacco: Never Used    Alcohol use Yes      Comment: occasional        Review of Systems   Constitutional: Positive for fatigue and fever  Negative for activity change, appetite change, chills, diaphoresis and unexpected weight change  HENT: Negative for congestion, rhinorrhea, sinus pressure, sore throat and trouble swallowing  Eyes: Negative for photophobia and visual disturbance  Respiratory: Positive for cough and shortness of breath  Negative for apnea, choking, chest tightness, wheezing and stridor  Cardiovascular: Negative for chest pain, palpitations and leg swelling  Gastrointestinal: Negative for abdominal distention, abdominal pain, blood in stool, constipation, diarrhea, nausea and vomiting  Genitourinary: Negative for decreased urine volume, difficulty urinating, dysuria, enuresis, flank pain, frequency, hematuria and urgency  Musculoskeletal: Negative for arthralgias, myalgias, neck pain and neck stiffness  Skin: Negative for color change, pallor, rash and wound  Allergic/Immunologic: Negative  Neurological: Negative for dizziness, tremors, syncope, weakness, light-headedness, numbness and headaches  Hematological: Negative  Psychiatric/Behavioral: Negative  All other systems reviewed and are negative        Physical Exam  ED Triage Vitals   Temperature Pulse Respirations Blood Pressure SpO2   05/09/18 1525 05/09/18 1511 05/09/18 1511 05/09/18 1511 05/09/18 1511   (!) 101 5 °F (38 6 °C) 84 (!) 23 120/60 (!) 89 %      Temp Source Heart Rate Source Patient Position - Orthostatic VS BP Location FiO2 (%)   05/09/18 1525 05/09/18 1511 05/09/18 1511 05/09/18 1511 --   Rectal Monitor Lying Right arm       Pain Score       -- Orthostatic Vital Signs  Vitals:    05/09/18 1511 05/09/18 1545 05/09/18 1600 05/09/18 1630   BP: 120/60 139/62 137/58 138/55   Pulse: 84 80 79 77   Patient Position - Orthostatic VS: Lying Lying Lying Lying       Physical Exam   Constitutional: He is oriented to person, place, and time  Non-toxic appearance  He has a sickly appearance  He appears ill  No distress  Nasal cannula in place  Tachypnea  Somnolent  HENT:   Head: Normocephalic and atraumatic  Dry mucous membranes   Eyes: EOM and lids are normal  Pupils are equal, round, and reactive to light  Neck: Normal range of motion  Neck supple  Cardiovascular: Normal rate, regular rhythm, S1 normal, S2 normal, normal heart sounds, intact distal pulses and normal pulses  Exam reveals no gallop, no distant heart sounds, no friction rub and no decreased pulses  No murmur heard  Pulses:       Radial pulses are 2+ on the right side, and 2+ on the left side  +2 pitting edema bilaterally   Pulmonary/Chest: Effort normal  No accessory muscle usage  No apnea, no tachypnea and no bradypnea  No respiratory distress  He has no decreased breath sounds  He has no wheezes  He has rhonchi in the right upper field and the right lower field  He has no rales  Crackles along right lung field   Abdominal: Soft  Normal appearance and bowel sounds are normal  He exhibits no distension and no mass  There is no tenderness  There is no rigidity, no rebound and no guarding  No hernia  Musculoskeletal: Normal range of motion  He exhibits no edema, tenderness or deformity  Neurological: He is alert and oriented to person, place, and time  No cranial nerve deficit  GCS eye subscore is 4  GCS verbal subscore is 5  GCS motor subscore is 6  GCS 15  AAOx3  CN II-XII grossly intact  No focal neuro deficits  Skin: Skin is warm, dry and intact  No rash noted  He is not diaphoretic  No erythema  No pallor     Stage 1 decub near buttocks     Psychiatric: His speech is normal    Nursing note and vitals reviewed  ED Medications  Medications   vancomycin (VANCOCIN) 1,250 mg in sodium chloride 0 9 % 250 mL IVPB (not administered)   cefepime (MAXIPIME) IVPB (premix) 2,000 mg (0 mg Intravenous Stopped 5/9/18 1610)   sodium chloride 0 9 % bolus 2,454 mL (2,454 mL Intravenous New Bag 5/9/18 1534)   ipratropium-albuterol (DUO-NEB) 0 5-2 5 mg/3 mL inhalation solution **AcuDose Override Pull** (  Given to EMS 5/9/18 1602)       Diagnostic Studies  Results Reviewed     Procedure Component Value Units Date/Time    Legionella antigen, urine [[id number]] Collected:  05/09/18 1547    Lab Status: In process Specimen:  Urine from Urine, Clean Catch Updated:  05/09/18 1626    Troponin I [[id number]]  (Normal) Collected:  05/09/18 1550    Lab Status:  Final result Specimen:  Blood from Arm, Right Updated:  05/09/18 1613     Troponin I <0 02 ng/mL     Narrative:         [employer] Chemistry analyzer 99% cutoff is > 0 04 ng/mL in network labs    o cTnI 99% cutoff is useful only when applied to patients in the clinical setting of myocardial ischemia  o cTnI 99% cutoff should be interpreted in the context of clinical history, ECG findings and possibly cardiac imaging to establish correct diagnosis  o cTnI 99% cutoff may be suggestive but clearly not indicative of a coronary event without the clinical setting of myocardial ischemia      Urine Microscopic [[id number]]  (Abnormal) Collected:  05/09/18 1547    Lab Status:  Final result Specimen:  Urine from Urine, Straight Cath Updated:  05/09/18 1600     RBC, UA Innumerable (A) /hpf      WBC, UA 2-4 (A) /hpf      Epithelial Cells Occasional /hpf      Bacteria, UA Moderate (A) /hpf     UA w Reflex to Microscopic w Reflex to Culture [[id number]]  (Abnormal) Collected:  05/09/18 1547    Lab Status:  Final result Specimen:  Urine from Urine, Straight Cath Updated:  05/09/18 1555     Color, UA Red     Clarity, UA Turbid     Specific Gravity, UA >=1 030     pH, UA 5 0     Leukocytes, UA Trace (A)     Nitrite, UA Positive (A)     Protein,  (2+) (A) mg/dl      Glucose, UA Negative mg/dl      Ketones, UA Trace (A) mg/dl      Urobilinogen, UA 1 0 E U /dl      Bilirubin, UA Negative     Blood, UA Large (A)    Lactic Acid x2 [[id number]]  (Normal) Collected:  05/09/18 1526    Lab Status:  Final result Specimen:  Blood from Arm, Right Updated:  05/09/18 1552     LACTIC ACID 1 4 mmol/L     Narrative:         Result may be elevated if tourniquet was used during collection  Comprehensive metabolic panel [[id number]]  (Abnormal) Collected:  05/09/18 1526    Lab Status:  Final result Specimen:  Blood from Arm, Right Updated:  05/09/18 1549     Sodium 142 mmol/L      Potassium 3 8 mmol/L      Chloride 107 mmol/L      CO2 25 mmol/L      Anion Gap 10 mmol/L      BUN 21 mg/dL      Creatinine 1 24 mg/dL      Glucose 174 (H) mg/dL      Calcium 9 8 mg/dL      AST 40 U/L      ALT 24 U/L      Alkaline Phosphatase 85 U/L      Total Protein 6 0 (L) g/dL      Albumin 2 9 (L) g/dL      Total Bilirubin 0 60 mg/dL      eGFR 49 ml/min/1 73sq m     Narrative:         National Kidney Disease Education Program recommendations are as follows:  GFR calculation is accurate only with a steady state creatinine  Chronic Kidney disease less than 60 ml/min/1 73 sq  meters  Kidney failure less than 15 ml/min/1 73 sq  meters      APTT [[id number]]  (Abnormal) Collected:  05/09/18 1526    Lab Status:  Final result Specimen:  Blood from Arm, Right Updated:  05/09/18 1545     PTT 52 (H) seconds     Protime-INR [[id number]]  (Abnormal) Collected:  05/09/18 1526    Lab Status:  Final result Specimen:  Blood from Arm, Right Updated:  05/09/18 1545     Protime 14 8 (H) seconds      INR 1 13    CBC and differential [[id number]]  (Abnormal) Collected:  05/09/18 1526    Lab Status:  Final result Specimen:  Blood from Arm, Right Updated:  05/09/18 1539     WBC 4 90 Thousand/uL      RBC 3 46 (L) Million/uL      Hemoglobin 11 3 (L) g/dL      Hematocrit 33 9 (L) %      MCV 98 fL      MCH 32 7 pg      MCHC 33 3 g/dL      RDW 13 2 %      MPV 9 7 fL      Platelets 500 (L) Thousands/uL      nRBC 0 /100 WBCs      Neutrophils Relative 50 %      Lymphocytes Relative 44 %      Monocytes Relative 5 %      Eosinophils Relative 0 %      Basophils Relative 1 %      Neutrophils Absolute 2 44 Thousands/µL      Lymphocytes Absolute 2 15 Thousands/µL      Monocytes Absolute 0 24 Thousand/µL      Eosinophils Absolute 0 02 Thousand/µL      Basophils Absolute 0 03 Thousands/µL     Influenza A/B and RSV by PCR (indicated for patients >2 mo of age) [[id number]] Collected:  05/09/18 1530    Lab Status: In process Specimen:  Nasopharyngeal from Nasopharyngeal Swab Updated:  05/09/18 1532    Blood culture #1 [[id number]] Collected:  05/09/18 1528    Lab Status: In process Specimen:  Blood from Arm, Left Updated:  05/09/18 1532    Blood culture #2 [[id number]] Collected:  05/09/18 1528    Lab Status: In process Specimen:  Blood from Arm, Right Updated:  05/09/18 1532                 XR chest 1 view portable   ED Interpretation by Denise Sarmiento PA-C (05/09 1547)   Right infiltrate      Final Result by Cody Morales DO (05/09 1619)      Patchy peribronchial bibasilar opacities which may reflect subtle infiltrates  Workstation performed: [id number]                    Procedures  ECG 12 Lead Documentation  Date/Time: 5/9/2018 3:57 PM  Performed by: Milton Ortega  Authorized by: Milton Ortega     Indications / Diagnosis:  Sob  ECG reviewed by me, the ED Provider: yes    Patient location:  ED  Previous ECG:     Previous ECG:  Compared to current    Comparison ECG info:   May 6, 2018    Similarity:  No change    Comparison to cardiac monitor: No    Interpretation:     Interpretation: non-specific    Quality:     Tracing quality:  Limited by artifact  Rate:     ECG rate:  87    ECG rate assessment: normal    Rhythm:     Rhythm: sinus rhythm    Ectopy: Ectopy: none    QRS:     QRS axis:  Right    QRS intervals:  Normal  Conduction:     Conduction: normal    ST segments:     ST segments:  Normal  T waves:     T waves: non-specific        CriticalCare Time  Performed by: Pilar Bethea by: Florentin Rodriguez     Critical care provider statement:     Critical care time (minutes):  32    Critical care time was exclusive of:  Separately billable procedures and treating other patients    Critical care was necessary to treat or prevent imminent or life-threatening deterioration of the following conditions:  Sepsis    Critical care was time spent personally by me on the following activities:  Blood draw for specimens, obtaining history from patient or surrogate, development of treatment plan with patient or surrogate, evaluation of patient's response to treatment, examination of patient, re-evaluation of patient's condition, ordering and review of radiographic studies, ordering and review of laboratory studies and ordering and performing treatments and interventions  Comments:      Critical care time for management of sepsis as well as lengthy discussion and obtaining history from son over phone            Phone Contacts  ED Phone Contact    ED Course  ED Course as of May 09 1640   Wed May 09, 2018   [address] [facility] (patient's son): cell phone [phone]          HEART Risk Score      Most Recent Value   History  0 Filed at: 05/09/2018 1615   ECG  1 Filed at: 05/09/2018 1615   Age  2 Filed at: 05/09/2018 1615   Risk Factors  1 Filed at: 05/09/2018 1615   Troponin  0 Filed at: 05/09/2018 1615   Heart Score Risk Calculator   History  0 Filed at: 05/09/2018 1615   ECG  1 Filed at: 05/09/2018 1615   Age  2 Filed at: 05/09/2018 1615   Risk Factors  1 Filed at: 05/09/2018 1615   Troponin  0 Filed at: 05/09/2018 1615   HEART Score  4 Filed at: 05/09/2018 1615   HEART Score  4 Filed at: 05/09/2018 1615                      Initial Sepsis Screening     Row Name 05/09/18 1558                Is the patient's history suggestive of a new or worsening infection? (!)  Yes (Proceed)  -CO        Suspected source of infection pneumonia  -CO        Are two or more of the following signs & symptoms of infection both present and new to the patient? (!)  Yes (Proceed)  -CO        Indicate SIRS criteria Hyperthemia > 38 3C (100 9F); Tachypnea > 20 resp per min  -CO        If the answer is yes to both questions, suspicion of sepsis is present          If severe sepsis is present AND tissue hypoperfusion perists in the hour after fluid resuscitation or lactate > 4, the patient meets criteria for SEPTIC SHOCK          Are any of the following organ dysfunction criteria present within 6 hours of suspected infection and SIRS criteria that are NOT considered to be chronic conditions? No  -CO        Organ dysfunction          Date of presentation of severe sepsis          Time of presentation of severe sepsis          Tissue hypoperfusion persists in the hour after crystalloid fluid administration, evidenced, by either:          Was hypotension present within one hour of the conclusion of crystalloid fluid administration?         Date of presentation of septic shock          Time of presentation of septic shock            User Key  (r) = Recorded By, (t) = Taken By, (c) = Cosigned By    Initials Name Provider Type    CO Yulia Corey PA-C Physician Assistant                  MDM  Number of Diagnoses or Management Options  Hypoxia: new and requires workup  Pneumonia: new and requires workup  Sepsis [facility]): new and requires workup  UTI (urinary tract infection): new and requires workup  Diagnosis management comments: DDX including but not limited to: pneumonia, pleural effusion, CHF, PE, PTX, ACS, MI, asthma exacerbation, COPD exacerbation, anemia, metabolic abnormality, renal failure  Plan:  Meets sepsis criteria    Will treat with antibiotics, vancomycin and given he lives in nursing home/assisted care  Cardiac workup  Spoke with patient's son, he is certain the patient would not want heroic measures and confirms that he is DNR/DNI  Amount and/or Complexity of Data Reviewed  Clinical lab tests: ordered and reviewed  Tests in the radiology section of CPT®: reviewed and ordered  Independent visualization of images, tracings, or specimens: yes    Risk of Complications, Morbidity, and/or Mortality  Presenting problems: moderate  Management options: moderate  General comments: 81 y/o male with sob and fever  Meets sepsis criteria  Likely sepsis secondary to pneumonia and UTI  He was started on antibiotics, vancomycin for possible healthcare associated PNA given his residence at a nursing facility  Cefepime was also given which will also cover for his UTI  Do not suspect aspiration  Flu swab sent  Fever improving  Vitals improving  Hypoxia and tachypnea improved with O2 by nasal cannula  Stable at time of admission  Discussed patient's care with his son, Luba Arambula, who advises pt is DNR/DNI  Patient Progress  Patient progress: stable    CritCare Time    Disposition  Final diagnoses:   Pneumonia   Sepsis ([facility] [address] )   UTI (urinary tract infection)   Hypoxia     Time reflects when diagnosis was documented in both MDM as applicable and the Disposition within this note     Time User Action Codes Description Comment    5/9/2018  4:16 PM Brown Mar [J18 9] Pneumonia     5/9/2018  4:16 PM Lyubov Eastern L Add [A41 9] Sepsis ([facility] [address] )     5/9/2018  4:16 PM Lyubov Eastern L Add [N39 0] UTI (urinary tract infection)     5/9/2018  4:16 PM Madonna Jackson Add [R09 02] Hypoxia       ED Disposition     ED Disposition Condition Comment    Admit  Case was discussed with Dr Marc Herrera and the patient's admission status was agreed to be Admission Status: inpatient status to the service of Dr Marc Herrera          Follow-up Information    None       Patient's Medications   Discharge Prescriptions    No medications on file     No discharge procedures on file      ED Provider  Electronically Signed by           Wanda Crouch PA-C  05/09/18 1640

## 2018-05-09 NOTE — ED NOTES
Pt given 650mg Tylenol and 1 albuterol treatment via [facility] prior to transport       Raheel Aparicio RN  05/09/18 1148

## 2018-05-10 PROBLEM — J98.4 ACUTE PULMONARY INSUFFICIENCY: Status: ACTIVE | Noted: 2017-02-13

## 2018-05-10 LAB
ANION GAP SERPL CALCULATED.3IONS-SCNC: 7 MMOL/L (ref 4–13)
BUN SERPL-MCNC: 18 MG/DL (ref 5–25)
CALCIUM SERPL-MCNC: 9.1 MG/DL (ref 8.3–10.1)
CHLORIDE SERPL-SCNC: 109 MMOL/L (ref 100–108)
CO2 SERPL-SCNC: 28 MMOL/L (ref 21–32)
CREAT SERPL-MCNC: 0.97 MG/DL (ref 0.6–1.3)
ERYTHROCYTE [DISTWIDTH] IN BLOOD BY AUTOMATED COUNT: 13.2 % (ref 11.6–15.1)
FLUAV AG SPEC QL: NORMAL
FLUBV AG SPEC QL: NORMAL
GFR SERPL CREATININE-BSD FRML MDRD: 66 ML/MIN/1.73SQ M
GLUCOSE SERPL-MCNC: 116 MG/DL (ref 65–140)
HCT VFR BLD AUTO: 33.7 % (ref 36.5–49.3)
HGB BLD-MCNC: 11 G/DL (ref 12–17)
MCH RBC QN AUTO: 32.7 PG (ref 26.8–34.3)
MCHC RBC AUTO-ENTMCNC: 32.6 G/DL (ref 31.4–37.4)
MCV RBC AUTO: 100 FL (ref 82–98)
PLATELET # BLD AUTO: 113 THOUSANDS/UL (ref 149–390)
PMV BLD AUTO: 10.2 FL (ref 8.9–12.7)
POTASSIUM SERPL-SCNC: 3.9 MMOL/L (ref 3.5–5.3)
RBC # BLD AUTO: 3.36 MILLION/UL (ref 3.88–5.62)
RSV B RNA SPEC QL NAA+PROBE: NORMAL
SODIUM SERPL-SCNC: 144 MMOL/L (ref 136–145)
WBC # BLD AUTO: 4.91 THOUSAND/UL (ref 4.31–10.16)

## 2018-05-10 PROCEDURE — 80048 BASIC METABOLIC PNL TOTAL CA: CPT | Performed by: INTERNAL MEDICINE

## 2018-05-10 PROCEDURE — 92610 EVALUATE SWALLOWING FUNCTION: CPT

## 2018-05-10 PROCEDURE — 99233 SBSQ HOSP IP/OBS HIGH 50: CPT | Performed by: INTERNAL MEDICINE

## 2018-05-10 PROCEDURE — 85027 COMPLETE CBC AUTOMATED: CPT | Performed by: INTERNAL MEDICINE

## 2018-05-10 RX ADMIN — FERROUS SULFATE TAB 325 MG (65 MG ELEMENTAL FE) 325 MG: 325 (65 FE) TAB at 17:06

## 2018-05-10 RX ADMIN — GUAIFENESIN AND DEXTROMETHORPHAN 10 ML: 100; 10 SYRUP ORAL at 08:40

## 2018-05-10 RX ADMIN — DORZOLAMIDE HYDROCHLORIDE 1 DROP: 20 SOLUTION/ DROPS OPHTHALMIC at 17:07

## 2018-05-10 RX ADMIN — HEPARIN SODIUM 5000 UNITS: 5000 INJECTION, SOLUTION INTRAVENOUS; SUBCUTANEOUS at 21:03

## 2018-05-10 RX ADMIN — FERROUS SULFATE TAB 325 MG (65 MG ELEMENTAL FE) 325 MG: 325 (65 FE) TAB at 08:40

## 2018-05-10 RX ADMIN — ASPIRIN 81 MG 81 MG: 81 TABLET ORAL at 08:40

## 2018-05-10 RX ADMIN — MEMANTINE 10 MG: 10 TABLET ORAL at 08:41

## 2018-05-10 RX ADMIN — ACETAMINOPHEN 650 MG: 325 TABLET, FILM COATED ORAL at 08:40

## 2018-05-10 RX ADMIN — GUAIFENESIN 600 MG: 600 TABLET, EXTENDED RELEASE ORAL at 21:03

## 2018-05-10 RX ADMIN — DONEPEZIL HYDROCHLORIDE 10 MG: 5 TABLET ORAL at 21:03

## 2018-05-10 RX ADMIN — CYANOCOBALAMIN TAB 500 MCG 250 MCG: 500 TAB at 08:41

## 2018-05-10 RX ADMIN — HEPARIN SODIUM 5000 UNITS: 5000 INJECTION, SOLUTION INTRAVENOUS; SUBCUTANEOUS at 05:33

## 2018-05-10 RX ADMIN — CEFEPIME HYDROCHLORIDE 1000 MG: 1 INJECTION, SOLUTION INTRAVENOUS at 09:01

## 2018-05-10 RX ADMIN — GUAIFENESIN 600 MG: 600 TABLET, EXTENDED RELEASE ORAL at 11:16

## 2018-05-10 RX ADMIN — DORZOLAMIDE HYDROCHLORIDE 1 DROP: 20 SOLUTION/ DROPS OPHTHALMIC at 08:42

## 2018-05-10 RX ADMIN — BIMATOPROST 1 DROP: 0.1 SOLUTION/ DROPS OPHTHALMIC at 21:02

## 2018-05-10 RX ADMIN — ACETAMINOPHEN 650 MG: 325 TABLET, FILM COATED ORAL at 21:04

## 2018-05-10 RX ADMIN — VANCOMYCIN HYDROCHLORIDE 1750 MG: 1 INJECTION, POWDER, LYOPHILIZED, FOR SOLUTION INTRAVENOUS at 11:19

## 2018-05-10 RX ADMIN — VITAMIN D, TAB 1000IU (100/BT) 2000 UNITS: 25 TAB at 08:40

## 2018-05-10 RX ADMIN — CEFEPIME HYDROCHLORIDE 1000 MG: 1 INJECTION, SOLUTION INTRAVENOUS at 21:03

## 2018-05-10 RX ADMIN — HEPARIN SODIUM 5000 UNITS: 5000 INJECTION, SOLUTION INTRAVENOUS; SUBCUTANEOUS at 14:12

## 2018-05-10 NOTE — CASE MANAGEMENT
Initial Clinical Review    Admission: Date/Time/Statement: 5/9/18 @ 1633     Orders Placed This Encounter   Procedures    Inpatient Admission (expected length of stay for this patient is greater than two midnights)     Standing Status:   Standing     Number of Occurrences:   1     Order Specific Question:   Admitting Physician     Answer:   Michael Marin     Order Specific Question:   Level of Care     Answer:   Med Surg [16]     Order Specific Question:   Estimated length of stay     Answer:   More than 2 Midnights     Order Specific Question:   Certification     Answer:   I certify that inpatient services are medically necessary for this patient for a duration of greater than two midnights  See H&P and MD Progress Notes for additional information about the patient's course of treatment  ED: Date/Time/Mode of Arrival:   ED Arrival Information     Expected Arrival Acuity Means of Arrival Escorted By Service Admission Type    - 5/9/2018 15:07 Emergent Ambulance [facility] EMS General Medicine Emergency    Arrival Complaint    fever          Chief Complaint:   Chief Complaint   Patient presents with    Fever - 75 years or older     Pt brought via EMS for evaluation from [facility]  EMS reports pt was diagnosed with pneumonia on Friday, pt became increased SOB and fever of 102 5  History of Illness: History from patient is limited secondary to hard of hearing and dementia   Reji Salas is a 80 y o  male medical history significant for dementia, hyperlipidemia, dysphagia, anemia, presents from nursing home where he resides permanently with fever and cough  Patient had initially been treated with oral antibiotics with Omnicef, however continued to have persistent fever, cough and shortness of breath    Since presentation to the ER, he was given a dose of cefepime and vancomycin, he was also placed on nasal cannula oxygen with improvement of O2 saturation    At this point he is no longer dyspneic  ED Vital Signs:   ED Triage Vitals   Temperature Pulse Respirations Blood Pressure SpO2   05/09/18 1525 05/09/18 1511 05/09/18 1511 05/09/18 1511 05/09/18 1511   (!) 101 5 °F (38 6 °C) 84 (!) 23 120/60 (!) 89 %      Temp Source Heart Rate Source Patient Position - Orthostatic VS BP Location FiO2 (%)   05/09/18 1525 05/09/18 1511 05/09/18 1511 05/09/18 1511 --   Rectal Monitor Lying Right arm       Pain Score       05/09/18 1825       No Pain        Wt Readings from Last 1 Encounters:   05/09/18 82 5 kg (181 lb 14 1 oz)       Vital Signs (abnormal):   05/09/18 1600 -- 79  26 137/58 95 % -- [facility]   05/09/18 1545 -- 80  26 139/62 96 % -- [facility]   05/09/18 1525  101 5 °F (38 6 °C) -- -- -- -- -- [facility]   05/09/18 1512 -- -- -- -- 90 %       Abnormal Labs/Diagnostic Test Results: gluc   174, total prot  6 0, alb  2 9, ptt 52, pt inr  14 8 1 13, H&H   11 3   33 9, plt   131  PCXR -     Patchy peribronchial bibasilar opacities which may reflect subtle infiltrates        EKG- NSR      ED Treatment:   Medication Administration from 05/09/2018 1506 to 05/09/2018 1727       Date/Time Order Dose Route Action Action by Comments     05/09/2018 1602 ipratropium-albuterol (DUO-NEB) 0 5-2 5 mg/3 mL inhalation solution **AcuDose Override Pull** 0   Given to EMS Sonia Pappas RN      05/09/2018 1536 ipratropium-albuterol (DUO-NEB) 0 5-2 5 mg/3 mL inhalation solution **AcuDose Override Pull**    Not Given Norman Florentino, RN Medication given to EMS via another RN      05/09/2018 1640 vancomycin (VANCOCIN) 1,250 mg in sodium chloride 0 9 % 250 mL IVPB 1,250 mg Intravenous [address] Norman Florentino, RN      05/09/2018 1610 cefepime (MAXIPIME) IVPB (premix) 2,000 mg 0 mg Intravenous Stopped Norman Florentino RN      05/09/2018 1536 cefepime (MAXIPIME) IVPB (premix) 2,000 mg 2,000 mg Intravenous [address] Norman Florentino RN      05/09/2018 1726 sodium chloride 0 9 % bolus 2,454 mL 0 mL/kg Intravenous Stopped Norman School, RN      05/09/2018 1534 sodium chloride 0 9 % bolus 2,454 mL 2,454 mL Intravenous New Bag Osmany Newell RN           Past Medical/Surgical History:    Active Ambulatory Problems     Diagnosis Date Noted    Healthcare-associated pneumonia 01/31/2017    Hypoxia 01/31/2017    Generalized weakness 01/31/2017    Acute respiratory insufficiency 02/13/2017    Hypokalemia 02/13/2017    Anemia 02/13/2017    Leukopenia 02/13/2017    Dysphagia 02/13/2017    Hyperlipidemia 02/13/2017    Dementia 02/13/2017    Acute sinusitis 02/13/2017    Hypophosphatemia 02/14/2017    Vitamin D deficiency 02/14/2017    Cerebral atrophy 02/14/2017    Cerebellar atrophy 02/14/2017    Pneumonia 10/19/2017    Dementia 10/19/2017    Glaucoma 10/19/2017    Personal history of CLL (chronic lymphocytic leukemia) 10/19/2017    Thrombocytopenia ([facility] [address] ) 10/23/2017     Resolved Ambulatory Problems     Diagnosis Date Noted    Lactic acidosis 10/19/2017    Sepsis ([facility] [address] ) 10/25/2017    Decubitus ulcer of buttock, stage 3 ([facility] [address] ) 10/25/2017     Past Medical History:   Diagnosis Date    Cancer ([facility] [address] )     Dementia     Dermatitis     Edema     Glaucoma     Hyperlipidemia     Neuropathy     Vitamin B 12 deficiency        Admitting Diagnosis: UTI (urinary tract infection) [N39 0]  Pneumonia [J18 9]  Hypoxia [R09 02]  Fever [R50 9]  Sepsis ([address] ) [A41 9]    Age/Sex: 80 y o  male    Assessment/Plan: Hospital Problem List:    Principal Problem:    Healthcare-associated pneumonia  Active Problems:    Sepsis (HCC)   Plan for the Primary Problem(s):   # Sepsis, meets SIRS criteria with fever and increased respiratory rate - secondary to healthcare associated pneumonia given permanent residence [address] outpatient therapy with Omnicef  - place on broad-spectrum antibiotics  - monitor fever curve  - follow-up blood culture  - follow-up strep, Legionella and influenza/RSV  - patient already with baseline history of dysphagia secondary to dementia and is already on dysphagia modified diet, will have speech therapy evaluate to see if there needs to be any change in his diet  - continue supportive care  - nl lactic acid  # Respiratory insufficiency secondary to above  Improvement in oxygenation with nasal cannular  Wean off oxygen as patient clinically improves   Plan for Additional Problems:   # Dementia - cont meds   # CKD stg III - stable   Disposition:  Plan of care discussed with his son Lexx Leija (); code status addressed, patient has living will in place he is DNR DNI  VTE Prophylaxis: Heparin  / sequential compression device   Code Status:  DNR DNI  POLST: There is no POLST form on file for this patient (pre-hospital)   Anticipated Length of Stay:  Patient will be admitted on an Inpatient basis with an anticipated length of stay of  more than 2 midnights     Justification for Hospital Stay:  Sepsis       Admission Orders:  Scheduled Meds:   Current Facility-Administered Medications:  acetaminophen 650 mg Oral Q6H PRN Launie Rho, DO    albuterol 2 5 mg Nebulization Q6H PRN Launie Rho, DO    aspirin 81 mg Oral Daily Launie Rho, DO    bimatoprost 1 drop Both Eyes HS Launie Rho, DO    cefepime 1,000 mg Intravenous Q12H Launie Rho, DO Last Rate: 1,000 mg (05/10/18 0901)   cholecalciferol 2,000 Units Oral Daily Launie Rho, DO    cyanocobalamin 250 mcg Oral Daily Launie Rho, DO    dextromethorphan-guaiFENesin 10 mL Oral Q4H PRN Launie Rho, DO    donepezil 10 mg Oral HS Launie Rho, DO    dorzolamide 1 drop Both Eyes BID Launie Rho, DO    ferrous sulfate 325 mg Oral BID With Meals Launie Rho, DO    guaiFENesin 600 mg Oral Q12H [facility] Launie Rho, DO    heparin (porcine) 5,000 Units Subcutaneous Q8H [facility] Launie Rho, DO    memantine 10 mg Oral Daily Launie Rho, DO    ondansetron 4 mg Intravenous Q6H PRN Launie Rho, DO    vancomycin 20 mg/kg Intravenous Q24H Leva Keren, DO Last Rate: 1,750 mg (05/10/18 1119)     Continuous Infusions:    PRN Meds:   acetaminophen    albuterol    dextromethorphan-guaiFENesin    ondansetron    Dysphagia eval   SCD  Speech eval   Up w/ assist     5/10 cbc , bmp    Cl   109 , H&H   11 0  33 7, plt ct   113

## 2018-05-10 NOTE — PLAN OF CARE

## 2018-05-10 NOTE — PROGRESS NOTES
[facility] Internal Medicine Progress Note  Patient: Gab Petero 80 y o  male   MRN: [id number]  PCP: Elieser Vaca DO  Unit/Bed#: -01 Encounter: [id number]  Date Of Visit: 05/10/18    Assessment/Plan:    Principal Problem:    Healthcare-associated pneumonia  Active Problems:    Generalized weakness    Acute pulmonary insufficiency    Dementia    Sepsis ([address] )    Present on Admission:   Sepsis ([address] )   Healthcare-associated pneumonia   Generalized weakness   Acute pulmonary insufficiency   Dementia      · Sepsis secondary to healthcare associated pneumonia  Patient is a resident of a nursing home  He has underlying dementia  Not sure if he is aspirating or not  Continue with current treatment  Bacterial etiology for pneumonia at the moment unknown  Continue with empiric IV antibiotics  I also ask for sputum cultures to be done  Also ordered a speech evaluation  · Acute pulmonary insufficiency secondary to above  Treatment as above as above  · Dementia  Continue with Namenda  · Generalized weakness  PT/OT  He is 80years old  Not sure what his activity level is  He has some sores on his heels  Ordered special mattress  · Thrombocytopenia  ? Secondary to infectious process  Follow-up as needed      VTE Pharmacologic Prophylaxis:   Pharmacologic: Heparin  Mechanical VTE Prophylaxis in Place: Yes    Patient Centered Rounds: I have performed bedside rounds with nursing staff today  Discussions with Specialists or Other Care Team Provider:  Yes    Education and Discussions with Family / Patient:  Yes        Current Length of Stay: 1 day(s)    Current Patient Status: Inpatient   Certification Statement: The patient will continue to require additional inpatient hospital stay due to IV antibiotics    Discharge Plan:  Back to facility when stable    Code Status: Level 3 - DNAR and DNI      Subjective:   Patient himself has underlying dementia and is a poor historian    He appears short of breath although he is not complaining of having any trouble breathing  His main complaint was that he could not see me because he is not wearing his glasses  He could hear me an answer some of the questions as above  He denies any abdominal pain  Denies any nausea or vomiting    Objective:     Vitals:   Temp (24hrs), Av 6 °F (37 6 °C), Min:98 4 °F (36 9 °C), Max:101 5 °F (38 6 °C)    HR:  [64-85] 85  Resp:  [18-26] 24  BP: (120-158)/(55-72) 158/72  SpO2:  [89 %-96 %] 91 %  Body mass index is 24 67 kg/m²  Input and Output Summary (last 24 hours): Intake/Output Summary (Last 24 hours) at 05/10/18 1256  Last data filed at 05/10/18 0830   Gross per 24 hour   Intake              790 ml   Output              500 ml   Net              290 ml           Physical Exam:     Vital signs are reviewed as above  Constitutional:  Patient in bed  He is apparently short of breath  I could hear some gurgling sounds in his throat  Eyes:  Could hardly CV because of underlying poor vision and not wearing his glasses  HENT: Oropharynx are moist    Neck:  Did not order any visible thyromegaly or lymphadenopathy   Cardiac: I did not hear any rubs or gallop  Patient appears to be in sinus rhythm  Respiratory:  Short of breath as above  He also has audible crackles on auscultation mostly coarse with decreased breath sounds at the bases   GI: Abdomen is soft  It is grossly nontender  Bowel sounds are adequate  I was not able to appreciate any hepatosplenomegaly  Neurologic:  Patient is awake and alert  Pleasantly confused  Skin: Skin is warm and dry  Has sores on his heels-has protection  Psychiatric:  Pleasant  Musculoskeletal   Has chronic arthritic joints  Extremities:  No significant edema of his legs    I also did not notice any cyanosis or clubbing      Additional Data:     Labs:      Results from last 7 days  Lab Units 05/10/18  0531 18  1526   WBC Thousand/uL 4 91 4 90   HEMOGLOBIN g/dL 11 0* 11 3*   HEMATOCRIT % 33 7* 33 9*   PLATELETS Thousands/uL 113* 131*   NEUTROS PCT %  --  50   LYMPHS PCT %  --  44   MONOS PCT %  --  5   EOS PCT %  --  0       Results from last 7 days  Lab Units 05/10/18  0531 05/09/18  1526   SODIUM mmol/L 144 142   POTASSIUM mmol/L 3 9 3 8   CHLORIDE mmol/L 109* 107   CO2 mmol/L 28 25   BUN mg/dL 18 21   CREATININE mg/dL 0 97 1 24   CALCIUM mg/dL 9 1 9 8   TOTAL PROTEIN g/dL  --  6 0*   BILIRUBIN TOTAL mg/dL  --  0 60   ALK PHOS U/L  --  85   ALT U/L  --  24   AST U/L  --  40   GLUCOSE RANDOM mg/dL 116 174*       Results from last 7 days  Lab Units 05/09/18  1526   INR  1 13       * I Have Reviewed All Lab Data Listed Above  * Additional Pertinent Lab Tests Reviewed:  All Labs Within Last 24 Hours Reviewed      Recent Cultures (last 7 days):       Results from last 7 days  Lab Units 05/09/18  1547 05/09/18  1530   INFLUENZA A PCR   --  None Detected   INFLUENZA B PCR   --  None Detected   RSV PCR   --  None Detected   LEGIONELLA URINARY ANTIGEN  Negative  --        Last 24 Hours Medication List:     Current Facility-Administered Medications:  acetaminophen 650 mg Oral Q6H PRN Raghavendra Hazy, DO    albuterol 2 5 mg Nebulization Q6H PRN Raghavendra Hazy, DO    aspirin 81 mg Oral Daily Rahgavendra Hazy, DO    bimatoprost 1 drop Both Eyes HS Raghavendra Hazy, DO    cefepime 1,000 mg Intravenous Q12H Raghavendra Hazy, DO Last Rate: 1,000 mg (05/10/18 0901)   cholecalciferol 2,000 Units Oral Daily Raghavendra Hazy, DO    cyanocobalamin 250 mcg Oral Daily Raghavendra Hazy, DO    dextromethorphan-guaiFENesin 10 mL Oral Q4H PRN Raghavendra Hazy, DO    donepezil 10 mg Oral HS Raghavendra Hazy, DO    dorzolamide 1 drop Both Eyes BID Raghavendra Hazy, DO    ferrous sulfate 325 mg Oral BID With Meals Raghavendra Hazy, DO    guaiFENesin 600 mg Oral Q12H [address] Raghavendra Hazy, DO    heparin (porcine) 5,000 Units Subcutaneous Q8H [address] Raghavendra Jolley DO    memantine 10 mg Oral Daily Daniellette Christine Mich,     ondansetron 4 mg Intravenous Q6H PRN Mj Mohair, DO    vancomycin 20 mg/kg Intravenous Q24H Mj Mohair, DO Last Rate: 1,750 mg (05/10/18 1119)        Today, Patient Was Seen By: Padma Rodriguez MD    ** Please Note: Dragon 360 Dictation voice to text software may have been used in the creation of this document   **

## 2018-05-10 NOTE — SPEECH THERAPY NOTE
Speech-Language Pathology Bedside Swallow Evaluation        Patient Name: Renee ROCHA Date: 5/10/2018     Problem List  Patient Active Problem List   Diagnosis    Healthcare-associated pneumonia    Hypoxia    Generalized weakness    Acute pulmonary insufficiency    Hypokalemia    Anemia    Leukopenia    Dysphagia    Hyperlipidemia    Dementia    Acute sinusitis    Hypophosphatemia    Vitamin D deficiency    Cerebral atrophy    Cerebellar atrophy    Pneumonia    Dementia    Glaucoma    Personal history of CLL (chronic lymphocytic leukemia)    Thrombocytopenia (HCC)    Sepsis ([facility] [address] )       Past Medical History  Past Medical History:   Diagnosis Date    Cancer ([facility] [address] )     leukemia    Dementia     Dermatitis     Edema     Glaucoma     Hyperlipidemia     Neuropathy     Vitamin B 12 deficiency        Past Surgical History  History reviewed  No pertinent surgical history  Current Medical Status  Pt is a 80 y o  male resident of [place] who presented to [facility] with PNA  ST consulted to r/o aspiration  The pt is known to this service from prior admissions and was last seen on 10/25/17; he presented with s/sx aspiration with PO intake at that time, however, the pt and family had elected to continue PO intake despite the risk of aspiration and PNA, and had likewise done so on previous admissions  At the time of this evaluation, the pt was seated upright in bed, A+A, reported "I'm not feeling too good" but denied pain or specific complaints  He was quite pleasant and cooperative with the evaluation  Imaging Studies:  CXR 5/9 Patchy peribronchial bibasilar opacities which may reflect subtle infiltrates        Swallow Information   Current Risks for Dysphagia & Aspiration: known history of dysphagia     Current Symptoms/Concerns: PNA    Current Diet: mechanically altered/level 2 diet and nectar thick liquids      Baseline Diet: mechanically altered/level 2 diet and nectar thick liquids      Baseline Assessment   Behavior/Cognition: alert, oriented to person    Speech/Language Status: able to participate in conversation and able to follow commands; hard of hearing    Patient Positioning: upright in bed    Pain Status/Interventions/Response to Interventions:  No report of or nonverbal indications of pain  Swallow Mechanism Exam   Facial: symmetrical  Labial: bilateral decreased ROM and decreased strength  Lingual: bilateral decreased ROM and decreased strength  Velum: symmetrical  Mandible: adequate ROM  Dentition: adequate  Vocal quality: wet/congested   Volitional Cough: strong/productive; constant wet, congested cough at baseline  Swallow Mechanics: mildly delayed/effortful hyolaryngeal elevation with dry swallow      Consistencies Assessed and Performance   Consistencies Administered: nectar thick, honey thick and puree  Specific materials administered included nectar-thick apple juice via cup and spoon, honey-thick orange juice via cup and spoon, and vanilla pudding  Wet, congested breath sounds at baseline via cervical auscultation  Oral Stage: moderate  Prolonged oral manipulation and lingual pumping with all textures  Piecemeal transit with puree  Pharyngeal Stage: severe   Delayed and effortful hyolaryngeal elevation with all textures  +wet cough after all PO trials, however, noted increased wet breath sounds after nectar-thick liquids via both cup and spoon  Esophageal Concerns: none reported or observed        Summary   Pt presented with moderate oral and severe pharyngeal dysphagia; aspiration could not be decisively ruled out due to the presence of constant wet coughing at baseline, however, increased wet coughing and wet breath sounds were observed with some trials   Discussion of plan of care should remain ongoing with the patient and his family; the patient is known to chronically aspirate and thus far the patient and family have preferred to continue PO intake despite the risks of aspiration and pneumonia  Risk for Aspiration: High    Recommendations: mechanically altered/level 2 diet and honey thick liquids     Recommended Form of Meds: whole with liquid or whole with puree     Aspiration precautions and compensatory swallowing strategies: upright posture, only feed when fully alert, slow rate of feeding, small bites/sips, liquids by teaspoon only and quiet environment (tv off, limit talking, door closed, etc )    Results Reviewed with: patient and MD     Frequency of treatment: 2x/wk    Patient Stated Goal: "feel better"    Dysphagia Goals per SLP: Patient will tolerate the safest and least restrictive diet without overt s/sx aspiration x100%    Pt/Family Education: Pt unable to participate in education 2/2 mental status  No family present at time of eval       Speech Therapy Prognosis   Prognosis: deferred    Prognosis Considerations: prior medical history, cognitive status, medically fragile status and therapeutic potential      Plan  Recommend Dysphagia 2/Mechanical Soft and Honey-Thick Liquid diet  Liquids via spoon  Meds whole in liquid or puree  ST will continue to follow for dysphagia tx

## 2018-05-10 NOTE — PLAN OF CARE
DISCHARGE PLANNING     Discharge to home or other facility with appropriate resources Progressing        HEMATOLOGIC - ADULT     Maintains hematologic stability Progressing        INFECTION - ADULT     Absence or prevention of progression during hospitalization Progressing     Absence of fever/infection during neutropenic period Progressing        Knowledge Deficit     Patient/family/caregiver demonstrates understanding of disease process, treatment plan, medications, and discharge instructions Progressing        METABOLIC, FLUID AND ELECTROLYTES - ADULT     Electrolytes maintained within normal limits Progressing     Fluid balance maintained Progressing        Nutrition/Hydration-ADULT     Nutrient/Hydration intake appropriate for improving, restoring or maintaining nutritional needs Progressing        PAIN - ADULT     Verbalizes/displays adequate comfort level or baseline comfort level Progressing        Potential for Falls     Patient will remain free of falls Progressing        Prexisting or High Potential for Compromised Skin Integrity     Skin integrity is maintained or improved Progressing        RESPIRATORY - ADULT     Achieves optimal ventilation and oxygenation Progressing        SAFETY ADULT     Patient will remain free of falls Progressing     Maintain or return to baseline ADL function Progressing     Maintain or return mobility status to optimal level Progressing

## 2018-05-10 NOTE — SOCIAL WORK
Cm contacted patient son Anastasia Thorpe regarding care coordination  Anastasia Thorpe also identifying as patient POA stated he is aware that his father was hospitalized and informed cm that patient is a resident of [facility]  Cm informed patient recently discharged from [place] [address] several weeks ago however, goal is for patient to return to [facility]  Cm also informed that patient has HHA services from [address]  CM informed [place] administers all patient medication and assist with ADL's  Tyrell Son  Stated his father is using a wheel chair while at the [facility], and has been using a wheelchair since he discharged from [place] [address]  Cm received a follow up phone call from Roxi with [facility] informing cm that patient has only physical therapy services  Cm team will continue to follow and assess for needs  CM reviewed discharge planning process including the following: identifying help at home, patient preference for discharge planning needs, pharmacy preference, and availability of treatment team to discuss questions or concerns patient and/or family may have regarding understanding medications and recognizing signs and symptoms once discharged  CM also encouraged patient to follow up with all recommended appointments after discharge  Patient advised of importance for patient and family to participate in managing patients medical well being

## 2018-05-11 PROCEDURE — 99233 SBSQ HOSP IP/OBS HIGH 50: CPT | Performed by: INTERNAL MEDICINE

## 2018-05-11 PROCEDURE — G8978 MOBILITY CURRENT STATUS: HCPCS

## 2018-05-11 PROCEDURE — G8979 MOBILITY GOAL STATUS: HCPCS

## 2018-05-11 PROCEDURE — 87070 CULTURE OTHR SPECIMN AEROBIC: CPT | Performed by: INTERNAL MEDICINE

## 2018-05-11 PROCEDURE — 87205 SMEAR GRAM STAIN: CPT | Performed by: INTERNAL MEDICINE

## 2018-05-11 PROCEDURE — 97163 PT EVAL HIGH COMPLEX 45 MIN: CPT

## 2018-05-11 RX ADMIN — DORZOLAMIDE HYDROCHLORIDE 1 DROP: 20 SOLUTION/ DROPS OPHTHALMIC at 09:12

## 2018-05-11 RX ADMIN — ASPIRIN 81 MG 81 MG: 81 TABLET ORAL at 09:08

## 2018-05-11 RX ADMIN — VANCOMYCIN HYDROCHLORIDE 1750 MG: 1 INJECTION, POWDER, LYOPHILIZED, FOR SOLUTION INTRAVENOUS at 12:38

## 2018-05-11 RX ADMIN — FERROUS SULFATE TAB 325 MG (65 MG ELEMENTAL FE) 325 MG: 325 (65 FE) TAB at 18:00

## 2018-05-11 RX ADMIN — DORZOLAMIDE HYDROCHLORIDE 1 DROP: 20 SOLUTION/ DROPS OPHTHALMIC at 18:01

## 2018-05-11 RX ADMIN — CEFEPIME HYDROCHLORIDE 1000 MG: 1 INJECTION, SOLUTION INTRAVENOUS at 20:40

## 2018-05-11 RX ADMIN — FERROUS SULFATE TAB 325 MG (65 MG ELEMENTAL FE) 325 MG: 325 (65 FE) TAB at 09:22

## 2018-05-11 RX ADMIN — GUAIFENESIN 600 MG: 600 TABLET, EXTENDED RELEASE ORAL at 20:38

## 2018-05-11 RX ADMIN — DONEPEZIL HYDROCHLORIDE 10 MG: 5 TABLET ORAL at 21:02

## 2018-05-11 RX ADMIN — GUAIFENESIN 600 MG: 600 TABLET, EXTENDED RELEASE ORAL at 09:09

## 2018-05-11 RX ADMIN — HEPARIN SODIUM 5000 UNITS: 5000 INJECTION, SOLUTION INTRAVENOUS; SUBCUTANEOUS at 13:55

## 2018-05-11 RX ADMIN — BIMATOPROST 1 DROP: 0.1 SOLUTION/ DROPS OPHTHALMIC at 21:02

## 2018-05-11 RX ADMIN — CEFEPIME HYDROCHLORIDE 1000 MG: 1 INJECTION, SOLUTION INTRAVENOUS at 09:12

## 2018-05-11 RX ADMIN — VITAMIN D, TAB 1000IU (100/BT) 2000 UNITS: 25 TAB at 09:08

## 2018-05-11 RX ADMIN — MEMANTINE 10 MG: 10 TABLET ORAL at 09:08

## 2018-05-11 RX ADMIN — CYANOCOBALAMIN TAB 500 MCG 250 MCG: 500 TAB at 09:08

## 2018-05-11 RX ADMIN — HEPARIN SODIUM 5000 UNITS: 5000 INJECTION, SOLUTION INTRAVENOUS; SUBCUTANEOUS at 21:02

## 2018-05-11 RX ADMIN — HEPARIN SODIUM 5000 UNITS: 5000 INJECTION, SOLUTION INTRAVENOUS; SUBCUTANEOUS at 06:26

## 2018-05-11 NOTE — PLAN OF CARE
Discharge to home or other facility with appropriate resources Progressing      Absence or prevention of progression during hospitalization Progressing      Patient/family/caregiver demonstrates understanding of disease process, treatment plan, medications, and discharge instructions Progressing      Patient will remain free of falls Progressing      Skin integrity is maintained or improved Progressing      Achieves optimal ventilation and oxygenation Progressing      Maintain or return to baseline ADL function Progressing

## 2018-05-11 NOTE — PLAN OF CARE
Problem: DISCHARGE PLANNING - CARE MANAGEMENT  Goal: Discharge to post-acute care or home with appropriate resources  INTERVENTIONS:  - Conduct assessment to determine patient/family and health care team treatment goals, and need for post-acute services based on payer coverage, community resources, and patient preferences, and barriers to discharge  - Address psychosocial, clinical, and financial barriers to discharge as identified in assessment in conjunction with the patient/family and health care team  - Arrange appropriate level of post-acute services according to patients   needs and preference and payer coverage in collaboration with the physician and health care team  - Communicate with and update the patient/family, physician, and health care team regarding progress on the discharge plan  - Arrange appropriate transportation to post-acute venues  Outcome: Progressing  Goal:  For patient to return to [facility]  Recently discharged from [facility] several weeks ago

## 2018-05-11 NOTE — PHYSICAL THERAPY NOTE
Physical Therapy Evaluation     Patient's Name: Jg Sánchez    Admitting Diagnosis  UTI (urinary tract infection) [N39 0]  Pneumonia [J18 9]  Hypoxia [R09 02]  Fever [R50 9]  Sepsis ([facility] [address] ) [A41 9]    Problem List  Patient Active Problem List   Diagnosis    Healthcare-associated pneumonia    Hypoxia    Generalized weakness    Acute pulmonary insufficiency    Hypokalemia    Anemia    Leukopenia    Dysphagia    Hyperlipidemia    Dementia    Acute sinusitis    Hypophosphatemia    Vitamin D deficiency    Cerebral atrophy    Cerebellar atrophy    Pneumonia    Dementia    Glaucoma    Personal history of CLL (chronic lymphocytic leukemia)    Thrombocytopenia ([facility] [address] )    Sepsis ([facility] [address] )       Past Medical History  Past Medical History:   Diagnosis Date    Cancer ([facility] )     leukemia    Dementia     Dermatitis     Edema     Glaucoma     Hyperlipidemia     Neuropathy     Vitamin B 12 deficiency        Past Surgical History  History reviewed  No pertinent surgical history  05/11/18 1210   Note Type   Note type Eval/Treat   Pain Assessment   Pain Assessment No/denies pain   Pain Score No Pain   Home Living   Type of Home Assisted living  [facility])   [address]; Wheelchair-manual  (per chart, pt uses manual w/c)   Additional Comments patient is currently in their supported living wing on the main floor of the building, was able to self-propel and transition from a his wheelchair to a standing position with minimal assistance   Prior Function   Level of [place] Needs assistance with ADLs and functional mobility; Needs assistance with IADLs;Modified independent with wheelchair   Lives With Facility staff   Receives Help From Home health  (pt is current w/ HHPT)   ADL Assistance Needs assistance   IADLs Needs assistance   Falls in the last 6 months (unknown- pt unable to report)   Restrictions/Precautions   Weight Bearing Precautions Per Order No   Other Precautions Cognitive; Chair Alarm; Bed Alarm;Multiple lines; Fall Risk;Hard of hearing   General   Family/Caregiver Present No   Cognition   Overall Cognitive Status Impaired   Arousal/Participation Alert   Orientation Level Oriented to person;Disoriented to place; Disoriented to time;Disoriented to situation   Memory Decreased recall of biographical information;Decreased short term memory;Decreased recall of recent events;Decreased recall of precautions   Following Commands Follows one step commands without difficulty   Comments pt is [place]   RUE Assessment   RUE Assessment WFL  (AROM)   LUE Assessment   LUE Assessment WFL  (AROM)   RLE Assessment   RLE Assessment (3/5)   LLE Assessment   LLE Assessment (3/5)   Coordination   Movements are Fluid and Coordinated 1   Sensation (PT unable to formally assess 2/2 decreased cognitive status)   Bed Mobility   Rolling R 2  Maximal assistance   Additional items Assist x 1;Bedrails; Increased time required;Verbal cues;LE management   Rolling L 2  Maximal assistance   Additional items Assist x 1;Bedrails; Increased time required;Verbal cues;LE management   Supine to Sit 2  Maximal assistance   Additional items Assist x 2;HOB elevated; Bedrails; Increased time required;Verbal cues;LE management   Transfers   Sit to Stand 2  Maximal assistance   Additional items Assist x 2;Verbal cues  (pt unable to achieve full standing posture; crouched)   Stand to Sit 2  Maximal assistance   Additional items Assist x 2;Verbal cues;Armrests  (poor descent control)   Sit pivot 2  Maximal assistance   Additional items Assist x 2  (max Ax2/TD)   Ambulation/Elevation   Gait pattern Not appropriate   Balance   Static Sitting Fair   Dynamic Sitting Poor +   Static Standing Poor   Dynamic Standing Poor -   Endurance Deficit   Endurance Deficit Yes   Activity Tolerance   Activity Tolerance Patient limited by fatigue   Medical Staff Made Aware pt w/ up w/ A order   Nurse Made Aware Yes, RN Pancho Castro verbalized pt appropraite for PT session   Assessment   Prognosis Fair   Problem List Decreased strength;Decreased endurance; Impaired balance;Decreased mobility; Decreased cognition;Decreased safety awareness;Decreased skin integrity; Impaired hearing   Assessment Pt is 80 y o  male seen for PT evaluation on 5/11/2018 s/p admit to [facility] on 5/9/2018 w/ Healthcare-associated pneumonia; presented w/ fever, cough, SOB  PT consulted to assess pt's functional mobility and d/c needs  Order placed for PT eval and tx, w/ up w/ A order  Performed at least 2 patient identifiers during session: Name and wristband  Comorbidities affecting pt's physical performance at time of assessment include: dementia, CKD stage 3, sepsis, anemia, HLD, dysphagia  Pt is poor historian, PLOF obtained from pt's chart  PTA, pt was residing at [place] [address], was requiring Ax1 for transferring to/from w/c, primarily uses manual w/c  Personal factors affecting pt at time of IE include: ambulating w/ assistive device, inability to navigate level surfaces w/o external assistance, decreased cognition, hearing impairments, decreased initiation and engagement, unable to perform physical activity, limited insight into impairments, inability to perform IADLs and inability to perform ADLs  Please find objective findings from PT assessment regarding body systems outlined above with impairments and limitations including weakness, impaired balance, decreased endurance, decreased activity tolerance, decreased functional mobility tolerance, decreased safety awareness, fall risk and decreased cognition, as well as mobility assessment (need for cueing for mobility technique)  The following objective measures performed on IE also reveal limitations: Barthel Index: 15/100 and Modified [place]: 4 (moderate/severe disability)  Pt's clinical presentation is currently unstable/unpredictable seen in pt's presentation of baseline dementia   Pt to benefit from continued PT tx to address deficits as defined above and maximize level of functional independent mobility and consistency  From PT/mobility standpoint, recommendation at time of d/c would be STR pending progress in order to facilitate return to PLOF  Barriers to Discharge Other (Comment)  (pt was requiring min A for transfers PTA)   Goals   Patient Goals none expressed   STG Expiration Date 05/21/18   Short Term Goal #1 In 7-10 days: Increase bilateral LE strength 1/2 grade to facilitate independent mobility, Perform all bed mobility tasks with distant S to decrease caregiver burden, Perform all transfers with min A of 1 to improve independence, Increase all balance 1/2 grade to decrease risk for falls, Tolerate 4 hr OOB to faciliate upright tolerance, Complete % of the time and Tolerate seated at EOB 20 minutes to facilitate functional task performance   Treatment Day 0   Plan   Treatment/Interventions Functional transfer training;LE strengthening/ROM; Therapeutic exercise; Endurance training;Cognitive reorientation;Patient/family training;Equipment eval/education; Bed mobility;Spoke to nursing;Spoke to case management;OT   PT Frequency 2-3x/wk   Recommendation   Recommendation Short-term skilled PT   Equipment Recommended Wheelchair  (TBD at [facility])   PT - OK to Discharge Yes  (when medically cleared, if to STR)   Modified Borden Scale   Modified Borden Scale 4   Barthel Index   Feeding 5   Bathing 0   Grooming Score 0   Dressing Score 0   Bladder Score 0   Bowels Score 0   Toilet Use Score 5   Transfers (Bed/Chair) Score 5   Mobility (Level Surface) Score 0   Stairs Score 0   Barthel Index Score 15         Ashkan Mckeon, PT

## 2018-05-11 NOTE — SOCIAL WORK
Bear contacted patient son Ramirez Stafford  regarding physical therapy recommendation for str  Son stated [facility] by [facility] for [facility]  Patient son stated he is agreeable to Alberto Choudharyf recommendation for [address] [place] [address] and would rather his rather return to [facility] instead of [facility] Cm spoke with attending regarding this matter  Attending placed order for hospice evaluation  Cm contacted [facility] liaison regarding referral, cm informed patient clinical will be reviewed, and she will follow up with bear accordingly  Treatment team aware, referral sent this day

## 2018-05-11 NOTE — PROGRESS NOTES
[facility] Progress Note  Patient: Maci Monroy 80 y o  male   MRN: [id number]  PCP: Giuliana Lemons DO  Unit/Bed#: -01 Encounter: [id number]  Date Of Visit: 05/11/18    Assessment/Plan:    Principal Problem:    Healthcare-associated pneumonia  Active Problems:    Generalized weakness    Acute pulmonary insufficiency    Dementia    Sepsis ([address] )    Present on Admission:   Sepsis ([address] )   Healthcare-associated pneumonia   Generalized weakness   Acute pulmonary insufficiency   Dementia      · Healthcare associated pneumonia/aspiration pneumonia  His diet was modified as recommended by speech evaluation  Continue with other current treatment  · Dementia  Patient at times, has been trying to get out of bed  Risk for falls  Staff requested 1 on 1 sitter  I think he would need higher level of care-extended care facility  Discussed with case management  Also ordered PT/OT  · Sepsis secondary to pneumonia as above  · Generalized weakness/deconditioning  PT/OT  Discussed with case management/staff about sitter and also possible higher level of care  Patient's son has contacted case management  There was conversation that patient to be evaluated by hospice  A consult for hospice was put in  VTE Pharmacologic Prophylaxis:   Pharmacologic: Heparin  Mechanical VTE Prophylaxis in Place: Yes    Patient Centered Rounds: I have performed bedside rounds with nursing staff today  Discussions with Specialists or Other Care Team Provider:  Yes    Education and Discussions with Family / Patient:  Yes    Time Spent for Care: 35+ minutes  More than 50% of total time spent on counseling and coordination of care as described above      Current Length of Stay: 2 day(s)    Current Patient Status: Inpatient   Certification Statement: The patient will continue to require additional inpatient hospital stay due to Pneumonia/placement    Discharge Plan:  Extended care facility    Code Status: Level 3 - DNAR and DNI      Subjective:   Patient was somewhat more confused this morning when I saw him  He was trying to crawl out of the bed  He himself denied any trouble breathing although he was breathing through his mouth and was obviously short of breath  Later on, with assistance from the staff, he was up in the chair and trying to feed himself and more calm  Objective:     Vitals:   Temp (24hrs), Av 8 °F (36 6 °C), Min:97 5 °F (36 4 °C), Max:98 5 °F (36 9 °C)    HR:  [61-64] 61  Resp:  [18-20] 18  BP: (132-158)/(60-67) 158/67  SpO2:  [93 %-97 %] 93 %  Body mass index is 24 67 kg/m²  Input and Output Summary (last 24 hours): Intake/Output Summary (Last 24 hours) at 18 1200  Last data filed at 18 0900   Gross per 24 hour   Intake              480 ml   Output              575 ml   Net              -95 ml           Physical Exam:     Vital signs are reviewed as above  Constitutional:  Patient short of breath  Also somewhat more confused and trying to crawl out of the bed   Eyes:  Has very poor vision   HENT: Oropharynx are dry   Neck: Neck is supple  Did not notice any lymphadenopathy  Cardiac: I did not hear any rubs or gallop  Patient appears to be in sinus rhythm  Respiratory:  Stay still short of breath  Mouth is open so that he could breathe somewhat better  Has coarse crackles on auscultation right more than left    GI: Abdomen is soft  It is grossly nontender  Bowel sounds are adequate  I was not able to appreciate any hepatosplenomegaly  Neurologic:  Patient is awake and alert  Has underlying dementia  Skin: Skin is warm and dry  Psychiatric:  At times, he is more agitated/restless   Musculoskeletal   Moving his arms and legs as he was trying to get out of bed initially    Later on, he basically was feeding himself   Extremities: Patient has no significant cyanosis, clubbing, or lower extremity edema       Additional Data:     Labs:      Results from last 7 days  Lab Units 05/10/18  0531 05/09/18  1526   WBC Thousand/uL 4 91 4 90   HEMOGLOBIN g/dL 11 0* 11 3*   HEMATOCRIT % 33 7* 33 9*   PLATELETS Thousands/uL 113* 131*   NEUTROS PCT %  --  50   LYMPHS PCT %  --  44   MONOS PCT %  --  5   EOS PCT %  --  0       Results from last 7 days  Lab Units 05/10/18  0531 05/09/18  1526   SODIUM mmol/L 144 142   POTASSIUM mmol/L 3 9 3 8   CHLORIDE mmol/L 109* 107   CO2 mmol/L 28 25   BUN mg/dL 18 21   CREATININE mg/dL 0 97 1 24   CALCIUM mg/dL 9 1 9 8   TOTAL PROTEIN g/dL  --  6 0*   BILIRUBIN TOTAL mg/dL  --  0 60   ALK PHOS U/L  --  85   ALT U/L  --  24   AST U/L  --  40   GLUCOSE RANDOM mg/dL 116 174*       Results from last 7 days  Lab Units 05/09/18  1526   INR  1 13       * I Have Reviewed All Lab Data Listed Above  * Additional Pertinent Lab Tests Reviewed: All Labs Within Last 24 Hours Reviewed      Recent Cultures (last 7 days):       Results from last 7 days  Lab Units 05/09/18  1547 05/09/18  1530 05/09/18  1528   BLOOD CULTURE   --   --  No Growth at 24 hrs  No Growth at 24 hrs     INFLUENZA A PCR   --  None Detected  --    INFLUENZA B PCR   --  None Detected  --    RSV PCR   --  None Detected  --    LEGIONELLA URINARY ANTIGEN  Negative  --   --        Last 24 Hours Medication List:     Current Facility-Administered Medications:  acetaminophen 650 mg Oral Q6H PRN Laurie Grams, DO    albuterol 2 5 mg Nebulization Q6H PRN Laurie Grams, DO    aspirin 81 mg Oral Daily Laurie Grams, DO    bimatoprost 1 drop Both Eyes HS Laurie Grams, DO    cefepime 1,000 mg Intravenous Q12H Laurie Grams, DO Last Rate: 1,000 mg (05/11/18 0912)   cholecalciferol 2,000 Units Oral Daily Laurie Grams, DO    cyanocobalamin 250 mcg Oral Daily Laurie Grams, DO    dextromethorphan-guaiFENesin 10 mL Oral Q4H PRN Laurie Grams, DO    donepezil 10 mg Oral HS Laurie Grams, DO    dorzolamide 1 drop Both Eyes BID Laurie Grams, DO    ferrous sulfate 325 mg Oral BID With Meals Ethel Del Castillo DO    guaiFENesin 600 mg Oral Q12H [address] Ethel Renteriar,     heparin (porcine) 5,000 Units Subcutaneous [facility] Ethel Del Castillo,     memantine 10 mg Oral Daily Ethel Del Castillo DO    ondansetron 4 mg Intravenous Q6H PRN Ethel Del Castillo DO    vancomycin 20 mg/kg Intravenous Q24H Ethel Del Castillo DO Last Rate: 1,750 mg (05/10/18 1119)        Today, Patient Was Seen By: Perlita Lawosn MD    ** Please Note: Dragon 360 Dictation voice to text software may have been used in the creation of this document   **

## 2018-05-11 NOTE — PLAN OF CARE
Problem: PHYSICAL THERAPY ADULT  Goal: Performs mobility at highest level of function for planned discharge setting  See evaluation for individualized goals  Treatment/Interventions: Functional transfer training, LE strengthening/ROM, Therapeutic exercise, Endurance training, Cognitive reorientation, Patient/family training, Equipment eval/education, Bed mobility, Spoke to nursing, Spoke to case management, OT  Equipment Recommended: Wheelchair (TBD at [employer])       See flowsheet documentation for full assessment, interventions and recommendations  Prognosis: Fair  Problem List: Decreased strength, Decreased endurance, Impaired balance, Decreased mobility, Decreased cognition, Decreased safety awareness, Decreased skin integrity, Impaired hearing  Assessment: Pt is 80 y o  male seen for PT evaluation on 5/11/2018 s/p admit to [facility] on 5/9/2018 w/ Healthcare-associated pneumonia; presented w/ fever, cough, SOB  PT consulted to assess pt's functional mobility and d/c needs  Order placed for PT eval and tx, w/ up w/ A order  Performed at least 2 patient identifiers during session: Name and wristband  Comorbidities affecting pt's physical performance at time of assessment include: dementia, CKD stage 3, sepsis, anemia, HLD, dysphagia  Pt is poor historian, PLOF obtained from pt's chart  PTA, pt was residing at [place] [id number], was requiring Ax1 for transferring to/from w/c, primarily uses manual w/c  Personal factors affecting pt at time of IE include: ambulating w/ assistive device, inability to navigate level surfaces w/o external assistance, decreased cognition, hearing impairments, decreased initiation and engagement, unable to perform physical activity, limited insight into impairments, inability to perform IADLs and inability to perform ADLs   Please find objective findings from PT assessment regarding body systems outlined above with impairments and limitations including weakness, impaired balance, decreased endurance, decreased activity tolerance, decreased functional mobility tolerance, decreased safety awareness, fall risk and decreased cognition, as well as mobility assessment (need for cueing for mobility technique)  The following objective measures performed on IE also reveal limitations: Barthel Index: 15/100 and Modified [place]: 4 (moderate/severe disability)  Pt's clinical presentation is currently unstable/unpredictable seen in pt's presentation of baseline dementia  Pt to benefit from continued PT tx to address deficits as defined above and maximize level of functional independent mobility and consistency  From PT/mobility standpoint, recommendation at time of d/c would be STR pending progress in order to facilitate return to PLOF  Barriers to Discharge: Other (Comment) (pt was requiring min A for transfers PTA)     Recommendation: Short-term skilled PT     PT - OK to Discharge: Yes (when medically cleared, if to STR)    See flowsheet documentation for full assessment

## 2018-05-11 NOTE — SOCIAL WORK
Cm contacted [facility] and spoke with Maegan Santiago (director of wellness)  Maegan Santiago stated patient is currently in their supported living wing on the main floor of the building, was able to self-propel and transition from a his wheelchair to a standing position with minimal assistance  Maegan Santiago mentioned that lately patient appeared to be deconditioning  Maegan Santiago stated she will make arrangements to complete a bedside assessment on Monday  Cm requested a PT consult and informed pt of the consult  Patient is currently on IV antibiotics for pneumonia, uti

## 2018-05-11 NOTE — PLAN OF CARE
Problem: DISCHARGE PLANNING - CARE MANAGEMENT  Goal: Discharge to post-acute care or home with appropriate resources  INTERVENTIONS:  - Conduct assessment to determine patient/family and health care team treatment goals, and need for post-acute services based on payer coverage, community resources, and patient preferences, and barriers to discharge  - Address psychosocial, clinical, and financial barriers to discharge as identified in assessment in conjunction with the patient/family and health care team  - Arrange appropriate level of post-acute services according to patient's   needs and preference and payer coverage in collaboration with the physician and health care team  - Communicate with and update the patient/family, physician, and health care team regarding progress on the discharge plan  - Arrange appropriate transportation to post-acute venues   Outcome: Progressing  [facility] vs  STR pending pt consult and, bedside assessment

## 2018-05-12 LAB — VANCOMYCIN TROUGH SERPL-MCNC: 12.5 UG/ML (ref 10–20)

## 2018-05-12 PROCEDURE — 80202 ASSAY OF VANCOMYCIN: CPT | Performed by: INTERNAL MEDICINE

## 2018-05-12 PROCEDURE — 99232 SBSQ HOSP IP/OBS MODERATE 35: CPT | Performed by: INTERNAL MEDICINE

## 2018-05-12 RX ORDER — ALBUTEROL SULFATE 2.5 MG/3ML
2.5 SOLUTION RESPIRATORY (INHALATION) EVERY 4 HOURS PRN
Status: DISCONTINUED | OUTPATIENT
Start: 2018-05-12 | End: 2018-05-14 | Stop reason: HOSPADM

## 2018-05-12 RX ORDER — AMOXICILLIN AND CLAVULANATE POTASSIUM 500; 125 MG/1; MG/1
1 TABLET, FILM COATED ORAL EVERY 8 HOURS SCHEDULED
Status: DISCONTINUED | OUTPATIENT
Start: 2018-05-12 | End: 2018-05-14 | Stop reason: HOSPADM

## 2018-05-12 RX ADMIN — FERROUS SULFATE TAB 325 MG (65 MG ELEMENTAL FE) 325 MG: 325 (65 FE) TAB at 09:25

## 2018-05-12 RX ADMIN — ASPIRIN 81 MG 81 MG: 81 TABLET ORAL at 09:24

## 2018-05-12 RX ADMIN — DONEPEZIL HYDROCHLORIDE 10 MG: 5 TABLET ORAL at 21:34

## 2018-05-12 RX ADMIN — BIMATOPROST 1 DROP: 0.1 SOLUTION/ DROPS OPHTHALMIC at 21:34

## 2018-05-12 RX ADMIN — VANCOMYCIN HYDROCHLORIDE 1750 MG: 1 INJECTION, POWDER, LYOPHILIZED, FOR SOLUTION INTRAVENOUS at 11:20

## 2018-05-12 RX ADMIN — HEPARIN SODIUM 5000 UNITS: 5000 INJECTION, SOLUTION INTRAVENOUS; SUBCUTANEOUS at 13:16

## 2018-05-12 RX ADMIN — FERROUS SULFATE TAB 325 MG (65 MG ELEMENTAL FE) 325 MG: 325 (65 FE) TAB at 17:25

## 2018-05-12 RX ADMIN — CYANOCOBALAMIN TAB 500 MCG 250 MCG: 500 TAB at 09:24

## 2018-05-12 RX ADMIN — MEMANTINE 10 MG: 10 TABLET ORAL at 09:25

## 2018-05-12 RX ADMIN — CEFEPIME HYDROCHLORIDE 1000 MG: 1 INJECTION, SOLUTION INTRAVENOUS at 09:26

## 2018-05-12 RX ADMIN — DORZOLAMIDE HYDROCHLORIDE 1 DROP: 20 SOLUTION/ DROPS OPHTHALMIC at 09:26

## 2018-05-12 RX ADMIN — DORZOLAMIDE HYDROCHLORIDE 1 DROP: 20 SOLUTION/ DROPS OPHTHALMIC at 17:26

## 2018-05-12 RX ADMIN — HEPARIN SODIUM 5000 UNITS: 5000 INJECTION, SOLUTION INTRAVENOUS; SUBCUTANEOUS at 21:34

## 2018-05-12 RX ADMIN — AMOXICILLIN AND CLAVULANATE POTASSIUM 1 TABLET: 500; 125 TABLET, FILM COATED ORAL at 13:16

## 2018-05-12 RX ADMIN — GUAIFENESIN 600 MG: 600 TABLET, EXTENDED RELEASE ORAL at 09:25

## 2018-05-12 RX ADMIN — AMOXICILLIN AND CLAVULANATE POTASSIUM 1 TABLET: 500; 125 TABLET, FILM COATED ORAL at 21:34

## 2018-05-12 RX ADMIN — HEPARIN SODIUM 5000 UNITS: 5000 INJECTION, SOLUTION INTRAVENOUS; SUBCUTANEOUS at 09:25

## 2018-05-12 RX ADMIN — VITAMIN D, TAB 1000IU (100/BT) 2000 UNITS: 25 TAB at 09:24

## 2018-05-12 NOTE — PROGRESS NOTES
[facility] Internal Medicine Progress Note  Patient: Maryann Dotson 80 y o  male   MRN: [id number]  PCP: Augusto Honeycutt DO  Unit/Bed#: -01 Encounter: [id number]  Date Of Visit: 05/12/18    Assessment/Plan:    Principal Problem:    Healthcare-associated pneumonia  Active Problems:    Generalized weakness    Acute pulmonary insufficiency    Dementia    Sepsis ([address] )    Present on Admission:   Sepsis ([address] )   Healthcare-associated pneumonia   Generalized weakness   Acute pulmonary insufficiency   Dementia      · Healthcare associated pneumonia/aspiration pneumonia leading to acute pulmonary insufficiency  Deescalate antibiotics  Continue current diet with aspiration precautions  · Generalized weakness  PT/OT  · Dementia  No further recommendation  · Sepsis-resolved  · DNR/DNI-hospice to meet with family and evaluate the patient  Would try to complete POLST when patient's son is here  · Thrombocytopenia  Continue to monitor as needed  He has history of having pretty significant thrombocytopenia in the past as well  VTE Pharmacologic Prophylaxis:   Pharmacologic: Heparin  Mechanical VTE Prophylaxis in Place: Yes    Patient Centered Rounds: I have performed bedside rounds with nursing staff today  Discussions with Specialists or Other Care Team Provider:  Yes    Education and Discussions with Family / Patient:  Yes        Current Length of Stay: 3 day(s)    Current Patient Status: Inpatient   Certification Statement: The patient will continue to require additional inpatient hospital stay due to Respiratory issues and possible placement/hospice to see patient as well    Discharge Plan:  Depending upon evaluation by hospice care team    Code Status: Level 3 - DNAR and DNI      Subjective:   Patient appears much more comfortable now and he feels fine  He ate today  He denies any chest pain  Off and on, he has been somewhat confused because of possibly sundowning and has underlying dementia    He denies any pain or trouble breathing to me at this moment    Objective:     Vitals:   Temp (24hrs), Av 8 °F (36 6 °C), Min:97 7 °F (36 5 °C), Max:97 8 °F (36 6 °C)    HR:  [65-77] 77  Resp:  [18-20] 20  BP: (131-147)/(63-70) 140/70  SpO2:  [90 %-93 %] 92 %  Body mass index is 24 67 kg/m²  Input and Output Summary (last 24 hours): Intake/Output Summary (Last 24 hours) at 18 1034  Last data filed at 18 0944   Gross per 24 hour   Intake              340 ml   Output              300 ml   Net               40 ml           Physical Exam:     Vital signs reviewed as above  Patient up in the chair  Less short of breath  Has decreased breath sounds at the bases  S1 and S2 audible  Awake and alert  Additional Data:     Labs:      Results from last 7 days  Lab Units 05/10/18  0531 18  1526   WBC Thousand/uL 4 91 4 90   HEMOGLOBIN g/dL 11 0* 11 3*   HEMATOCRIT % 33 7* 33 9*   PLATELETS Thousands/uL 113* 131*   NEUTROS PCT %  --  50   LYMPHS PCT %  --  44   MONOS PCT %  --  5   EOS PCT %  --  0       Results from last 7 days  Lab Units 05/10/18  0531 18  1526   SODIUM mmol/L 144 142   POTASSIUM mmol/L 3 9 3 8   CHLORIDE mmol/L 109* 107   CO2 mmol/L 28 25   BUN mg/dL 18 21   CREATININE mg/dL 0 97 1 24   CALCIUM mg/dL 9 1 9 8   TOTAL PROTEIN g/dL  --  6 0*   BILIRUBIN TOTAL mg/dL  --  0 60   ALK PHOS U/L  --  85   ALT U/L  --  24   AST U/L  --  40   GLUCOSE RANDOM mg/dL 116 174*       Results from last 7 days  Lab Units 18  1526   INR  1 13       * I Have Reviewed All Lab Data Listed Above  * Additional Pertinent Lab Tests Reviewed: All Labs Within Last 24 Hours Reviewed      Recent Cultures (last 7 days):       Results from last 7 days  Lab Units 18  1937 18  1547 18  1530 18  1528   BLOOD CULTURE   --   --   --  No Growth at 48 hrs  No Growth at 48 hrs     SPUTUM CULTURE  Culture too young- will reincubate  --   --   --    INFLUENZA A PCR   --   -- None Detected  --    INFLUENZA B PCR   --   --  None Detected  --    RSV PCR   --   --  None Detected  --    LEGIONELLA URINARY ANTIGEN   --  Negative  --   --        Last 24 Hours Medication List:     Current Facility-Administered Medications:  acetaminophen 650 mg Oral Q6H PRN Radha Buddle, DO   albuterol 2 5 mg Nebulization Q6H PRN Radha Buddle, DO   amoxicillin-clavulanate 1 tablet Oral [facility] Lisseth Baron MD   aspirin 81 mg Oral Daily Radha Buddle, DO   bimatoprost 1 drop Both Eyes HS Radha Buddle, DO   cholecalciferol 2,000 Units Oral Daily Radha Buddle, DO   cyanocobalamin 250 mcg Oral Daily Radha Buddle, DO   dextromethorphan-guaiFENesin 10 mL Oral Q4H PRN Radha Buddle, DO   donepezil 10 mg Oral HS Radha Buddle, DO   dorzolamide 1 drop Both Eyes BID Radha Buddle, DO   ferrous sulfate 325 mg Oral BID With Meals Radha Buddle, DO   guaiFENesin 600 mg Oral Q12H [address] Radha Buddle, DO   heparin (porcine) 5,000 Units Subcutaneous Q8H [address] Radha Buddle, DO   memantine 10 mg Oral Daily Radha Buddle, DO   ondansetron 4 mg Intravenous Q6H PRN Radha Buddle, DO        Today, Patient Was Seen By: Lisseth Baron MD    ** Please Note: Dragon 360 Dictation voice to text software may have been used in the creation of this document   **

## 2018-05-13 LAB
BACTERIA SPT RESP CULT: NORMAL
GRAM STN SPEC: NORMAL

## 2018-05-13 PROCEDURE — 99232 SBSQ HOSP IP/OBS MODERATE 35: CPT | Performed by: INTERNAL MEDICINE

## 2018-05-13 RX ADMIN — GUAIFENESIN 600 MG: 600 TABLET, EXTENDED RELEASE ORAL at 10:21

## 2018-05-13 RX ADMIN — BIMATOPROST 1 DROP: 0.1 SOLUTION/ DROPS OPHTHALMIC at 21:45

## 2018-05-13 RX ADMIN — CYANOCOBALAMIN TAB 500 MCG 250 MCG: 500 TAB at 10:19

## 2018-05-13 RX ADMIN — GUAIFENESIN 600 MG: 600 TABLET, EXTENDED RELEASE ORAL at 21:44

## 2018-05-13 RX ADMIN — HEPARIN SODIUM 5000 UNITS: 5000 INJECTION, SOLUTION INTRAVENOUS; SUBCUTANEOUS at 14:05

## 2018-05-13 RX ADMIN — FERROUS SULFATE TAB 325 MG (65 MG ELEMENTAL FE) 325 MG: 325 (65 FE) TAB at 10:21

## 2018-05-13 RX ADMIN — AMOXICILLIN AND CLAVULANATE POTASSIUM 1 TABLET: 500; 125 TABLET, FILM COATED ORAL at 21:44

## 2018-05-13 RX ADMIN — DORZOLAMIDE HYDROCHLORIDE 1 DROP: 20 SOLUTION/ DROPS OPHTHALMIC at 10:21

## 2018-05-13 RX ADMIN — MEMANTINE 10 MG: 10 TABLET ORAL at 10:21

## 2018-05-13 RX ADMIN — FERROUS SULFATE TAB 325 MG (65 MG ELEMENTAL FE) 325 MG: 325 (65 FE) TAB at 17:18

## 2018-05-13 RX ADMIN — AMOXICILLIN AND CLAVULANATE POTASSIUM 1 TABLET: 500; 125 TABLET, FILM COATED ORAL at 06:45

## 2018-05-13 RX ADMIN — DONEPEZIL HYDROCHLORIDE 10 MG: 5 TABLET ORAL at 21:44

## 2018-05-13 RX ADMIN — AMOXICILLIN AND CLAVULANATE POTASSIUM 1 TABLET: 500; 125 TABLET, FILM COATED ORAL at 14:05

## 2018-05-13 RX ADMIN — HEPARIN SODIUM 5000 UNITS: 5000 INJECTION, SOLUTION INTRAVENOUS; SUBCUTANEOUS at 06:45

## 2018-05-13 RX ADMIN — ASPIRIN 81 MG 81 MG: 81 TABLET ORAL at 10:19

## 2018-05-13 RX ADMIN — HEPARIN SODIUM 5000 UNITS: 5000 INJECTION, SOLUTION INTRAVENOUS; SUBCUTANEOUS at 21:45

## 2018-05-13 RX ADMIN — VITAMIN D, TAB 1000IU (100/BT) 2000 UNITS: 25 TAB at 10:19

## 2018-05-13 RX ADMIN — DORZOLAMIDE HYDROCHLORIDE 1 DROP: 20 SOLUTION/ DROPS OPHTHALMIC at 17:21

## 2018-05-13 NOTE — PROGRESS NOTES
[facility] Internal Medicine Progress Note  Patient: Devan Bowie 80 y o  male   MRN: [id number]  PCP: Ellie Jackson DO  Unit/Bed#: -01 Encounter: [id number]  Date Of Visit: 18    Assessment/Plan:    Principal Problem:    Healthcare-associated pneumonia  Active Problems:    Generalized weakness    Acute pulmonary insufficiency    Dementia    Sepsis ([address] )    Present on Admission:   [address]is [address] )   Healthcare-associated pneumonia   Generalized weakness   Acute pulmonary insufficiency   Dementia      · Acute pulmonary insufficiency/ Sepsis secondary to healthcare associated pneumonia/aspiration pneumonia  Appears to have more aspiration issues today  Continue with current conservative treatment including oral antibiotics  Patient to be evaluated by compassionate hospice tomorrow  · Generalized weakness-multifactorial including illness as above and poor nutritional status  Continue with current treatment  · Dementia  No further recommendations      VTE Pharmacologic Prophylaxis:   Pharmacologic: Heparin  Mechanical VTE Prophylaxis in Place: Yes    Patient Centered Rounds: I have performed bedside rounds with nursing staff today  Discussions with Specialists or Other Care Team Provider:  Yes    Education and Discussions with Family / Patient:  Yes      Current Length of Stay: 4 day(s)    Current Patient Status: Inpatient   Certification Statement: The patient will continue to require additional inpatient hospital stay due to Pneumonia    Discharge Plan:  Possible back to his facility with hospice to follow up    Code Status: Level 3 - DNAR and DNI      Subjective:   Patient somewhat more short of breath and I thing aspirating more  He is hard of hearing and also cannot see well    Himself denies any significant symptoms    Objective:     Vitals:   Temp (24hrs), Av 3 °F (36 8 °C), Min:98 1 °F (36 7 °C), Max:98 5 °F (36 9 °C)    HR:  [63-72] 64  Resp:  [18-24] 18  BP: (160-171)/(71-80) 160/71  SpO2:  [94 %-95 %] 94 %  Body mass index is 24 67 kg/m²  Input and Output Summary (last 24 hours): Intake/Output Summary (Last 24 hours) at 05/13/18 1126  Last data filed at 05/13/18 0940   Gross per 24 hour   Intake              200 ml   Output                0 ml   Net              200 ml           Physical Exam:     Vital signs reviewed as above  Up in the couch  He is trying to read at times although  He is more short of breath  Could hear gurgling sounds in his upper airways  S1 and S2 are audible  Awake and alert although he does not hear well and also does not see well  Abdomen is soft  Bowel sounds are audible  No cyanosis, clubbing, or lower extremities edema  Pleasant otherwise  Skin is warm and dry  Lips are dry    Additional Data:     Labs:      Results from last 7 days  Lab Units 05/10/18  0531 05/09/18  1526   WBC Thousand/uL 4 91 4 90   HEMOGLOBIN g/dL 11 0* 11 3*   HEMATOCRIT % 33 7* 33 9*   PLATELETS Thousands/uL 113* 131*   NEUTROS PCT %  --  50   LYMPHS PCT %  --  44   MONOS PCT %  --  5   EOS PCT %  --  0       Results from last 7 days  Lab Units 05/10/18  0531 05/09/18  1526   SODIUM mmol/L 144 142   POTASSIUM mmol/L 3 9 3 8   CHLORIDE mmol/L 109* 107   CO2 mmol/L 28 25   BUN mg/dL 18 21   CREATININE mg/dL 0 97 1 24   CALCIUM mg/dL 9 1 9 8   TOTAL PROTEIN g/dL  --  6 0*   BILIRUBIN TOTAL mg/dL  --  0 60   ALK PHOS U/L  --  85   ALT U/L  --  24   AST U/L  --  40   GLUCOSE RANDOM mg/dL 116 174*       Results from last 7 days  Lab Units 05/09/18  1526   INR  1 13       * I Have Reviewed All Lab Data Listed Above  * Additional Pertinent Lab Tests Reviewed: No New Labs Available For Today    Delete    Recent Cultures (last 7 days):       Results from last 7 days  Lab Units 05/11/18  1937 05/09/18  1547 05/09/18  1530 05/09/18  1528   BLOOD CULTURE   --   --   --  No Growth at 72 hrs  No Growth at 72 hrs     SPUTUM CULTURE  3+ Growth of   --   --   --    GRAM STAIN RESULT Rare Epithelial Cells  1+ Polys  1+ Gram positive cocci in pairs  Rare Gram positive rods  --   --   --    INFLUENZA A PCR   --   --  None Detected  --    INFLUENZA B PCR   --   --  None Detected  --    RSV PCR   --   --  None Detected  --    LEGIONELLA URINARY ANTIGEN   --  Negative  --   --        Last 24 Hours Medication List:     Current Facility-Administered Medications:  acetaminophen 650 mg Oral Q6H PRN Sherill Ponce, DO   albuterol 2 5 mg Nebulization Q4H PRN Rachel Sanford MD   amoxicillin-clavulanate 1 tablet Oral [facility] Rachel Sanford MD   aspirin 81 mg Oral Daily Sherill Ponce, DO   bimatoprost 1 drop Both Eyes HS Sherill Ponce, DO   cholecalciferol 2,000 Units Oral Daily Sherill Ponce, DO   cyanocobalamin 250 mcg Oral Daily Sherill Ponce, DO   dextromethorphan-guaiFENesin 10 mL Oral Q4H PRN Sherill Ponce, DO   donepezil 10 mg Oral HS Sherill Ponce, DO   dorzolamide 1 drop Both Eyes BID Sherill Ponce, DO   ferrous sulfate 325 mg Oral BID With Meals Sherill Ponce, DO   guaiFENesin 600 mg Oral Q12H [address] Sherill Ponce, DO   heparin (porcine) 5,000 Units Subcutaneous Q8H [address] Sherill Ponce, DO   memantine 10 mg Oral Daily Sherill Ponce, DO   ondansetron 4 mg Intravenous Q6H PRN Sherill Ponce, DO        Today, Patient Was Seen By: Rachel Sanford MD    ** Please Note: Dragon 360 Dictation voice to text software may have been used in the creation of this document   **

## 2018-05-13 NOTE — CASE MANAGEMENT
Continued Stay Review    Date: 5/13    Vital Signs: /90 (BP Location: Right arm)   Pulse 68   Temp 98 °F (36 7 °C) (Oral)   Resp 18   Ht 6' (1 829 m)   Wt 82 5 kg (181 lb 14 1 oz)   SpO2 95%   BMI 24 67 kg/m²     O2 3L N/C    Medications:   Scheduled Meds:   Current Facility-Administered Medications:  amoxicillin-clavulanate 1 tablet Oral Q8H SONY   aspirin 81 mg Oral Daily   bimatoprost 1 drop Both Eyes HS   cholecalciferol 2,000 Units Oral Daily   cyanocobalamin 250 mcg Oral Daily   donepezil 10 mg Oral HS   dorzolamide 1 drop Both Eyes BID   ferrous sulfate 325 mg Oral BID With Meals   guaiFENesin 600 mg Oral Q12H [address]   heparin (porcine) 5,000 Units Subcutaneous Q8H [address]   memantine 10 mg Oral Daily     Continuous Infusions:    PRN Meds:   acetaminophen    albuterol    dextromethorphan-guaiFENesin    ondansetron    Abnormal Labs/Diagnostic Results:   No new    Age/Sex: 80 y o  male     Assessment/Plan:   Principal Problem:    Healthcare-associated pneumonia  Active Problems:    Generalized weakness    Acute pulmonary insufficiency    Dementia    Sepsis ([address] )     Present on Admission:   Sepsis ([address] )   Healthcare-associated pneumonia   Generalized weakness   Acute pulmonary insufficiency   Dementia     Acute pulmonary insufficiency/ Sepsis secondary to healthcare associated pneumonia/aspiration pneumonia  Appears to have more aspiration issues today  Continue with current conservative treatment including oral antibiotics  Patient to be evaluated by compassionate hospice tomorrow  · Generalized weakness-multifactorial including illness as above and poor nutritional status  Continue with current treatment  · Dementia  No further recommendations        VTE Pharmacologic Prophylaxis:   Pharmacologic: Heparin  Mechanical VTE Prophylaxis in Place:  Yes     Current Length of Stay: 4 day(s)     Current Patient Status: Inpatient   Certification Statement: The patient will continue to require additional inpatient hospital stay due to Pneumonia     Discharge Plan:  Possible back to his facility with hospice to follow up

## 2018-05-14 VITALS
DIASTOLIC BLOOD PRESSURE: 70 MMHG | RESPIRATION RATE: 18 BRPM | SYSTOLIC BLOOD PRESSURE: 158 MMHG | HEART RATE: 61 BPM | TEMPERATURE: 97.6 F | HEIGHT: 72 IN | OXYGEN SATURATION: 93 % | BODY MASS INDEX: 24.63 KG/M2 | WEIGHT: 181.88 LBS

## 2018-05-14 PROBLEM — J69.0 ASPIRATION PNEUMONIA (HCC): Status: ACTIVE | Noted: 2017-10-19

## 2018-05-14 LAB
BACTERIA BLD CULT: NORMAL
BACTERIA BLD CULT: NORMAL

## 2018-05-14 PROCEDURE — 97110 THERAPEUTIC EXERCISES: CPT

## 2018-05-14 PROCEDURE — 99239 HOSP IP/OBS DSCHRG MGMT >30: CPT | Performed by: INTERNAL MEDICINE

## 2018-05-14 PROCEDURE — 97530 THERAPEUTIC ACTIVITIES: CPT

## 2018-05-14 RX ORDER — AMOXICILLIN AND CLAVULANATE POTASSIUM 500; 125 MG/1; MG/1
1 TABLET, FILM COATED ORAL EVERY 8 HOURS SCHEDULED
Qty: 15 TABLET | Refills: 0 | Status: SHIPPED | OUTPATIENT
Start: 2018-05-14 | End: 2018-05-19

## 2018-05-14 RX ORDER — LORAZEPAM 1 MG/1
0.5 TABLET ORAL EVERY 6 HOURS PRN
Qty: 3 TABLET | Refills: 0 | Status: SHIPPED | OUTPATIENT
Start: 2018-05-14

## 2018-05-14 RX ADMIN — ASPIRIN 81 MG 81 MG: 81 TABLET ORAL at 09:26

## 2018-05-14 RX ADMIN — MEMANTINE 10 MG: 10 TABLET ORAL at 09:14

## 2018-05-14 RX ADMIN — DORZOLAMIDE HYDROCHLORIDE 1 DROP: 20 SOLUTION/ DROPS OPHTHALMIC at 17:34

## 2018-05-14 RX ADMIN — VITAMIN D, TAB 1000IU (100/BT) 2000 UNITS: 25 TAB at 09:14

## 2018-05-14 RX ADMIN — AMOXICILLIN AND CLAVULANATE POTASSIUM 1 TABLET: 500; 125 TABLET, FILM COATED ORAL at 13:52

## 2018-05-14 RX ADMIN — DORZOLAMIDE HYDROCHLORIDE 1 DROP: 20 SOLUTION/ DROPS OPHTHALMIC at 09:15

## 2018-05-14 RX ADMIN — AMOXICILLIN AND CLAVULANATE POTASSIUM 1 TABLET: 500; 125 TABLET, FILM COATED ORAL at 05:12

## 2018-05-14 RX ADMIN — FERROUS SULFATE TAB 325 MG (65 MG ELEMENTAL FE) 325 MG: 325 (65 FE) TAB at 09:14

## 2018-05-14 RX ADMIN — HEPARIN SODIUM 5000 UNITS: 5000 INJECTION, SOLUTION INTRAVENOUS; SUBCUTANEOUS at 13:52

## 2018-05-14 RX ADMIN — GUAIFENESIN 600 MG: 600 TABLET, EXTENDED RELEASE ORAL at 09:15

## 2018-05-14 RX ADMIN — HEPARIN SODIUM 5000 UNITS: 5000 INJECTION, SOLUTION INTRAVENOUS; SUBCUTANEOUS at 05:12

## 2018-05-14 RX ADMIN — CYANOCOBALAMIN TAB 500 MCG 250 MCG: 500 TAB at 09:15

## 2018-05-14 RX ADMIN — FERROUS SULFATE TAB 325 MG (65 MG ELEMENTAL FE) 325 MG: 325 (65 FE) TAB at 16:57

## 2018-05-14 NOTE — DISCHARGE SUMMARY
Discharge Summary - [facility] Internal Medicine    Patient Information: Tad Resendiz 80 y o  male MRN: [id number]  Unit/Bed#: -01 Encounter: [id number]    Discharging Physician / Practitioner: Jadon Gould MD  PCP: Janak Ro DO  Admission Date: 5/9/2018  Discharge Date: 05/14/18    Reason for Admission:  Healthcare associated pneumonia    Discharge Diagnoses:     Principal Problem:    Aspiration pneumonia [facility])  Active Problems:    Healthcare-associated pneumonia    Generalized weakness    Acute pulmonary insufficiency    Dementia    Sepsis [address] [address] )  Resolved Problems:    * No resolved hospital problems  *    Present on Admission:   Sepsis ([facility] [address] [facility] )   Healthcare-associated pneumonia   Generalized weakness   Acute pulmonary insufficiency   Dementia   Aspiration pneumonia ([facility] )    Consultations During Hospital Stay:  · None    Procedures Performed:     · Chest x-ray showing pneumonia    Significant Findings:     · As above    Incidental Findings:   · As a     Test Results Pending at Discharge (will require follow up): · None     Outpatient Tests Requested:  · None    Complications:  Patient has been going downhill because of his respiratory issues/aspiration    Hospital Course:     Tad Resendiz is a 80 y o  male patient who originally presented to the hospital on 5/9/2018 due to worsening shortness of breath  He was found to have pneumonia  Treated with antibiotics intravenously initially as he is a resident of assisted living facility  He is 80years old and has issues with aspiration in the past   Continues to have these issues this time as well  He is not making a lot of progress in the right direction  Patient is DNR/DN I  Case management/ have approached patient's POA/son [place]  Patient also has been on hospice previously    Now plan is that patient to go back to his assisted living facility and hospice to follow up and manage patient symptomatic Chichi Nicolas in the facility  I did give patient few more days of oral antibiotics if he could take  Otherwise his prognosis appears to be poor at this moment  Note:  Patient is going to be followed-up by compassionate hospice  Patient has never asked anything for pain  Therefore, I have not prescribed him any narcotics  This can be prescribed by the hospice provider  I have prescribed few doses of Ativan to be given p r n  on their request    Condition at Discharge: poor     Discharge Day Visit / Exam:     Subjective:  Patient woke up briefly and then really did not respond much as he is not feeling well and more short of breath/lethargic  Vitals: Blood Pressure: (!) 178/77 (05/14/18 0700)  Pulse: 61 (05/14/18 0700)  Temperature: (!) 97 4 °F (36 3 °C) (05/14/18 0700)  Temp Source: Axillary (05/14/18 0700)  Respirations: 18 (05/14/18 0700)  Height: 6' (182 9 cm) (05/10/18 1408)  Weight - Scale: 82 5 kg (181 lb 14 1 oz) (05/09/18 1739)  SpO2: 94 % (05/14/18 0700)  Exam:        Patient more lethargic  Vital signs reviewed as above  He is also more short of breath and more actively aspirating  Woke up very briefly and then just stayed in bed  S1 and S2 audible  Very poor air entry bilaterally  Did not follow much commands  Abdomen is soft  Bowel sounds are audible            Discharge instructions/Information to patient and family:   See after visit summary for information provided to patient and family  Provisions for Follow-Up Care:  See after visit summary for information related to follow-up care and any pertinent home health orders  Disposition:     Acute Rehab at [address] [place] [address], [facility]  with hospice to follow up and manage    For Discharges to [facility] SNF:   · Not Applicable to this Patient - Not Applicable to this Patient    Planned Readmission:  None     Discharge Statement:  I spent 35+ minutes discharging the patient  This time was spent on the day of discharge   I had direct contact with the patient on the day of discharge  Greater than 50% of the total time was spent examining patient, answering all patient questions, arranging and discussing plan of care with patient as well as directly providing post-discharge instructions  Additional time then spent on discharge activities  Discharge Medications:  See after visit summary for reconciled discharge medications provided to patient and family  ** Please Note: Dragon 360 Dictation voice to text software may have been used in the creation of this document   **

## 2018-05-14 NOTE — SOCIAL WORK
Compassionate Care liaison completed bedside assessment with patient and able to admit patient under services with Compassionate Care  Cm contacted patient son regarding this matter  Sabra Dutch  Reports understanding and desiring this plan of care  Jeremy Suarez aware that patient will discharge home this day at 6pm  Compassionate care liaison coordinating patient equipment and desiring patient to discharge in the PM   Cm contacted [facility] and spoke with Dax Ellison stated she is aware patient will discharge this afternoon, floor nurse provided report during this phone conversation  MD provided rx for Ativan only  Medical necessity and out of hospital DNR placed in patient chart  Nurse to fax prescriptions

## 2018-05-14 NOTE — PLAN OF CARE
Problem: DISCHARGE PLANNING - CARE MANAGEMENT  Goal: Discharge to post-acute care or home with appropriate resources  INTERVENTIONS:  - Conduct assessment to determine patient/family and health care team treatment goals, and need for post-acute services based on payer coverage, community resources, and patient preferences, and barriers to discharge  - Address psychosocial, clinical, and financial barriers to discharge as identified in assessment in conjunction with the patient/family and health care team  - Arrange appropriate level of post-acute services according to patient's   needs and preference and payer coverage in collaboration with the physician and health care team  - Communicate with and update the patient/family, physician, and health care team regarding progress on the discharge plan  - Arrange appropriate transportation to post-acute venues   Outcome: Progressing  Patient to discharge to [facility] with compassionate care services

## 2018-05-14 NOTE — PHYSICAL THERAPY NOTE
PHYSICAL THERAPY NOTE          Patient Name: Reji Salas  CTRBS'L Date: 5/14/2018 05/14/18 1117   Pain Assessment   Pain Assessment No/denies pain   Pain Score No Pain   Restrictions/Precautions   Weight Bearing Precautions Per Order No   Other Precautions Cognitive; Chair Alarm; Bed Alarm; Fall Risk;Hard of hearing   General   Chart Reviewed Yes   Response to Previous Treatment Patient with no complaints from previous session  Family/Caregiver Present No   Cognition   Overall Cognitive Status Impaired   Arousal/Participation Alert; Responsive; Cooperative   Attention Attends with cues to redirect   Orientation Level Oriented to person   Memory Decreased recall of biographical information;Decreased short term memory;Decreased recall of recent events;Decreased recall of precautions   Following Commands Follows one step commands without difficulty   Comments Patient is [facility]    Bed Mobility   Rolling R 2  Maximal assistance   Additional items Assist x 2;HOB elevated; Bedrails; Increased time required;Verbal cues;LE management   Rolling L 2  Maximal assistance   Additional items Assist x 2;HOB elevated; Bedrails; Increased time required;Verbal cues;LE management   Additional Comments Pt requesting bed burciaga while supine  RN Chandra Records made aware of such  Transfers   Sit to Stand Unable to assess   Ambulation/Elevation   Gait pattern Not appropriate; Not tested   Balance   Static Sitting (unable to assess )   Dynamic Sitting (unable to assess )   Static Standing (DNT )   Dynamic Standing (DNT)   Endurance Deficit   Endurance Deficit Yes   Activity Tolerance   Activity Tolerance Patient limited by fatigue   Nurse Made Aware yes, RN Chandra Records verbalized patient appropriate for PT session  Exercises   Quad Sets Supine;20 reps;AROM; Bilateral  (tactile cues required for proper performance of exercise )   Heelslides Supine;20 reps;AAROM; Bilateral   Hip Abduction Supine;20 reps;AAROM; Bilateral   Ankle Pumps Supine;20 reps;AAROM; Bilateral   Assessment   Prognosis Fair   Problem List Decreased strength;Decreased endurance; Impaired balance;Decreased mobility; Decreased cognition;Decreased safety awareness;Decreased skin integrity; Impaired hearing   Assessment Pt seen for PT treatment session this date with interventions consisting of Therapeutic exercise consisting of: AROM and AAROM 2 sets of 10 reps B LE as documented in supine position and therapeutic activity consisting of training: bed mobility training including rolling bilaterally in bed with maximum assistance x 2 for safety   Pt agreeable to PT treatment session upon arrival, pt found supine in bed w/ HOB elevated, in no apparent distress  In comparison to previous session, pt with no improvements as evidenced by patient requires maximum assistance x 2 for bilateral rolling in bed   Post session: pt returned BTB, bed alarm engaged, all needs in reach and RN notified of session findings/recommendations Continue to recommend STR at time of d/c in order to maximize pt's functional independence and safety w/ mobility  Pt continues to be functioning below baseline level, and remains limited 2* factors listed above and including increased risk for falls  PT will continue to see pt while here in order to address the deficits listed above and provide interventions consistent w/ POC in effort to achieve STGs   (pt positioned comfortably in bed w/all needs within reach)   Barriers to Discharge (Pt was requiring min A for transfers PTA )   Goals   Patient Goals none expressed by patient    STG Expiration Date 05/21/18   Short Term Goal #1 STGs remain appropriate    Treatment Day 1   Plan   Treatment/Interventions Functional transfer training;LE strengthening/ROM; Therapeutic exercise; Endurance training;Cognitive reorientation;Patient/family training;Bed mobility;Spoke to nursing;Continued evaluation   Progress Slow progress, decreased activity tolerance   PT Frequency 2-3x/wk   Recommendation   Recommendation Short-term skilled PT   Equipment Recommended Wheelchair  (TBD at [address])   PT - OK to Discharge Yes  (when medically cleared, if to [address] )   Renetta Colvin, PT

## 2018-05-14 NOTE — SOCIAL WORK
Follow up call placed to Compassionate care liaison Denny Avila, who reports she will complete her bedside assessment this morning  Cm team will continue to follow and assess for needs

## 2018-05-14 NOTE — PLAN OF CARE
Problem: Nutrition/Hydration-ADULT  Goal: Nutrient/Hydration intake appropriate for improving, restoring or maintaining nutritional needs  Monitor and assess patient's nutrition/hydration status for malnutrition (ex- brittle hair, bruises, dry skin, pale skin and conjunctiva, muscle wasting, smooth red tongue, and disorientation)  Collaborate with interdisciplinary team and initiate plan and interventions as ordered  Monitor patient's weight and dietary intake as ordered or per policy  Utilize nutrition screening tool and intervene per policy  Determine patient's food preferences and provide high-protein, high-caloric foods as appropriate  INTERVENTIONS:  - Monitor oral intake, urinary output, labs, and treatment plans  - Assess nutrition and hydration status and recommend course of action  - Evaluate amount of meals eaten  - Assist patient with eating if necessary   - Allow adequate time for meals  - Recommend/ encourage appropriate diets, oral nutritional supplements, and vitamin/mineral supplements  - Order, calculate, and assess calorie counts as needed  - Assess need for intravenous fluids  - Provide specific nutrition/hydration education as appropriate  - Include patient/family/caregiver in decisions related to nutrition   Monitor and assess patient's nutrition/hydration status for malnutrition (ex- brittle hair, bruises, dry skin, pale skin and conjunctiva, muscle wasting, smooth red tongue, and disorientation)  Collaborate with interdisciplinary team and initiate plan and interventions as ordered  Monitor patient's weight and dietary intake as ordered or per policy  Utilize nutrition screening tool and intervene per policy  Determine patient's food preferences and provide high-protein, high-caloric foods as appropriate       INTERVENTIONS:  - Monitor oral intake, urinary output, labs, and treatment plans  - Assess nutrition and hydration status and recommend course of action  - Evaluate amount of meals eaten  - Assist patient with eating if necessary   - Allow adequate time for meals  - Recommend/ encourage appropriate diets, oral nutritional supplements, and vitamin/mineral supplements  - Order, calculate, and assess calorie counts as needed  - Recommend, monitor, and adjust tube feedings and TPN/PPN based on assessed needs  - Assess need for intravenous fluids  - Provide specific nutrition/hydration education as appropriate  - Include patient/family/caregiver in decisions related to nutrition   Outcome: Progressing  Noted continued concern for aspiration  Pt w/ fair-to-good meal completion

## 2018-05-14 NOTE — PLAN OF CARE
Problem: PHYSICAL THERAPY ADULT  Goal: Performs mobility at highest level of function for planned discharge setting  See evaluation for individualized goals  Treatment/Interventions: Functional transfer training, LE strengthening/ROM, Therapeutic exercise, Endurance training, Cognitive reorientation, Patient/family training, Equipment eval/education, Bed mobility, Spoke to nursing, Spoke to case management, OT  Equipment Recommended: Wheelchair (TBD at [employer])       See flowsheet documentation for full assessment, interventions and recommendations  Outcome: Not Progressing  Prognosis: Fair  Problem List: Decreased strength, Decreased endurance, Impaired balance, Decreased mobility, Decreased cognition, Decreased safety awareness, Decreased skin integrity, Impaired hearing  Assessment: Pt seen for PT treatment session this date with interventions consisting of Therapeutic exercise consisting of: AROM and AAROM 2 sets of 10 reps B LE as documented in supine position and therapeutic activity consisting of training: bed mobility training including rolling bilaterally in bed with maximum assistance x 2 for safety   Pt agreeable to PT treatment session upon arrival, pt found supine in bed w/ HOB elevated, in no apparent distress  In comparison to previous session, pt with no improvements as evidenced by patient requires maximum assistance x 2 for bilateral rolling in bed   Post session: pt returned BTB, bed alarm engaged, all needs in reach and RN notified of session findings/recommendations Continue to recommend STR at time of d/c in order to maximize pt's functional independence and safety w/ mobility  Pt continues to be functioning below baseline level, and remains limited 2* factors listed above and including increased risk for falls   PT will continue to see pt while here in order to address the deficits listed above and provide interventions consistent w/ POC in effort to achieve STGs  (pt positioned comfortably in bed w/all needs within reach)  Barriers to Discharge:  (Pt was requiring min A for transfers PTA )     Recommendation: Short-term skilled PT     PT - OK to Discharge: Yes (when medically cleared, if to STR )    See flowsheet documentation for full assessment

## 2018-05-14 NOTE — PLAN OF CARE
DISCHARGE PLANNING     Discharge to home or other facility with appropriate resources Progressing        DISCHARGE PLANNING - CARE MANAGEMENT     Discharge to post-acute care or home with appropriate resources Progressing        HEMATOLOGIC - ADULT     Maintains hematologic stability Progressing        INFECTION - ADULT     Absence or prevention of progression during hospitalization Progressing     Absence of fever/infection during neutropenic period Progressing        Knowledge Deficit     Patient/family/caregiver demonstrates understanding of disease process, treatment plan, medications, and discharge instructions Progressing        METABOLIC, FLUID AND ELECTROLYTES - ADULT     Electrolytes maintained within normal limits Progressing     Fluid balance maintained Progressing        Nutrition/Hydration-ADULT     Nutrient/Hydration intake appropriate for improving, restoring or maintaining nutritional needs Progressing        PAIN - ADULT     Verbalizes/displays adequate comfort level or baseline comfort level Progressing        Potential for Falls     Patient will remain free of falls Progressing        Prexisting or High Potential for Compromised Skin Integrity     Skin integrity is maintained or improved Progressing        RESPIRATORY - ADULT     Achieves optimal ventilation and oxygenation Progressing        SAFETY ADULT     Patient will remain free of falls Progressing     Maintain or return to baseline ADL function Progressing     Maintain or return mobility status to optimal level Progressing        SAFETY,RESTRAINT: NV/NON-SELF DESTRUCTIVE BEHAVIOR     Remains free of harm/injury (restraint for non violent/non self-detsructive behavior) Progressing     Returns to optimal restraint-free functioning Progressing        SKIN/TISSUE INTEGRITY - ADULT     Skin integrity remains intact Progressing

## 2018-05-15 ENCOUNTER — TRANSITIONAL CARE MANAGEMENT (OUTPATIENT)
Dept: INTERNAL MEDICINE CLINIC | Facility: CLINIC | Age: 83
End: 2018-05-15

## 2018-05-16 ENCOUNTER — TRANSITIONAL CARE MANAGEMENT (OUTPATIENT)
Dept: INTERNAL MEDICINE CLINIC | Facility: CLINIC | Age: 83
End: 2018-05-16

## 2018-05-18 ENCOUNTER — TELEPHONE (OUTPATIENT)
Dept: INTERNAL MEDICINE CLINIC | Facility: CLINIC | Age: 83
End: 2018-05-18

## 2018-05-18 NOTE — TELEPHONE ENCOUNTER
CROW MCNAIR  CALLED   WANTED  PETER  TO  KNOW  THAT  MICHAEL  IS  UNDER  HOSPICE  CARE  NOW  HE  HAD  AN  APPT  FOR  Tuesday  WITH  [facility]ER BUT  THEY  CANCELLED  IT

## 2018-07-03 ENCOUNTER — TELEPHONE (OUTPATIENT)
Dept: INTERNAL MEDICINE CLINIC | Facility: CLINIC | Age: 83
End: 2018-07-03